# Patient Record
Sex: FEMALE | Race: WHITE | NOT HISPANIC OR LATINO | URBAN - METROPOLITAN AREA
[De-identification: names, ages, dates, MRNs, and addresses within clinical notes are randomized per-mention and may not be internally consistent; named-entity substitution may affect disease eponyms.]

---

## 2019-02-07 ENCOUNTER — EMERGENCY (EMERGENCY)
Facility: HOSPITAL | Age: 49
LOS: 1 days | End: 2019-02-07
Admitting: EMERGENCY MEDICINE
Payer: MEDICARE

## 2019-02-07 PROCEDURE — 69200 CLEAR OUTER EAR CANAL: CPT

## 2019-02-07 PROCEDURE — 99283 EMERGENCY DEPT VISIT LOW MDM: CPT | Mod: 25

## 2019-05-29 ENCOUNTER — OUTPATIENT (OUTPATIENT)
Dept: OUTPATIENT SERVICES | Facility: HOSPITAL | Age: 49
LOS: 1 days | End: 2019-05-29

## 2019-10-15 ENCOUNTER — OUTPATIENT (OUTPATIENT)
Dept: OUTPATIENT SERVICES | Facility: HOSPITAL | Age: 49
LOS: 1 days | Discharge: HOME | End: 2019-10-15
Payer: MEDICARE

## 2019-10-21 PROCEDURE — 90837 PSYTX W PT 60 MINUTES: CPT

## 2019-11-04 PROCEDURE — 99214 OFFICE O/P EST MOD 30 MIN: CPT

## 2019-11-14 DIAGNOSIS — F42.2 MIXED OBSESSIONAL THOUGHTS AND ACTS: ICD-10-CM

## 2019-11-25 PROCEDURE — 99214 OFFICE O/P EST MOD 30 MIN: CPT

## 2019-11-26 ENCOUNTER — OUTPATIENT (OUTPATIENT)
Dept: OUTPATIENT SERVICES | Facility: HOSPITAL | Age: 49
LOS: 1 days | End: 2019-11-26

## 2019-11-27 ENCOUNTER — EMERGENCY (EMERGENCY)
Facility: HOSPITAL | Age: 49
LOS: 1 days | End: 2019-11-27
Admitting: EMERGENCY MEDICINE
Payer: MEDICARE

## 2019-11-27 PROCEDURE — 99284 EMERGENCY DEPT VISIT MOD MDM: CPT

## 2019-11-27 PROCEDURE — 74176 CT ABD & PELVIS W/O CONTRAST: CPT | Mod: 26

## 2019-11-30 ENCOUNTER — EMERGENCY (EMERGENCY)
Facility: HOSPITAL | Age: 49
LOS: 1 days | End: 2019-11-30
Admitting: EMERGENCY MEDICINE
Payer: MEDICARE

## 2019-11-30 PROCEDURE — 99284 EMERGENCY DEPT VISIT MOD MDM: CPT

## 2019-12-10 ENCOUNTER — EMERGENCY (EMERGENCY)
Facility: HOSPITAL | Age: 49
LOS: 1 days | End: 2019-12-10
Admitting: EMERGENCY MEDICINE
Payer: MEDICARE

## 2019-12-10 PROCEDURE — 99284 EMERGENCY DEPT VISIT MOD MDM: CPT

## 2019-12-11 PROCEDURE — 74176 CT ABD & PELVIS W/O CONTRAST: CPT | Mod: 26

## 2019-12-23 PROCEDURE — 99214 OFFICE O/P EST MOD 30 MIN: CPT

## 2020-01-27 PROCEDURE — 99214 OFFICE O/P EST MOD 30 MIN: CPT

## 2020-02-11 ENCOUNTER — APPOINTMENT (OUTPATIENT)
Dept: MAMMOGRAPHY | Facility: CLINIC | Age: 50
End: 2020-02-11
Payer: MEDICARE

## 2020-02-11 PROCEDURE — 77063 BREAST TOMOSYNTHESIS BI: CPT

## 2020-02-11 PROCEDURE — 77067 SCR MAMMO BI INCL CAD: CPT

## 2020-02-13 ENCOUNTER — APPOINTMENT (OUTPATIENT)
Dept: CARDIOLOGY | Facility: CLINIC | Age: 50
End: 2020-02-13
Payer: MEDICARE

## 2020-02-13 VITALS
HEART RATE: 84 BPM | OXYGEN SATURATION: 98 % | SYSTOLIC BLOOD PRESSURE: 144 MMHG | HEIGHT: 64 IN | DIASTOLIC BLOOD PRESSURE: 86 MMHG | WEIGHT: 220 LBS | BODY MASS INDEX: 37.56 KG/M2

## 2020-02-13 DIAGNOSIS — Z82.49 FAMILY HISTORY OF ISCHEMIC HEART DISEASE AND OTHER DISEASES OF THE CIRCULATORY SYSTEM: ICD-10-CM

## 2020-02-13 DIAGNOSIS — F19.11 OTHER PSYCHOACTIVE SUBSTANCE ABUSE, IN REMISSION: ICD-10-CM

## 2020-02-13 DIAGNOSIS — F10.10 ALCOHOL ABUSE, UNCOMPLICATED: ICD-10-CM

## 2020-02-13 DIAGNOSIS — N92.6 IRREGULAR MENSTRUATION, UNSPECIFIED: ICD-10-CM

## 2020-02-13 DIAGNOSIS — Z78.9 OTHER SPECIFIED HEALTH STATUS: ICD-10-CM

## 2020-02-13 PROCEDURE — 99205 OFFICE O/P NEW HI 60 MIN: CPT

## 2020-02-13 RX ORDER — FLUVOXAMINE MALEATE 100 MG/1
100 TABLET, FILM COATED ORAL
Refills: 0 | Status: ACTIVE | COMMUNITY
Start: 2020-02-13

## 2020-02-13 RX ORDER — MULTIVITAMIN
TABLET ORAL DAILY
Refills: 0 | Status: ACTIVE | COMMUNITY
Start: 2020-02-13

## 2020-02-13 NOTE — REVIEW OF SYSTEMS
[Dyspnea on exertion] : dyspnea during exertion [see HPI] : see HPI [Heartburn] : heartburn [Negative] : Heme/Lymph

## 2020-02-13 NOTE — DISCUSSION/SUMMARY
[FreeTextEntry1] : Hypertension: It is not ideally controlled today.  Nonpharmacologic ways of reducing blood pressure were discussed.  In her case, reduction of weight and cutting back on salt would be quite impactful. current medications were continued.  Target blood pressure was discussed\par \par Hyperlipidemia: Patient LDL is well controlled.  It is unclear if she is on statins?  Given her diabetes and other risk factors, the patient should be on statin\par \par Start low-dose aspirin\par \par Evaluate epigastric chest pain and shortness of breath on exertion with echocardiogram and stress testing.  A nuclear stress test would be valuable to rule out CAD with high level of sensitivity.  Risk and benefits as well as limitations were discussed.  Issue of radiation exposure with technetium 99 was discussed.\par \par Intermittent claudications?  Check JANIS.  She does have several risk factors including diabetes.  She does not smoke.\par \par The long-term, dietary modification to reduce weight would help her blood pressure as well as diabetes.  Exercise program would be very helpful and this was also discussed.\par \par Thank you for this referral and allowing me to participate in the care of this patient.  If can be of any further help or  if you have any questions, please do not hesitate to contact me\par \par \par Sincerely,\par \par Gregorio Stephenson MD, FACC, VENICE

## 2020-02-13 NOTE — REASON FOR VISIT
[FreeTextEntry1] : Julianna is a pleasant 50-year-old female with history of hypertension, obesity, diabetes, GERD, OCD.\par \par She has epigastric discomfort at nighttime when she lies down.  Perhaps this is secondary to GERD.  She does not have exertional chest pain.  She does have mild shortness of breath on exertion but no PND or orthopnea.  No pedal edema.\par \par She has sensation of heavy discomfort in the calves on walking.  The walking distance is variable and sometimes she is able to walk without discomfort.\par \par No past history of MI, CHF, TIA, CVA, syncope.\par \par She is compliant with medications, not with lifestyle.  She eats a lot of processed food and has gained weight over the past year.

## 2020-02-13 NOTE — ASSESSMENT
[FreeTextEntry1] : Reviewed today:\par -EKG Feb 2019: Sinus rhythm, poor wave progression.  No significant ST abnormality.\par -Labs November 2019: Normal TSH, normal LFT, normal creatinine, , A1c 6.6, LDL 54.
regular diet with ensure enlive can 3x/day.

## 2020-02-13 NOTE — PHYSICAL EXAM
[General Appearance - Well Developed] : well developed [Normal Appearance] : normal appearance [Well Groomed] : well groomed [No Deformities] : no deformities [General Appearance - Well Nourished] : well nourished [Normal Conjunctiva] : the conjunctiva exhibited no abnormalities [General Appearance - In No Acute Distress] : no acute distress [Eyelids - No Xanthelasma] : the eyelids demonstrated no xanthelasmas [Normal Oral Mucosa] : normal oral mucosa [No Oral Pallor] : no oral pallor [No Oral Cyanosis] : no oral cyanosis [Heart Rate And Rhythm] : heart rate and rhythm were normal [Heart Sounds] : normal S1 and S2 [Murmurs] : no murmurs present [Edema] : no peripheral edema present [Respiration, Rhythm And Depth] : normal respiratory rhythm and effort [Exaggerated Use Of Accessory Muscles For Inspiration] : no accessory muscle use [Auscultation Breath Sounds / Voice Sounds] : lungs were clear to auscultation bilaterally [Abdomen Soft] : soft [FreeTextEntry1] : Obese [Abdomen Tenderness] : non-tender [Cyanosis, Localized] : no localized cyanosis [Nail Clubbing] : no clubbing of the fingernails [Skin Color & Pigmentation] : normal skin color and pigmentation [Skin Turgor] : normal skin turgor [] : no rash [Oriented To Time, Place, And Person] : oriented to person, place, and time [Affect] : the affect was normal [Mood] : the mood was normal [No Anxiety] : not feeling anxious [Memory Recent] : recent memory was not impaired

## 2020-02-24 PROCEDURE — 99214 OFFICE O/P EST MOD 30 MIN: CPT | Mod: 25

## 2020-02-24 PROCEDURE — 90853 GROUP PSYCHOTHERAPY: CPT

## 2020-03-02 PROCEDURE — 90853 GROUP PSYCHOTHERAPY: CPT

## 2020-03-03 ENCOUNTER — APPOINTMENT (OUTPATIENT)
Dept: OBGYN | Facility: CLINIC | Age: 50
End: 2020-03-03
Payer: MEDICARE

## 2020-03-03 VITALS
BODY MASS INDEX: 37.56 KG/M2 | HEIGHT: 64 IN | WEIGHT: 220 LBS | SYSTOLIC BLOOD PRESSURE: 140 MMHG | DIASTOLIC BLOOD PRESSURE: 80 MMHG

## 2020-03-03 DIAGNOSIS — Z86.59 PERSONAL HISTORY OF OTHER MENTAL AND BEHAVIORAL DISORDERS: ICD-10-CM

## 2020-03-03 DIAGNOSIS — N20.0 CALCULUS OF KIDNEY: ICD-10-CM

## 2020-03-03 DIAGNOSIS — Z86.39 PERSONAL HISTORY OF OTHER ENDOCRINE, NUTRITIONAL AND METABOLIC DISEASE: ICD-10-CM

## 2020-03-03 DIAGNOSIS — Z80.0 FAMILY HISTORY OF MALIGNANT NEOPLASM OF DIGESTIVE ORGANS: ICD-10-CM

## 2020-03-03 DIAGNOSIS — Z86.79 PERSONAL HISTORY OF OTHER DISEASES OF THE CIRCULATORY SYSTEM: ICD-10-CM

## 2020-03-03 DIAGNOSIS — Z83.49 FAMILY HISTORY OF OTHER ENDOCRINE, NUTRITIONAL AND METABOLIC DISEASES: ICD-10-CM

## 2020-03-03 DIAGNOSIS — Z01.419 ENCOUNTER FOR GYNECOLOGICAL EXAMINATION (GENERAL) (ROUTINE) W/OUT ABNORMAL FINDINGS: ICD-10-CM

## 2020-03-03 PROCEDURE — G0101: CPT

## 2020-03-05 LAB
APPEARANCE: CLEAR
BILIRUBIN URINE: NEGATIVE
BLOOD URINE: NEGATIVE
COLOR: YELLOW
DATE COLLECTED: NORMAL
GLUCOSE QUALITATIVE U: NEGATIVE
HEMOCCULT SP1 STL QL: NEGATIVE
HPV HIGH+LOW RISK DNA PNL CVX: NOT DETECTED
KETONES URINE: NEGATIVE
LEUKOCYTE ESTERASE URINE: NEGATIVE
NITRITE URINE: NEGATIVE
PH URINE: 6
PROTEIN URINE: NORMAL
QUALITY CONTROL: YES
SPECIFIC GRAVITY URINE: 1.03
UROBILINOGEN URINE: NORMAL

## 2020-03-11 ENCOUNTER — APPOINTMENT (OUTPATIENT)
Dept: CARDIOLOGY | Facility: CLINIC | Age: 50
End: 2020-03-11
Payer: MEDICARE

## 2020-03-11 LAB
CYTOLOGY CVX/VAG DOC THIN PREP: ABNORMAL
URINE CYTOLOGY: NORMAL

## 2020-03-11 PROCEDURE — 93923 UPR/LXTR ART STDY 3+ LVLS: CPT

## 2020-03-19 ENCOUNTER — APPOINTMENT (OUTPATIENT)
Dept: CARDIOLOGY | Facility: CLINIC | Age: 50
End: 2020-03-19
Payer: MEDICARE

## 2020-03-19 VITALS
RESPIRATION RATE: 16 BRPM | HEART RATE: 73 BPM | SYSTOLIC BLOOD PRESSURE: 112 MMHG | OXYGEN SATURATION: 98 % | DIASTOLIC BLOOD PRESSURE: 82 MMHG

## 2020-03-19 PROCEDURE — 93306 TTE W/DOPPLER COMPLETE: CPT

## 2020-03-19 PROCEDURE — 78452 HT MUSCLE IMAGE SPECT MULT: CPT

## 2020-03-19 PROCEDURE — 99214 OFFICE O/P EST MOD 30 MIN: CPT | Mod: 25

## 2020-03-19 PROCEDURE — A9502: CPT

## 2020-03-19 PROCEDURE — 93015 CV STRESS TEST SUPVJ I&R: CPT

## 2020-03-19 NOTE — HISTORY OF PRESENT ILLNESS
[FreeTextEntry1] : This is a 50 year female with the below PMH hypertension, obesity, diabetes, GERD, OCD. who presents to office for followup and review of most recent cardiovascular testing.\par There has been no recent illness or hospitalization.\par \par She denies chest pain, pressure, palpitations, unusual shortness of breath, orthopnea, LE edema, lightheadedness, dizziness, near syncope or syncope. \par \par Patient did have episode of 1/10 chest discomfort and shortness of breath prior to starting nuclear stress testing. EKG at that time was reviewed by myself with no evidence of ischemia, Informed patient of normal vital signs and no EKG changes; and patient became chest pain free.\par \par Nuclear stress test\par March 19, 2020: Dejuan protocol, 9 minutes, patient asymptomatic for chest pain or leg claudication, EKG suggestive for ischemia with 2 mm horizontal downsloping ST segment depressions inferolaterally, with frequent PVCs during stress, and moderate shortness of breath at peak that quickly resolved in recovery. SPECT mild defects in the anterior and apical wall that are slightly reversible, suggestion of infarction with minimal cristobal-infarct. \par \par Echo:\par March 19, 2020;EF 50-55%, no aortic dilatation, mild MR, mild systolic dysfunction basal and mid lateral wall and mid inferolateral hypokinesis, false tendon noted in left ventricle normal diastolic function\par \par JANIS:\par March 11, 2020,  suggest non-compressible study to left calf with 1.30 reading (reference 1.29 normal), right leg within normal limits. (Patient without symptoms of leg pain or claudication during exercise nuclear stress test on 3/19/2020)\par \par Historical medical history:\par She has epigastric discomfort at nighttime when she lies down.  Perhaps this is secondary to GERD.  She does not have exertional chest pain.  She does have mild shortness of breath on exertion but no PND or orthopnea.  No pedal edema.\par She has sensation of heavy discomfort in the calves on walking.  The walking distance is variable and sometimes she is able to walk without discomfort.\par \par No past history of MI, CHF, TIA, CVA, syncope.\par \par She is compliant with medications, not with lifestyle.  She eats a lot of processed food and has gained weight over the past year.

## 2020-03-19 NOTE — REVIEW OF SYSTEMS
[Dyspnea on exertion] : dyspnea during exertion [see HPI] : see HPI [Negative] : Heme/Lymph [Heartburn] : no heartburn

## 2020-03-19 NOTE — PHYSICAL EXAM
[General Appearance - Well Developed] : well developed [Normal Appearance] : normal appearance [Well Groomed] : well groomed [General Appearance - Well Nourished] : well nourished [No Deformities] : no deformities [General Appearance - In No Acute Distress] : no acute distress [Normal Conjunctiva] : the conjunctiva exhibited no abnormalities [Eyelids - No Xanthelasma] : the eyelids demonstrated no xanthelasmas [Normal Oral Mucosa] : normal oral mucosa [No Oral Pallor] : no oral pallor [No Oral Cyanosis] : no oral cyanosis [Respiration, Rhythm And Depth] : normal respiratory rhythm and effort [Exaggerated Use Of Accessory Muscles For Inspiration] : no accessory muscle use [Heart Rate And Rhythm] : heart rate and rhythm were normal [Edema] : no peripheral edema present [Nail Clubbing] : no clubbing of the fingernails [Cyanosis, Localized] : no localized cyanosis [Skin Color & Pigmentation] : normal skin color and pigmentation [Skin Turgor] : normal skin turgor [] : no rash [Oriented To Time, Place, And Person] : oriented to person, place, and time [Gait - Sufficient For Exercise Testing] : the gait was sufficient for exercise testing [FreeTextEntry1] : Obese

## 2020-03-19 NOTE — DISCUSSION/SUMMARY
[FreeTextEntry1] : This is a 50 year F with the above PMH and below problems were addressed during today's cardiovascular care visit. Patient verbalizes they understand the plan and any questions and concerns were addressed.\par \par Abnormal stress testing: Recommended Coronary CTA,  Start EC aspirin 81 mg daily, Crestor 20 mg daily, Toprol ER 25 mg Daily.\par \par Atypical chest pain/ intermittent BARTON: Accompanied by abnormal ischemia evaluation, will review coronary CTA once resulted. EF 50-55% milld VHD, pt with H/o GERD, she stated will also consult with gastroenterologist.\par \par Hypertension: Well controlled, continue with Losartan 100 mg and HCTZ 25 mg daily\par \par Intermittent claudications: Reviewed  JANIS, borderline L calf PAD,  She does have several risk factors including diabetes. No leg / calf pain during exercise stress testing. continue with surveillance monitoring\par \par Patient educated and advised to follow active lifestyle with regular cardiovascular exercise, as well as diet modification with low sodium, low fat, and avoidance of excessive alcohol.\par \par Follow up after testing, unless otherwise indicated,\par Discussed red flag symptoms, which would warrant sooner or emergent medical evaluation.\par \par Sincerely,\par \par ANA Torres \par Patients history, testing, and plan reviewed with supervising MD: Dr. Gregorio Stephenson\par \par \par \par

## 2020-03-20 LAB
BACTERIA UR CULT: NORMAL
BACTERIA UR CULT: NORMAL
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
## 2020-03-23 PROCEDURE — 99214 OFFICE O/P EST MOD 30 MIN: CPT

## 2020-03-26 ENCOUNTER — APPOINTMENT (OUTPATIENT)
Dept: CARDIOLOGY | Facility: CLINIC | Age: 50
End: 2020-03-26

## 2020-04-09 RX ORDER — METOPROLOL SUCCINATE 25 MG/1
25 TABLET, EXTENDED RELEASE ORAL DAILY
Qty: 90 | Refills: 3 | Status: DISCONTINUED | COMMUNITY
Start: 2020-03-19 | End: 2020-04-09

## 2020-04-09 RX ORDER — ROSUVASTATIN CALCIUM 20 MG/1
20 TABLET, FILM COATED ORAL
Qty: 90 | Refills: 3 | Status: DISCONTINUED | COMMUNITY
Start: 2020-03-19 | End: 2020-04-09

## 2020-04-13 ENCOUNTER — APPOINTMENT (OUTPATIENT)
Dept: UROGYNECOLOGY | Facility: CLINIC | Age: 50
End: 2020-04-13

## 2020-04-13 PROCEDURE — 90853 GROUP PSYCHOTHERAPY: CPT

## 2020-04-14 ENCOUNTER — APPOINTMENT (OUTPATIENT)
Dept: UROGYNECOLOGY | Facility: CLINIC | Age: 50
End: 2020-04-14

## 2020-04-14 NOTE — DISCUSSION/SUMMARY
Pt in room alert with confusion. No s/s of acute distress. Able to follow simple commands. 
Pt needs constant reminders and reorientation. No new orders at this time. 3 side rails 
raised and bed locked. Denies any pain or discomfort at his time. Continue to monitor. [FreeTextEntry1] : \par  We reviewed management options for stress urinary incontinence including: observation, pelvic floor exercises, continence devices, periurethral bulking agents, imipramine, and surgical management. Written information on stress urinary incontinence including management options from [facility] was emailed to her for review. I recommend further evaluation with urodynamic testing. She will RTO for URD and follow up with me to review results and discuss management options further.

## 2020-04-14 NOTE — HISTORY OF PRESENT ILLNESS
[Pain During Urination (Dysuria)] : none [de-identified] : uses pads as needed [FreeTextEntry1] : \par PMH: HTN, DM2, OCD, depression, asthma, HLD\par PSH: cholecystectomy\par Social History: nonsmoker, employed?\par Allergies: PCN

## 2020-04-27 PROCEDURE — 90853 GROUP PSYCHOTHERAPY: CPT

## 2020-05-04 ENCOUNTER — APPOINTMENT (OUTPATIENT)
Dept: PULMONOLOGY | Facility: CLINIC | Age: 50
End: 2020-05-04

## 2020-05-04 PROCEDURE — 90853 GROUP PSYCHOTHERAPY: CPT

## 2020-05-06 ENCOUNTER — APPOINTMENT (OUTPATIENT)
Dept: PULMONOLOGY | Facility: CLINIC | Age: 50
End: 2020-05-06
Payer: MEDICARE

## 2020-05-06 ENCOUNTER — RX CHANGE (OUTPATIENT)
Age: 50
End: 2020-05-06

## 2020-05-06 VITALS
BODY MASS INDEX: 39.87 KG/M2 | SYSTOLIC BLOOD PRESSURE: 138 MMHG | OXYGEN SATURATION: 98 % | WEIGHT: 225 LBS | HEART RATE: 89 BPM | HEIGHT: 63 IN | DIASTOLIC BLOOD PRESSURE: 86 MMHG | TEMPERATURE: 97.9 F

## 2020-05-06 DIAGNOSIS — Z82.61 FAMILY HISTORY OF ARTHRITIS: ICD-10-CM

## 2020-05-06 DIAGNOSIS — Z82.5 FAMILY HISTORY OF ASTHMA AND OTHER CHRONIC LOWER RESPIRATORY DISEASES: ICD-10-CM

## 2020-05-06 DIAGNOSIS — Z87.01 PERSONAL HISTORY OF PNEUMONIA (RECURRENT): ICD-10-CM

## 2020-05-06 LAB
BASOPHILS # BLD AUTO: 0.08 K/UL
BASOPHILS NFR BLD AUTO: 1.2 %
EOSINOPHIL # BLD AUTO: 0.17 K/UL
EOSINOPHIL NFR BLD AUTO: 2.6 %
HCT VFR BLD CALC: 39.9 %
HGB BLD-MCNC: 13.1 G/DL
IMM GRANULOCYTES NFR BLD AUTO: 0.3 %
LYMPHOCYTES # BLD AUTO: 2.23 K/UL
LYMPHOCYTES NFR BLD AUTO: 34 %
MAN DIFF?: NORMAL
MCHC RBC-ENTMCNC: 29.3 PG
MCHC RBC-ENTMCNC: 32.8 GM/DL
MCV RBC AUTO: 89.3 FL
MONOCYTES # BLD AUTO: 0.47 K/UL
MONOCYTES NFR BLD AUTO: 7.2 %
NEUTROPHILS # BLD AUTO: 3.58 K/UL
NEUTROPHILS NFR BLD AUTO: 54.7 %
PLATELET # BLD AUTO: 212 K/UL
RBC # BLD: 4.47 M/UL
RBC # FLD: 13.1 %
WBC # FLD AUTO: 6.55 K/UL

## 2020-05-06 PROCEDURE — 99204 OFFICE O/P NEW MOD 45 MIN: CPT

## 2020-05-06 RX ORDER — FLUTICASONE PROPIONATE AND SALMETEROL 232; 14 UG/1; UG/1
232-14 POWDER, METERED RESPIRATORY (INHALATION) TWICE DAILY
Qty: 1 | Refills: 5 | Status: DISCONTINUED | COMMUNITY
Start: 2020-05-06 | End: 2020-05-06

## 2020-05-06 RX ORDER — PANTOPRAZOLE SODIUM 20 MG/1
TABLET, DELAYED RELEASE ORAL
Refills: 0 | Status: DISCONTINUED | COMMUNITY
End: 2020-05-06

## 2020-05-06 NOTE — HISTORY OF PRESENT ILLNESS
[Obstructive Sleep Apnea] : obstructive sleep apnea [Awakes Unrefreshed] : awakes unrefreshed [Awakes with Dry Mouth] : awakes with dry mouth [Awakes with Headache] : awakes with headache [Daytime Somnolence] : daytime somnolence [Snoring] : snoring [TextBox_4] : 50-year-old female with no significant pulmonary problems diagnosed with a community acquired pneumonia. 2 years ago, seen today for complaints of cough. Patient states that the cough has occurred over the course of the last several months usually greatest in the morning, but severe enough to precipitate dry heaves. She has noted mild wheezing at times with shortness of breath. She notes a decreased ability to climb stairs and perform heavy lifting. She denies any sinus headaches, heartburn, palpitations, lightheadedness, dizziness. She has gained approximately 10 pounds during the course of social distance sitting and isolation from Covid 19. Has no history of any fevers, chills, chest pains [TextBox_77] : 12am [TextBox_79] : 8am [TextBox_81] : 30 [TextBox_89] : 2 [TextBox_3] : 10

## 2020-05-06 NOTE — DISCUSSION/SUMMARY
[FreeTextEntry1] : 50-year-old female, seen today for the above. Her complaints of dyspnea on strong suggestive of reactive airways disease. Unfortunately, due to the Covid 19 cautions spirometry is unable to be performed. I am therefore instituting a long-acting beta agonist/inhaled corticosteroid as a therapeutic trial.\par \par Patient's history is also strongly suggestive of obstructive sleep apnea. A home sleep study will be performed and the patient will be followed up for both issues on her next visit.

## 2020-05-06 NOTE — ASSESSMENT
[FreeTextEntry1] : 50-year-old female, seen today for the above. Her complaints of dyspnea on strong suggestive of reactive airways disease. Unfortunately, due to the Covid 19 cautions spirometry is unable to be performed. I am therefore instituting a long-acting beta agonist/inhaled corticosteroid as a therapeutic trial.  Patient's history is also strongly suggestive of obstructive sleep apnea. A home sleep study will be performed and the patient will be followed up for both issues on her next visit.

## 2020-05-08 LAB
ALBUMIN SERPL ELPH-MCNC: 4.4 G/DL
ALP BLD-CCNC: 62 U/L
ALT SERPL-CCNC: 42 U/L
ANION GAP SERPL CALC-SCNC: 16 MMOL/L
AST SERPL-CCNC: 30 U/L
BILIRUB SERPL-MCNC: 0.2 MG/DL
BUN SERPL-MCNC: 15 MG/DL
CALCIUM SERPL-MCNC: 9.8 MG/DL
CHLORIDE SERPL-SCNC: 101 MMOL/L
CO2 SERPL-SCNC: 27 MMOL/L
CREAT SERPL-MCNC: 1.02 MG/DL
GLUCOSE SERPL-MCNC: 129 MG/DL
POTASSIUM SERPL-SCNC: 4.5 MMOL/L
PROT SERPL-MCNC: 7 G/DL
SODIUM SERPL-SCNC: 144 MMOL/L

## 2020-05-11 PROCEDURE — 90853 GROUP PSYCHOTHERAPY: CPT

## 2020-05-18 PROCEDURE — 90853 GROUP PSYCHOTHERAPY: CPT

## 2020-05-22 ENCOUNTER — OUTPATIENT (OUTPATIENT)
Dept: OUTPATIENT SERVICES | Facility: HOSPITAL | Age: 50
LOS: 1 days | End: 2020-05-22
Payer: MEDICARE

## 2020-05-22 DIAGNOSIS — G47.33 OBSTRUCTIVE SLEEP APNEA (ADULT) (PEDIATRIC): ICD-10-CM

## 2020-05-22 PROCEDURE — 95806 SLEEP STUDY UNATT&RESP EFFT: CPT

## 2020-05-22 PROCEDURE — 95806 SLEEP STUDY UNATT&RESP EFFT: CPT | Mod: 26

## 2020-05-22 PROCEDURE — G0399: CPT

## 2020-05-27 ENCOUNTER — OUTPATIENT (OUTPATIENT)
Dept: OUTPATIENT SERVICES | Facility: HOSPITAL | Age: 50
LOS: 1 days | End: 2020-05-27

## 2020-06-08 ENCOUNTER — APPOINTMENT (OUTPATIENT)
Dept: PULMONOLOGY | Facility: CLINIC | Age: 50
End: 2020-06-08
Payer: MEDICARE

## 2020-06-08 VITALS
WEIGHT: 223 LBS | DIASTOLIC BLOOD PRESSURE: 86 MMHG | HEART RATE: 86 BPM | OXYGEN SATURATION: 96 % | SYSTOLIC BLOOD PRESSURE: 130 MMHG | BODY MASS INDEX: 39.5 KG/M2

## 2020-06-08 PROCEDURE — 99215 OFFICE O/P EST HI 40 MIN: CPT

## 2020-06-08 NOTE — REASON FOR VISIT
[Follow-Up] : a follow-up visit [Sleep Apnea] : sleep apnea [Shortness of Breath] : shortness of breath

## 2020-06-08 NOTE — PHYSICAL EXAM
[No Acute Distress] : no acute distress [IV] : Mallampati Class: IV [Normal Oropharynx] : normal oropharynx [Normal Appearance] : normal appearance [Neck Circumference: ___] : neck circumference: [unfilled] [No Neck Mass] : no neck mass [Normal Rate/Rhythm] : normal rate/rhythm [Normal S1, S2] : normal s1, s2 [No Murmurs] : no murmurs [Clear to Auscultation Bilaterally] : clear to auscultation bilaterally [No Resp Distress] : no resp distress [No Abnormalities] : no abnormalities [Benign] : benign [Normal Gait] : normal gait [No Clubbing] : no clubbing [No Cyanosis] : no cyanosis [No Edema] : no edema [FROM] : FROM [Normal Color/ Pigmentation] : normal color/ pigmentation [No Focal Deficits] : no focal deficits [Oriented x3] : oriented x3 [Normal Affect] : normal affect

## 2020-06-08 NOTE — DISCUSSION/SUMMARY
[FreeTextEntry1] : 50-year-old female, seen today for the above. Patient's complaints of dyspnea are still somewhat out of proportion to her physical findings. The likelihood that this is bronchospastic disease is less, but she is no history to suggest heart failure, interstitial lung disease, et cetera. Most likely deconditioning.\par \par Home sleep study, consistent with severe obstructive sleep apnea.\par CPAP initiated with Resmed Airsense 10 autoset (for her) in a range 4-16 with F30 mask\par The pathophysiology of sleep was explained to the patient in detail. Inclusive of this was the reasoning behind and the expected response to positive airway pressure therapy. Compliance was outlined including further followup\par \par

## 2020-06-08 NOTE — HISTORY OF PRESENT ILLNESS
[TextBox_4] : 50-year-old female with no significant pulmonary issues. Status post community acquired pneumonia 2 years ago, seen for followup of complaints of chronic cough. Patient was Covid 19 negative. When last seen she was felt to have some degree of brought reactivity and placed on Breo, but feels that there has been no significant improvement. She continues to note mild shortness of breath without paroxysmal nocturnal dyspnea, orthopnea, or leg edema. [Obstructive Sleep Apnea] : obstructive sleep apnea [Awakes with Dry Mouth] : awakes with dry mouth [Awakes Unrefreshed] : awakes unrefreshed [Awakes with Headache] : awakes with headache [Daytime Somnolence] : daytime somnolence [Snoring] : snoring [Home] : home [TextBox_77] : 12am [TextBox_79] : 8am [TextBox_100] : 5/22/20 [TextBox_89] : 2 [TextBox_81] : 30 [TextBox_3] : 10 [TextBox_108] : 54.8

## 2020-06-11 ENCOUNTER — APPOINTMENT (OUTPATIENT)
Dept: CARDIOLOGY | Facility: CLINIC | Age: 50
End: 2020-06-11

## 2020-06-11 PROCEDURE — 99442: CPT | Mod: 95

## 2020-06-17 ENCOUNTER — APPOINTMENT (OUTPATIENT)
Dept: CARDIOLOGY | Facility: CLINIC | Age: 50
End: 2020-06-17
Payer: MEDICARE

## 2020-06-17 VITALS
BODY MASS INDEX: 40.75 KG/M2 | TEMPERATURE: 98.7 F | WEIGHT: 230 LBS | SYSTOLIC BLOOD PRESSURE: 136 MMHG | DIASTOLIC BLOOD PRESSURE: 80 MMHG | HEART RATE: 78 BPM | OXYGEN SATURATION: 98 % | HEIGHT: 63 IN

## 2020-06-17 DIAGNOSIS — Z78.9 OTHER SPECIFIED HEALTH STATUS: ICD-10-CM

## 2020-06-17 DIAGNOSIS — Z87.898 PERSONAL HISTORY OF OTHER SPECIFIED CONDITIONS: ICD-10-CM

## 2020-06-17 PROCEDURE — 99214 OFFICE O/P EST MOD 30 MIN: CPT

## 2020-06-17 RX ORDER — ARIPIPRAZOLE 10 MG/1
10 TABLET ORAL DAILY
Refills: 0 | Status: DISCONTINUED | COMMUNITY
Start: 2020-02-13 | End: 2020-06-17

## 2020-06-17 NOTE — HISTORY OF PRESENT ILLNESS
[FreeTextEntry1] : This is a 50 year female with the below PMH hypertension, obesity, diabetes, GERD, OCD. \par There has been no recent illness or hospitalization.\par \par She denies chest pain, pressure, palpitations, unusual shortness of breath, orthopnea, LE edema, lightheadedness, dizziness, near syncope or syncope. \par \par Patient did have episode of 1/10 chest discomfort and shortness of breath prior to starting nuclear stress testing.  She does not have exertional angina\par \par Nuclear stress test\par March 19, 2020: Dejuan protocol, 9 minutes, patient asymptomatic for chest pain or leg claudication, EKG suggestive for ischemia with 2 mm horizontal downsloping ST segment depressions inferolaterally, with frequent PVCs during stress, and moderate shortness of breath at peak.  Abnormal SPECT.\par \par Subsequent  CT coronary calcium score was 0 in April 2020\par \par Echo:\par March 19, 2020;EF 50-55%, no aortic dilatation, mild MR, mild systolic dysfunction basal and mid lateral wall and mid inferolateral hypokinesis, false tendon noted in left ventricle normal diastolic function\par \par JANIS:\par March 11, 2020,  suggest non-compressible study to left calf with 1.30 reading (reference 1.29 normal), right leg within normal limits. (Patient without symptoms of leg pain or claudication during exercise nuclear stress test on 3/19/2020)\par \par Historical medical history:\par She has epigastric discomfort at nighttime when she lies down.  Perhaps this is secondary to GERD.  She does have mild shortness of breath on exertion but no PND or orthopnea.  No pedal edema.\par \par \par No past history of MI, CHF, TIA, CVA, syncope.\par \par She is compliant with medications, not with lifestyle.  She eats a lot of processed food and has gained weight over the past year.

## 2020-06-17 NOTE — REVIEW OF SYSTEMS
[Dyspnea on exertion] : dyspnea during exertion [see HPI] : see HPI [Negative] : Psychiatric [Heartburn] : no heartburn

## 2020-06-17 NOTE — PHYSICAL EXAM
[General Appearance - Well Developed] : well developed [Well Groomed] : well groomed [Normal Appearance] : normal appearance [General Appearance - Well Nourished] : well nourished [No Deformities] : no deformities [General Appearance - In No Acute Distress] : no acute distress [Normal Conjunctiva] : the conjunctiva exhibited no abnormalities [Eyelids - No Xanthelasma] : the eyelids demonstrated no xanthelasmas [Normal Oral Mucosa] : normal oral mucosa [No Oral Pallor] : no oral pallor [No Oral Cyanosis] : no oral cyanosis [Respiration, Rhythm And Depth] : normal respiratory rhythm and effort [Exaggerated Use Of Accessory Muscles For Inspiration] : no accessory muscle use [Nail Clubbing] : no clubbing of the fingernails [Gait - Sufficient For Exercise Testing] : the gait was sufficient for exercise testing [Cyanosis, Localized] : no localized cyanosis [Skin Color & Pigmentation] : normal skin color and pigmentation [Skin Turgor] : normal skin turgor [] : no rash [Oriented To Time, Place, And Person] : oriented to person, place, and time [FreeTextEntry1] : no JVD

## 2020-06-17 NOTE — DISCUSSION/SUMMARY
[FreeTextEntry1] : This is a 50 year F with the above PMH and below problems \par \par No further chest pains or angina.  Mild dyspnea on exertion is unchanged and most likely from physical conditioning and overweight.  She also has LORA\par \par Normal LV function. JANIS was unremarkable\par \par History  of GERD.\par \par Hypertension: Well controlled, continue with Losartan 100 mg and HCTZ 25 mg daily\par \par Patient educated and advised to follow active lifestyle with regular cardiovascular exercise, as well as diet modification\par \par Follow-up in 6 months\par \par Thank you for this referral and allowing me to participate in the care of this patient.  If can be of any further help or  if you have any questions, please do not hesitate to contact me\par \par \par Sincerely,\par \par Gregorio Stephenson MD, FACC, VENICE

## 2020-06-22 PROCEDURE — 99442: CPT | Mod: 95

## 2020-06-22 PROCEDURE — 90853 GROUP PSYCHOTHERAPY: CPT | Mod: 95

## 2020-07-13 PROCEDURE — 90853 GROUP PSYCHOTHERAPY: CPT

## 2020-07-16 PROCEDURE — 99442: CPT | Mod: 95

## 2020-07-18 ENCOUNTER — APPOINTMENT (OUTPATIENT)
Dept: DISASTER EMERGENCY | Facility: CLINIC | Age: 50
End: 2020-07-18

## 2020-07-19 LAB — SARS-COV-2 N GENE NPH QL NAA+PROBE: NOT DETECTED

## 2020-07-21 ENCOUNTER — APPOINTMENT (OUTPATIENT)
Dept: PULMONOLOGY | Facility: CLINIC | Age: 50
End: 2020-07-21
Payer: MEDICARE

## 2020-07-21 VITALS
BODY MASS INDEX: 40.93 KG/M2 | SYSTOLIC BLOOD PRESSURE: 128 MMHG | HEIGHT: 63 IN | WEIGHT: 231 LBS | HEART RATE: 88 BPM | DIASTOLIC BLOOD PRESSURE: 84 MMHG | OXYGEN SATURATION: 97 %

## 2020-07-21 PROCEDURE — 85018 HEMOGLOBIN: CPT | Mod: QW

## 2020-07-21 PROCEDURE — 94729 DIFFUSING CAPACITY: CPT

## 2020-07-21 PROCEDURE — 99214 OFFICE O/P EST MOD 30 MIN: CPT | Mod: 25

## 2020-07-21 PROCEDURE — 94010 BREATHING CAPACITY TEST: CPT

## 2020-07-21 PROCEDURE — 94727 GAS DIL/WSHOT DETER LNG VOL: CPT

## 2020-07-21 RX ORDER — FLUTICASONE FUROATE AND VILANTEROL TRIFENATATE 200; 25 UG/1; UG/1
200-25 POWDER RESPIRATORY (INHALATION) DAILY
Qty: 1 | Refills: 5 | Status: DISCONTINUED | COMMUNITY
Start: 2020-05-06 | End: 2020-07-21

## 2020-07-21 NOTE — HISTORY OF PRESENT ILLNESS
[Obstructive Sleep Apnea] : obstructive sleep apnea [Awakes with Headache] : awakes with headache [Snoring] : snoring [Home] : home [APAP:] : APAP [Full Face mask] : full face mask [TextBox_4] : Patient continues to note shortness of breath with activity. No history of cough, wheeze, or sputum [TextBox_77] : 12am [TextBox_81] : 30 [TextBox_79] : 8am [TextBox_89] : 2 [TextBox_100] : 5/22/20 [TextBox_127] : 6/15/20 [TextBox_125] : 4-16 [TextBox_108] : 54.8 [TextBox_147] : 2.0 [TextBox_129] : 7/14/20 [TextBox_137] : 10 [TextBox_133] : 17 [TextBox_158] : Yemi [TextBox_160] : F3 [TextBox_162] : 6/8/20 [TextBox_164] : 6/8/25

## 2020-07-21 NOTE — PHYSICAL EXAM
[No Acute Distress] : no acute distress [Normal Oropharynx] : normal oropharynx [IV] : Mallampati Class: IV [Normal Appearance] : normal appearance [Neck Circumference: ___] : neck circumference: [unfilled] [No Neck Mass] : no neck mass [Normal Rate/Rhythm] : normal rate/rhythm [Normal S1, S2] : normal s1, s2 [No Murmurs] : no murmurs [No Resp Distress] : no resp distress [Clear to Auscultation Bilaterally] : clear to auscultation bilaterally [No Abnormalities] : no abnormalities [No Clubbing] : no clubbing [Normal Gait] : normal gait [Benign] : benign [No Cyanosis] : no cyanosis [No Edema] : no edema [Normal Color/ Pigmentation] : normal color/ pigmentation [FROM] : FROM [No Focal Deficits] : no focal deficits [Oriented x3] : oriented x3 [Normal Affect] : normal affect

## 2020-07-21 NOTE — DISCUSSION/SUMMARY
[Obstructive Sleep Apnea] : obstructive sleep apnea [Responding to Treatment] : responding to treatment [Alcohol Avoidance] : alcohol avoidance [Severe] : severe [Weight Loss Program] : weight loss program [Sedative Avoidance] : sedative avoidance [de-identified] : Patient advised to improve compliance [de-identified] : Advised to improve compliance [FreeTextEntry1] : Complaints of dyspnea, most likely on the basis of deconditioning. No pulmonary etiology noted

## 2020-08-04 ENCOUNTER — TRANSCRIPTION ENCOUNTER (OUTPATIENT)
Age: 50
End: 2020-08-04

## 2020-08-10 PROCEDURE — 90853 GROUP PSYCHOTHERAPY: CPT

## 2020-08-17 PROCEDURE — 90853 GROUP PSYCHOTHERAPY: CPT

## 2020-09-14 PROCEDURE — 90853 GROUP PSYCHOTHERAPY: CPT

## 2020-09-17 PROCEDURE — 99442: CPT | Mod: 95

## 2020-09-22 ENCOUNTER — EMERGENCY (EMERGENCY)
Facility: HOSPITAL | Age: 50
LOS: 1 days | End: 2020-09-22
Admitting: EMERGENCY MEDICINE
Payer: MEDICARE

## 2020-09-22 PROCEDURE — 99285 EMERGENCY DEPT VISIT HI MDM: CPT

## 2020-09-22 PROCEDURE — 74176 CT ABD & PELVIS W/O CONTRAST: CPT | Mod: 26

## 2020-09-24 ENCOUNTER — APPOINTMENT (OUTPATIENT)
Dept: GASTROENTEROLOGY | Facility: CLINIC | Age: 50
End: 2020-09-24
Payer: MEDICARE

## 2020-09-24 VITALS
WEIGHT: 232 LBS | SYSTOLIC BLOOD PRESSURE: 148 MMHG | BODY MASS INDEX: 41.11 KG/M2 | HEIGHT: 63 IN | HEART RATE: 84 BPM | DIASTOLIC BLOOD PRESSURE: 99 MMHG | TEMPERATURE: 98.6 F

## 2020-09-24 DIAGNOSIS — R68.81 EARLY SATIETY: ICD-10-CM

## 2020-09-24 DIAGNOSIS — Z80.6 FAMILY HISTORY OF LEUKEMIA: ICD-10-CM

## 2020-09-24 DIAGNOSIS — Z78.9 OTHER SPECIFIED HEALTH STATUS: ICD-10-CM

## 2020-09-24 DIAGNOSIS — Z80.7 FAMILY HISTORY OF OTHER MALIGNANT NEOPLASMS OF LYMPHOID, HEMATOPOIETIC AND RELATED TISSUES: ICD-10-CM

## 2020-09-24 PROCEDURE — 99204 OFFICE O/P NEW MOD 45 MIN: CPT

## 2020-09-24 NOTE — ADDENDUM
[FreeTextEntry1] : I, Danyelle Casillas NP, acted as scribe for Dr. Chauncey Mcnulty for this patient encounter

## 2020-09-24 NOTE — REASON FOR VISIT
[Initial Evaluation] : an initial evaluation [Friend] : friend [FreeTextEntry1] : nausea, vomiting, early satiety, GERD, diarrhea

## 2020-09-24 NOTE — PHYSICAL EXAM
[General Appearance - Alert] : alert [General Appearance - In No Acute Distress] : in no acute distress [Sclera] : the sclera and conjunctiva were normal [PERRL With Normal Accommodation] : pupils were equal in size, round, and reactive to light [Extraocular Movements] : extraocular movements were intact [Outer Ear] : the ears and nose were normal in appearance [Oropharynx] : the oropharynx was normal [Neck Appearance] : the appearance of the neck was normal [Neck Cervical Mass (___cm)] : no neck mass was observed [Jugular Venous Distention Increased] : there was no jugular-venous distention [Thyroid Diffuse Enlargement] : the thyroid was not enlarged [Thyroid Nodule] : there were no palpable thyroid nodules [Auscultation Breath Sounds / Voice Sounds] : lungs were clear to auscultation bilaterally [Heart Rate And Rhythm] : heart rate was normal and rhythm regular [Heart Sounds] : normal S1 and S2 [Heart Sounds Gallop] : no gallops [Murmurs] : no murmurs [Heart Sounds Pericardial Friction Rub] : no pericardial rub [Bowel Sounds] : normal bowel sounds [Abdomen Soft] : soft [Abdomen Tenderness] : non-tender [Abdomen Mass (___ Cm)] : no abdominal mass palpated [Abnormal Walk] : normal gait [Nail Clubbing] : no clubbing  or cyanosis of the fingernails [Musculoskeletal - Swelling] : no joint swelling seen [Motor Tone] : muscle strength and tone were normal [Skin Color & Pigmentation] : normal skin color and pigmentation [Skin Turgor] : normal skin turgor [] : no rash [Deep Tendon Reflexes (DTR)] : deep tendon reflexes were 2+ and symmetric [Sensation] : the sensory exam was normal to light touch and pinprick [No Focal Deficits] : no focal deficits [Oriented To Time, Place, And Person] : oriented to person, place, and time [Impaired Insight] : insight and judgment were intact [Affect] : the affect was normal [FreeTextEntry1] : obese

## 2020-09-24 NOTE — END OF VISIT
[FreeTextEntry3] : I was present for the evaluation discuss the findings of the history and physical with Danyelle Casillas NPthe patient understands the evaluation including workup for gastric varices and further evaluation pending the review of her prior endoscopic procedures.

## 2020-09-24 NOTE — HISTORY OF PRESENT ILLNESS
[Heartburn] : heartburn worsened [Nausea] : nausea worsened [Vomiting] : vomiting worsened [Diarrhea] : stable diarrhea [Constipation] : denies constipation [Yellow Skin Or Eyes (Jaundice)] : denies jaundice [Abdominal Pain] : denies abdominal pain [Abdominal Swelling] : denies abdominal swelling [Rectal Pain] : denies rectal pain [Cholecystectomy] : cholecystectomy [de-identified] : NATIVIDAD MORA is a 50 year old female presenting today with complaints of nausea, vomiting, early satiety, GERD, and diarrhea. She reports that she has been having daily reflux, nausea, and vomiting since November of 2019. The acid reflux occurs daily and is worst at night and the early morning. She also reports daily nausea and vomiting, mostly in the mornings although it can occur at any time of day. She states that the emesis can be either bile or undigested food. She was seen by her former gastroenterologist, Dr. Keita, and had an EGD done in April which showed erosive gastritis, however the full report is not currently available for review. She was given a 3 month supply of Pantoprazole 40 mg which she states did reduce her symptoms, however she ran out of the medication and was not happy with Dr. Keita and decided to seek care elsewhere. She reports that she has also been experiencing early satiety since these symptoms began. She denies any unintentional weight loss. \par She reports at least 3 episodes of loose stools daily. denies any fecal urgency or incontinence. There is no abdominal pain, black or bloody stools. She had a colonoscopy in 2018 which she alleges was normal but the records are not available for review. Her mom had colon polyps but there is no family history of colon cancer. She doesn't chew sugar free gum. She is a type 2 diabetic currently on Metformin which may be the cause of the loose stools and possible gastroparesis. Her BMI is 41.1.

## 2020-09-24 NOTE — ASSESSMENT
[FreeTextEntry1] : An EGD in April showed erosive gastritis and she was prescribed Pantoprazole 40 mg which she is no longer taking because her prescription ran out. She reports worsening GERD symptoms along with nausea and vomiting. A renewal of Pantoprazole 40 mg will be sent to her pharmacy. She has been experiencing early satiety which may be a sign of gastroparesis and could be contributing to her upper GI symptoms. She will undergo a gastric emptying study for further evaluation. \par \par Her daily loose bowel movements are likely due to the fact that she is a diabetic and taking Metformin, as both are known to cause loose stools. She will speak with her PCP or endocrinologist about possibly switching the Metformin to another medication that is less likely to cause diarrhea. \par \par We will obtain her previous colonoscopy, endoscopy, and any pathology from Dr. Keita's office for review. It is unlikely that she will need a repeat of either procedure at this time but that will be decided upon once the records are obtained.

## 2020-09-28 PROCEDURE — 90853 GROUP PSYCHOTHERAPY: CPT

## 2020-10-12 PROCEDURE — 90853 GROUP PSYCHOTHERAPY: CPT

## 2020-10-22 ENCOUNTER — APPOINTMENT (OUTPATIENT)
Dept: PULMONOLOGY | Facility: CLINIC | Age: 50
End: 2020-10-22
Payer: MEDICARE

## 2020-10-22 ENCOUNTER — APPOINTMENT (OUTPATIENT)
Dept: OBGYN | Facility: CLINIC | Age: 50
End: 2020-10-22
Payer: MEDICARE

## 2020-10-22 VITALS
WEIGHT: 230 LBS | BODY MASS INDEX: 40.75 KG/M2 | DIASTOLIC BLOOD PRESSURE: 84 MMHG | HEIGHT: 63 IN | TEMPERATURE: 97.7 F | SYSTOLIC BLOOD PRESSURE: 144 MMHG

## 2020-10-22 VITALS
SYSTOLIC BLOOD PRESSURE: 138 MMHG | DIASTOLIC BLOOD PRESSURE: 80 MMHG | OXYGEN SATURATION: 97 % | HEART RATE: 94 BPM | BODY MASS INDEX: 40.75 KG/M2 | HEIGHT: 63 IN | WEIGHT: 230 LBS | RESPIRATION RATE: 16 BRPM

## 2020-10-22 DIAGNOSIS — N90.89 OTHER SPECIFIED NONINFLAMMATORY DISORDERS OF VULVA AND PERINEUM: ICD-10-CM

## 2020-10-22 PROCEDURE — 99214 OFFICE O/P EST MOD 30 MIN: CPT

## 2020-10-22 PROCEDURE — 99213 OFFICE O/P EST LOW 20 MIN: CPT

## 2020-10-22 NOTE — DISCUSSION/SUMMARY
[Obstructive Sleep Apnea] : obstructive sleep apnea [Severe] : severe [Responding to Treatment] : responding to treatment [Alcohol Avoidance] : alcohol avoidance [Sedative Avoidance] : sedative avoidance [Weight Loss Program] : weight loss program [de-identified] : Patient compliant with CPAP/BiPAP and benefiting from therapy [de-identified] : Sample Dreamware Gel pillow mask given to improve comfort

## 2020-10-22 NOTE — HISTORY OF PRESENT ILLNESS
[Obstructive Sleep Apnea] : obstructive sleep apnea [Awakes with Headache] : awakes with headache [Snoring] : snoring [Home] : home [APAP:] : APAP [Full Face mask] : full face mask [TextBox_77] : 12am [TextBox_79] : 8am [TextBox_81] : 30 [TextBox_89] : 2 [TextBox_100] : 5/22/20 [TextBox_108] : 54.8 [TextBox_125] : 4-16 [TextBox_127] : 9/21/20 [TextBox_129] : 10/20/20 [TextBox_133] : 90 [TextBox_137] : 90 [TextBox_147] : 1.5 [TextBox_158] : Yemi [TextBox_160] : F35 [TextBox_162] : 6/8/20 [TextBox_164] : 6/8/25 [TextBox_165] : Still tired and having difficulty with mask fit

## 2020-10-22 NOTE — CONSULT LETTER
[Dear  ___] : Dear  [unfilled], [Consult Letter:] : I had the pleasure of evaluating your patient, [unfilled]. [Please see my note below.] : Please see my note below. [Consult Closing:] : Thank you very much for allowing me to participate in the care of this patient.  If you have any questions, please do not hesitate to contact me. [Sincerely,] : Sincerely, [FreeTextEntry3] : Bladimir Mckinnon MD FCCP\par Pulmonary/Critical Care/Sleep Medicine\par Department of Internal Medicine\par \par [facility]\par

## 2020-10-22 NOTE — PHYSICAL EXAM
[Normal] : uterus [No Bleeding] : there was no active vaginal bleeding [Uterine Adnexae] : were not tender and not enlarged [de-identified] : Clitoris is tender to touch with q tip. Swab taken from area. Clitioris is a little blue in hue but no site of bleeding noted.

## 2020-10-23 PROCEDURE — 99442: CPT | Mod: 95

## 2020-10-26 PROCEDURE — 90853 GROUP PSYCHOTHERAPY: CPT

## 2020-10-27 LAB — BACTERIA GENITAL AEROBE CULT: NORMAL

## 2020-11-02 PROCEDURE — 90853 GROUP PSYCHOTHERAPY: CPT

## 2020-11-09 ENCOUNTER — EMERGENCY (EMERGENCY)
Facility: HOSPITAL | Age: 50
LOS: 1 days | End: 2020-11-09
Admitting: EMERGENCY MEDICINE
Payer: MEDICARE

## 2020-11-09 PROCEDURE — 99284 EMERGENCY DEPT VISIT MOD MDM: CPT | Mod: CS

## 2020-11-09 PROCEDURE — 74176 CT ABD & PELVIS W/O CONTRAST: CPT | Mod: 26

## 2020-11-16 PROCEDURE — 90853 GROUP PSYCHOTHERAPY: CPT

## 2020-11-16 PROCEDURE — 99442: CPT | Mod: 95

## 2020-11-23 PROCEDURE — 90853 GROUP PSYCHOTHERAPY: CPT

## 2020-11-30 ENCOUNTER — APPOINTMENT (OUTPATIENT)
Dept: OBGYN | Facility: CLINIC | Age: 50
End: 2020-11-30

## 2020-12-09 PROCEDURE — 99442: CPT | Mod: 95

## 2020-12-22 ENCOUNTER — EMERGENCY (EMERGENCY)
Facility: HOSPITAL | Age: 50
LOS: 1 days | End: 2020-12-22
Admitting: EMERGENCY MEDICINE
Payer: MEDICARE

## 2020-12-22 PROCEDURE — 99285 EMERGENCY DEPT VISIT HI MDM: CPT

## 2020-12-22 PROCEDURE — 71045 X-RAY EXAM CHEST 1 VIEW: CPT | Mod: 26

## 2020-12-22 PROCEDURE — 93010 ELECTROCARDIOGRAM REPORT: CPT

## 2021-01-04 ENCOUNTER — APPOINTMENT (OUTPATIENT)
Dept: CARDIOLOGY | Facility: CLINIC | Age: 51
End: 2021-01-04
Payer: MEDICARE

## 2021-01-04 ENCOUNTER — NON-APPOINTMENT (OUTPATIENT)
Age: 51
End: 2021-01-04

## 2021-01-04 VITALS
HEIGHT: 63 IN | WEIGHT: 220 LBS | SYSTOLIC BLOOD PRESSURE: 140 MMHG | DIASTOLIC BLOOD PRESSURE: 92 MMHG | OXYGEN SATURATION: 98 % | TEMPERATURE: 97.1 F | BODY MASS INDEX: 38.98 KG/M2 | HEART RATE: 89 BPM

## 2021-01-04 PROCEDURE — 90853 GROUP PSYCHOTHERAPY: CPT

## 2021-01-04 PROCEDURE — 93000 ELECTROCARDIOGRAM COMPLETE: CPT

## 2021-01-04 PROCEDURE — 99214 OFFICE O/P EST MOD 30 MIN: CPT

## 2021-01-04 RX ORDER — PANTOPRAZOLE SODIUM 40 MG/1
40 GRANULE, DELAYED RELEASE ORAL
Refills: 0 | Status: DISCONTINUED | COMMUNITY
End: 2021-01-04

## 2021-01-04 RX ORDER — ARIPIPRAZOLE 2 MG/1
2 TABLET ORAL DAILY
Refills: 0 | Status: ACTIVE | COMMUNITY

## 2021-01-04 NOTE — DISCUSSION/SUMMARY
[FreeTextEntry1] : This is a 50 year F with the above PMH and below problems \par \par No further chest pains or angina.  Mild dyspnea on exertion is unchanged and most likely from physical conditioning and overweight and recovering from Covid 2 months ago.  She also has LORA\par \par Normal LV function. JANIS was unremarkable.  Normal course by CTA.  EKG in the office today is unremarkable\par \par History  of GERD.\par \par Hypertension: Not well controlled, continue with Losartan 100 mg and HCTZ 25 mg daily.  With diabetes, target blood pressure less than 130/80.  Also she has slight high resting heart rate.  Add low-dose Toprol.\par \par Patient educated and advised to follow active lifestyle with regular cardiovascular exercise, as well as diet modification\par \par Follow-up in 6 months; follow-up on echocardiogram (previously borderline EF)\par \par Thank you for this referral and allowing me to participate in the care of this patient.  If can be of any further help or  if you have any questions, please do not hesitate to contact me\par \par \par Sincerely,\par \par Gregorio Stephenson MD, FACC, VENICE

## 2021-01-04 NOTE — PHYSICAL EXAM
[General Appearance - Well Developed] : well developed [Normal Appearance] : normal appearance [Well Groomed] : well groomed [General Appearance - Well Nourished] : well nourished [No Deformities] : no deformities [General Appearance - In No Acute Distress] : no acute distress [Normal Conjunctiva] : the conjunctiva exhibited no abnormalities [Eyelids - No Xanthelasma] : the eyelids demonstrated no xanthelasmas [Normal Oral Mucosa] : normal oral mucosa [No Oral Pallor] : no oral pallor [No Oral Cyanosis] : no oral cyanosis [Respiration, Rhythm And Depth] : normal respiratory rhythm and effort [Exaggerated Use Of Accessory Muscles For Inspiration] : no accessory muscle use [Gait - Sufficient For Exercise Testing] : the gait was sufficient for exercise testing [Nail Clubbing] : no clubbing of the fingernails [Cyanosis, Localized] : no localized cyanosis [Skin Color & Pigmentation] : normal skin color and pigmentation [Skin Turgor] : normal skin turgor [] : no rash [Oriented To Time, Place, And Person] : oriented to person, place, and time [FreeTextEntry1] : Obese

## 2021-01-04 NOTE — HISTORY OF PRESENT ILLNESS
[FreeTextEntry1] : This is a 50 year female with the below PMH hypertension, obesity, diabetes, GERD, OCD. \par \par Was evaluated in the ER [facility] on 12/22/2020 for cough, diarrhea and dizziness.  She was Covid positive in November 2020.\par \par She denies chest pain, pressure, palpitations, unusual shortness of breath, orthopnea, LE edema, lightheadedness, near syncope or syncope.  She has vague dizziness and mild cough since Covid and this is improving\par \par She does not have exertional angina\par \par Nuclear stress test\par March 19, 2020: Dejuan protocol, 9 minutes, patient asymptomatic for chest pain or leg claudication, EKG suggestive for ischemia with 2 mm horizontal downsloping ST segment depressions inferolaterally, with frequent PVCs during stress, and moderate shortness of breath at peak.  Abnormal SPECT.\par \par Subsequent  CT coronary calcium score was 0 in April 2020 with normal CTA of the coronaries\par \par Echo:\par March 19, 2020;EF 50-55%, no aortic dilatation, mild MR, mild systolic dysfunction basal and mid lateral wall and mid inferolateral hypokinesis, false tendon noted in left ventricle normal diastolic function\par \par JANIS:\par She denies any claudication.  JANIS suggested non-compressible study to left calf with 1.30 reading (reference 1.29 normal) March 11, 2020, right leg within normal limits. (Patient without symptoms of leg pain or claudication during exercise nuclear stress test on 3/19/2020)\par \par No past history of MI, CHF, TIA, CVA, syncope.\par \par She is compliant with medications, not with lifestyle.  She eats a lot of processed food

## 2021-01-11 PROCEDURE — 99442: CPT | Mod: 95

## 2021-01-25 ENCOUNTER — APPOINTMENT (OUTPATIENT)
Dept: PULMONOLOGY | Facility: CLINIC | Age: 51
End: 2021-01-25

## 2021-02-08 ENCOUNTER — APPOINTMENT (OUTPATIENT)
Dept: PULMONOLOGY | Facility: CLINIC | Age: 51
End: 2021-02-08
Payer: MEDICARE

## 2021-02-08 VITALS
HEART RATE: 77 BPM | OXYGEN SATURATION: 96 % | DIASTOLIC BLOOD PRESSURE: 82 MMHG | WEIGHT: 223 LBS | BODY MASS INDEX: 39.5 KG/M2 | SYSTOLIC BLOOD PRESSURE: 120 MMHG

## 2021-02-08 DIAGNOSIS — U07.1 COVID-19: ICD-10-CM

## 2021-02-08 DIAGNOSIS — J98.8 COVID-19: ICD-10-CM

## 2021-02-08 PROCEDURE — 99215 OFFICE O/P EST HI 40 MIN: CPT | Mod: CS

## 2021-02-08 PROCEDURE — 90853 GROUP PSYCHOTHERAPY: CPT | Mod: 95

## 2021-02-08 RX ORDER — AZITHROMYCIN 250 MG/1
250 TABLET, FILM COATED ORAL
Qty: 6 | Refills: 0 | Status: DISCONTINUED | COMMUNITY
Start: 2020-12-16 | End: 2021-02-08

## 2021-02-08 RX ORDER — VALACYCLOVIR 1 G/1
1 TABLET, FILM COATED ORAL
Qty: 21 | Refills: 0 | Status: DISCONTINUED | COMMUNITY
Start: 2020-12-09 | End: 2021-02-08

## 2021-02-08 RX ORDER — TAMSULOSIN HYDROCHLORIDE 0.4 MG/1
0.4 CAPSULE ORAL
Qty: 7 | Refills: 0 | Status: DISCONTINUED | COMMUNITY
Start: 2020-11-09 | End: 2021-02-08

## 2021-02-08 RX ORDER — NITROFURANTOIN (MONOHYDRATE/MACROCRYSTALS) 25; 75 MG/1; MG/1
100 CAPSULE ORAL
Qty: 14 | Refills: 0 | Status: DISCONTINUED | COMMUNITY
Start: 2020-10-28 | End: 2021-02-08

## 2021-02-08 RX ORDER — METFORMIN HYDROCHLORIDE 500 MG/1
500 TABLET, COATED ORAL TWICE DAILY
Refills: 0 | Status: ACTIVE | COMMUNITY
Start: 2021-01-10

## 2021-02-08 RX ORDER — CEPHALEXIN 500 MG/1
500 CAPSULE ORAL
Qty: 14 | Refills: 0 | Status: DISCONTINUED | COMMUNITY
Start: 2020-11-01 | End: 2021-02-08

## 2021-02-08 RX ORDER — DOXYCYCLINE 100 MG/1
100 CAPSULE ORAL
Qty: 14 | Refills: 0 | Status: DISCONTINUED | COMMUNITY
Start: 2020-09-23 | End: 2021-02-08

## 2021-02-08 RX ORDER — ARIPIPRAZOLE 5 MG/1
5 TABLET ORAL
Qty: 30 | Refills: 0 | Status: DISCONTINUED | COMMUNITY
Start: 2020-07-16 | End: 2021-02-08

## 2021-02-08 RX ORDER — METFORMIN HYDROCHLORIDE 1000 MG/1
1000 TABLET, COATED ORAL
Refills: 0 | Status: DISCONTINUED | COMMUNITY
Start: 2020-02-13 | End: 2021-02-08

## 2021-02-08 RX ORDER — DOXYCYCLINE HYCLATE 100 MG/1
100 TABLET ORAL
Qty: 14 | Refills: 0 | Status: DISCONTINUED | COMMUNITY
Start: 2020-11-09 | End: 2021-02-08

## 2021-02-08 NOTE — CONSULT LETTER
[Dear  ___] : Dear  [unfilled], [Consult Letter:] : I had the pleasure of evaluating your patient, [unfilled]. [Please see my note below.] : Please see my note below. [Consult Closing:] : Thank you very much for allowing me to participate in the care of this patient.  If you have any questions, please do not hesitate to contact me. [Sincerely,] : Sincerely, [FreeTextEntry3] : Bladimir Mckinnon MD FCCP\par Pulmonary/Critical Care/Sleep Medicine\par Department of Internal Medicine\par \par [facility]\par

## 2021-02-08 NOTE — DISCUSSION/SUMMARY
[Obstructive Sleep Apnea] : obstructive sleep apnea [Severe] : severe [Responding to Treatment] : responding to treatment [Alcohol Avoidance] : alcohol avoidance [Sedative Avoidance] : sedative avoidance [Weight Loss Program] : weight loss program [CPAP] : CPAP [FreeTextEntry1] : Patient's complaints of dyspnea, most likely due to prolonged symptoms of Covid 19 pneumonitis. A CAT scan of the chest be performed without contrast, as well as full pulmonary function tests. [de-identified] : Patient compliant with CPAP/BiPAP and benefiting from therapy

## 2021-02-08 NOTE — PHYSICAL EXAM
[No Acute Distress] : no acute distress [Normal Oropharynx] : normal oropharynx [IV] : Mallampati Class: IV [Normal Appearance] : normal appearance [Neck Circumference: ___] : neck circumference: [unfilled] [No Neck Mass] : no neck mass [Normal Rate/Rhythm] : normal rate/rhythm [Normal S1, S2] : normal s1, s2 [No Murmurs] : no murmurs [No Resp Distress] : no resp distress [Clear to Auscultation Bilaterally] : clear to auscultation bilaterally [No Abnormalities] : no abnormalities [Benign] : benign [Normal Gait] : normal gait [No Clubbing] : no clubbing [No Cyanosis] : no cyanosis [No Edema] : no edema [FROM] : FROM [Normal Color/ Pigmentation] : normal color/ pigmentation [Oriented x3] : oriented x3 [No Focal Deficits] : no focal deficits [Normal Affect] : normal affect

## 2021-02-08 NOTE — HISTORY OF PRESENT ILLNESS
[Obstructive Sleep Apnea] : obstructive sleep apnea [Home] : home [APAP:] : APAP [Full Face mask] : full face mask [TextBox_4] : Patient suffered an episode of Covid 19 in late November. At that time. She was complaining of abdominal pain. An abdominal CAT scan demonstrated the presence of mild basilar infiltrates. Her symptoms included fevers, myalgias, arthralgias, diarrhea. She is now hospitalized and did not receive monoclonal antibody. The symptoms resolved by early December, but she has noted persistent mild shortness of breath. Has no complaints of cough, wheeze, sputum. She notices predominately with exercise and has had no nocturnal awakenings [TextBox_77] : 12am [TextBox_79] : 8am [TextBox_81] : 30 [TextBox_89] : 2 [TextBox_100] : 5/22/20 [TextBox_108] : 54.8 [TextBox_125] : 4-16 [TextBox_127] : 1/9/21 [TextBox_129] : 2/7/21 [TextBox_133] : 93 [TextBox_137] : 87 [TextBox_147] : 2.8 [TextBox_158] : Yemi [TextBox_160] : F33 [TextBox_162] : 6/8/20 [TextBox_164] : 6/8/25

## 2021-02-15 ENCOUNTER — APPOINTMENT (OUTPATIENT)
Dept: OBGYN | Facility: CLINIC | Age: 51
End: 2021-02-15
Payer: MEDICARE

## 2021-02-15 VITALS
HEIGHT: 63 IN | BODY MASS INDEX: 40.22 KG/M2 | DIASTOLIC BLOOD PRESSURE: 80 MMHG | WEIGHT: 227 LBS | SYSTOLIC BLOOD PRESSURE: 132 MMHG

## 2021-02-15 PROCEDURE — 99212 OFFICE O/P EST SF 10 MIN: CPT

## 2021-02-22 PROCEDURE — 90853 GROUP PSYCHOTHERAPY: CPT | Mod: 95

## 2021-02-23 ENCOUNTER — OUTPATIENT (OUTPATIENT)
Dept: OUTPATIENT SERVICES | Facility: HOSPITAL | Age: 51
LOS: 1 days | End: 2021-02-23
Payer: MEDICARE

## 2021-02-23 ENCOUNTER — RESULT REVIEW (OUTPATIENT)
Age: 51
End: 2021-02-23

## 2021-02-23 DIAGNOSIS — R11.2 NAUSEA WITH VOMITING, UNSPECIFIED: ICD-10-CM

## 2021-02-23 LAB — GLUCOSE BLDC GLUCOMTR-MCNC: 166 MG/DL — HIGH (ref 70–99)

## 2021-02-23 PROCEDURE — 78264 GASTRIC EMPTYING IMG STUDY: CPT

## 2021-02-23 PROCEDURE — A9541: CPT

## 2021-02-23 PROCEDURE — 82962 GLUCOSE BLOOD TEST: CPT

## 2021-02-23 PROCEDURE — 78264 GASTRIC EMPTYING IMG STUDY: CPT | Mod: 26,GC

## 2021-02-24 ENCOUNTER — OUTPATIENT (OUTPATIENT)
Dept: OUTPATIENT SERVICES | Facility: HOSPITAL | Age: 51
LOS: 1 days | End: 2021-02-24

## 2021-02-26 ENCOUNTER — APPOINTMENT (OUTPATIENT)
Dept: MRI IMAGING | Facility: CLINIC | Age: 51
End: 2021-02-26
Payer: MEDICARE

## 2021-02-26 PROCEDURE — 70551 MRI BRAIN STEM W/O DYE: CPT | Mod: QQ

## 2021-03-01 PROCEDURE — 90853 GROUP PSYCHOTHERAPY: CPT | Mod: 95

## 2021-03-08 ENCOUNTER — RESULT REVIEW (OUTPATIENT)
Age: 51
End: 2021-03-08

## 2021-03-08 ENCOUNTER — APPOINTMENT (OUTPATIENT)
Dept: CT IMAGING | Facility: CLINIC | Age: 51
End: 2021-03-08
Payer: MEDICARE

## 2021-03-08 PROCEDURE — G1004: CPT

## 2021-03-08 PROCEDURE — 71250 CT THORAX DX C-: CPT | Mod: MG

## 2021-03-08 PROCEDURE — 90853 GROUP PSYCHOTHERAPY: CPT | Mod: 95

## 2021-03-09 ENCOUNTER — NON-APPOINTMENT (OUTPATIENT)
Age: 51
End: 2021-03-09

## 2021-03-15 PROCEDURE — 90853 GROUP PSYCHOTHERAPY: CPT | Mod: 95

## 2021-03-16 ENCOUNTER — APPOINTMENT (OUTPATIENT)
Dept: GASTROENTEROLOGY | Facility: CLINIC | Age: 51
End: 2021-03-16
Payer: MEDICARE

## 2021-03-16 VITALS
SYSTOLIC BLOOD PRESSURE: 133 MMHG | DIASTOLIC BLOOD PRESSURE: 91 MMHG | BODY MASS INDEX: 40.04 KG/M2 | TEMPERATURE: 94.7 F | RESPIRATION RATE: 14 BRPM | WEIGHT: 226 LBS | HEART RATE: 71 BPM | HEIGHT: 63 IN

## 2021-03-16 PROCEDURE — 99214 OFFICE O/P EST MOD 30 MIN: CPT

## 2021-03-16 NOTE — HISTORY OF PRESENT ILLNESS
[de-identified] : Patient seen here 6 months ago with multiple complaints including dry heaves early satiety diarrhea reflux disease.  She had an endoscopy done by another gastroenterologist in April which supposedly showed erosive gastritis.  She also had a colonoscopy done.  I asked for those records to be sent but I have not received them.  Patient says the diarrhea is little better.  I recommended possibly switching her off Metformin and she is going to see her doctor about doing that.  In addition the patient had Covid in November and now she says that she still has a cough and in fact the dry heaves are worse when she coughs.  She has no dysphagia or odynophagia.  As you know she takes pantoprazole she has no heartburn but still has the other symptoms.  She had a nuclear medicine gastric emptying test which was normal and showed no evidence of gastroparesis

## 2021-03-16 NOTE — PHYSICAL EXAM
[General Appearance - Alert] : alert [General Appearance - In No Acute Distress] : in no acute distress [Sclera] : the sclera and conjunctiva were normal [PERRL With Normal Accommodation] : pupils were equal in size, round, and reactive to light [Extraocular Movements] : extraocular movements were intact [] : no respiratory distress [Auscultation Breath Sounds / Voice Sounds] : lungs were clear to auscultation bilaterally [Heart Rate And Rhythm] : heart rate was normal and rhythm regular [Heart Sounds] : normal S1 and S2 [Heart Sounds Gallop] : no gallops [Murmurs] : no murmurs [Heart Sounds Pericardial Friction Rub] : no pericardial rub [Oriented To Time, Place, And Person] : oriented to person, place, and time [Impaired Insight] : insight and judgment were intact [Affect] : the affect was normal

## 2021-03-16 NOTE — ASSESSMENT
[FreeTextEntry1] : I reviewed the results of the patient's test with her.  There was no evidence of gastroparesis.  I thought some of her symptoms could be related to her gastritis and advised her to increase the pantoprazole to twice a day.  I also asked her to talk to her psychiatrist about adjusting her medication as perhaps the dry heaves are related to one of her antidepressants.  In addition a lot of her symptoms are related to cough and she is seeing a pulmonologist in a week or 2 and perhaps with improving her post Covid symptoms the symptoms will improve as well.  She will follow up in 6 weeks.  I also asked her to get the results of her prior test and we will determine if she needs another endoscopy or any other testing as well.  If she still has significant symptoms we will consider other medications and possibly treat her symptomatically with Zofran.

## 2021-03-20 DIAGNOSIS — Z01.818 ENCOUNTER FOR OTHER PREPROCEDURAL EXAMINATION: ICD-10-CM

## 2021-03-21 ENCOUNTER — APPOINTMENT (OUTPATIENT)
Dept: DISASTER EMERGENCY | Facility: CLINIC | Age: 51
End: 2021-03-21

## 2021-03-22 LAB — SARS-COV-2 N GENE NPH QL NAA+PROBE: NOT DETECTED

## 2021-03-25 ENCOUNTER — APPOINTMENT (OUTPATIENT)
Dept: PULMONOLOGY | Facility: CLINIC | Age: 51
End: 2021-03-25
Payer: MEDICARE

## 2021-03-25 VITALS
DIASTOLIC BLOOD PRESSURE: 80 MMHG | OXYGEN SATURATION: 97 % | SYSTOLIC BLOOD PRESSURE: 118 MMHG | HEART RATE: 67 BPM | RESPIRATION RATE: 15 BRPM

## 2021-03-25 VITALS — TEMPERATURE: 97.9 F | HEIGHT: 63 IN | WEIGHT: 226 LBS | BODY MASS INDEX: 40.04 KG/M2

## 2021-03-25 PROCEDURE — 85018 HEMOGLOBIN: CPT | Mod: QW

## 2021-03-25 PROCEDURE — 94010 BREATHING CAPACITY TEST: CPT

## 2021-03-25 PROCEDURE — 94727 GAS DIL/WSHOT DETER LNG VOL: CPT

## 2021-03-25 PROCEDURE — 94729 DIFFUSING CAPACITY: CPT

## 2021-03-25 PROCEDURE — 99215 OFFICE O/P EST HI 40 MIN: CPT | Mod: 25

## 2021-03-25 RX ORDER — PANTOPRAZOLE 40 MG/1
40 TABLET, DELAYED RELEASE ORAL
Qty: 90 | Refills: 2 | Status: DISCONTINUED | COMMUNITY
Start: 2020-09-24 | End: 2021-03-25

## 2021-03-25 NOTE — DISCUSSION/SUMMARY
[Obstructive Sleep Apnea] : obstructive sleep apnea [Severe] : severe [Responding to Treatment] : responding to treatment [Alcohol Avoidance] : alcohol avoidance [Sedative Avoidance] : sedative avoidance [Weight Loss Program] : weight loss program [CPAP] : CPAP [FreeTextEntry1] : Status post Covid 19. Residual minimal infiltrates seen on CAT scan. Lung function, normal. Patient advised to increase her activity and to use a cough suppressant expectorant p.r.n. [de-identified] : Patient compliant with CPAP/BiPAP and benefiting from therapy

## 2021-03-25 NOTE — HISTORY OF PRESENT ILLNESS
[Obstructive Sleep Apnea] : obstructive sleep apnea [Home] : home [APAP:] : APAP [Full Face mask] : full face mask [Never] : never [TextBox_4] : Patient suffered an episode of Covid 19 in late November. At that time. She was complaining of abdominal pain. An abdominal CAT scan demonstrated the presence of mild basilar infiltrates. Her symptoms included fevers, myalgias, arthralgias, diarrhea. She is now hospitalized and did not receive monoclonal antibody. The symptoms resolved by early December, but she has noted persistent mild shortness of breath. Has no complaints of cough, wheeze, sputum. She notices predominately with exercise and has had no nocturnal awakenings\par \par Patient continues to complain of mild shortness of breath with minimal cough [TextBox_77] : 12am [TextBox_79] : 8am [TextBox_81] : 30 [TextBox_89] : 2 [TextBox_100] : 5/22/20 [TextBox_108] : 54.8 [TextBox_125] : 4-16 [TextBox_127] : 2/22/21 [TextBox_129] : 3/23/21 [TextBox_133] : 90 [TextBox_137] : 83 [TextBox_141] : 4 [TextBox_143] : 50 [TextBox_147] : 1.7 [TextBox_158] : Yemi [TextBox_160] : F34 [TextBox_162] : 6/8/20 [TextBox_164] : 6/8/25

## 2021-03-25 NOTE — CONSULT LETTER
[Dear  ___] : Dear  [unfilled], [Consult Letter:] : I had the pleasure of evaluating your patient, [unfilled]. [Please see my note below.] : Please see my note below. [Consult Closing:] : Thank you very much for allowing me to participate in the care of this patient.  If you have any questions, please do not hesitate to contact me. [Sincerely,] : Sincerely, [FreeTextEntry3] : Bladimir Mckinnon MD FCCP\par Pulmonary/Critical Care/Sleep Medicine\par Department of Internal Medicine\par \par [facility]\par

## 2021-03-29 ENCOUNTER — APPOINTMENT (OUTPATIENT)
Dept: OBGYN | Facility: CLINIC | Age: 51
End: 2021-03-29
Payer: MEDICARE

## 2021-03-29 ENCOUNTER — LABORATORY RESULT (OUTPATIENT)
Age: 51
End: 2021-03-29

## 2021-03-29 VITALS
SYSTOLIC BLOOD PRESSURE: 120 MMHG | DIASTOLIC BLOOD PRESSURE: 76 MMHG | WEIGHT: 225 LBS | HEIGHT: 63 IN | BODY MASS INDEX: 39.87 KG/M2

## 2021-03-29 DIAGNOSIS — Z00.00 ENCOUNTER FOR GENERAL ADULT MEDICAL EXAMINATION W/OUT ABNORMAL FINDINGS: ICD-10-CM

## 2021-03-29 PROCEDURE — 99213 OFFICE O/P EST LOW 20 MIN: CPT

## 2021-03-29 PROCEDURE — 90853 GROUP PSYCHOTHERAPY: CPT | Mod: 95

## 2021-03-29 RX ORDER — GABAPENTIN 300 MG/1
300 CAPSULE ORAL
Qty: 14 | Refills: 0 | Status: DISCONTINUED | COMMUNITY
Start: 2020-12-16 | End: 2021-03-29

## 2021-03-29 NOTE — HISTORY OF PRESENT ILLNESS
[FreeTextEntry1] : 50yo G0 PMF here with c/o pain and bleeding. Pt has manual clitoral stimulation 2 days ago and had pain and bleeding. Also c/o burning with urination.

## 2021-03-29 NOTE — PHYSICAL EXAM
[Labia Majora] : normal [Labia Minora] : normal [Normal] : normal [Uterine Adnexae] : normal [FreeTextEntry1] : Pt has excitoriations in upper region ogf her vulvar near the clitoris exactly where she c/o pain. No pain at clitoris with exam with a q-tip.  [FreeTextEntry2] : bloody abrasions on the upper, inner poles of labia majora b/l. Sensitive to touch with a q-tip. No pain elicited when clitoris was touched with q-tip

## 2021-03-29 NOTE — DISCUSSION/SUMMARY
[FreeTextEntry1] : 50yo G0 PMF with abrasions on labia majora from manual stimulation \par \par - Pt to abstain from intercourse to allow healing of abrasions and excoriations \par - Pt to have partner cut nails prior to stimulation \par - RTO for annual \par - All questions solicited and answered \par \par Felicia Chavez MD \par Obstetrician/Gynecologist\par

## 2021-03-29 NOTE — DISCUSSION/SUMMARY
[FreeTextEntry1] : 50yo G0 PMF here for annual.\par \par RHM: \par - DVS neg x 3\par - Dentist, PCP, Derm as discussed \par - Rx given for DEXA, mammo/sono\par - Colonoscopy as discussed \par - Offered STI screen today. Pt declined\par - Encouraged healthy diet, exercise, weight loss\par \par Felicia Vo MD \par Obstetrician/Gynecologist\par \par

## 2021-03-29 NOTE — PHYSICAL EXAM
[Appropriately responsive] : appropriately responsive [Alert] : alert [No Acute Distress] : no acute distress [No Lymphadenopathy] : no lymphadenopathy [Regular Rate Rhythm] : regular rate rhythm [No Murmurs] : no murmurs [Clear to Auscultation B/L] : clear to auscultation bilaterally [Soft] : soft [Non-tender] : non-tender [Non-distended] : non-distended [No HSM] : No HSM [No Lesions] : no lesions [No Mass] : no mass [Oriented x3] : oriented x3 [FreeTextEntry7] : obese  [Examination Of The Breasts] : a normal appearance [No Masses] : no breast masses were palpable [Labia Majora] : normal [Labia Minora] : normal [Normal] : normal [Uterine Adnexae] : normal [FreeTextEntry1] : Excoriations from previous exam are no longer present

## 2021-03-30 ENCOUNTER — NON-APPOINTMENT (OUTPATIENT)
Age: 51
End: 2021-03-30

## 2021-03-31 ENCOUNTER — NON-APPOINTMENT (OUTPATIENT)
Age: 51
End: 2021-03-31

## 2021-04-05 LAB
CYTOLOGY CVX/VAG DOC THIN PREP: NORMAL
DATE COLLECTED: NORMAL
HEMOCCULT SP1 STL QL: NEGATIVE
HPV HIGH+LOW RISK DNA PNL CVX: NORMAL
QUALITY CONTROL: YES

## 2021-04-05 PROCEDURE — 99214 OFFICE O/P EST MOD 30 MIN: CPT | Mod: 95

## 2021-04-05 PROCEDURE — 90853 GROUP PSYCHOTHERAPY: CPT | Mod: 95

## 2021-04-12 PROCEDURE — 90853 GROUP PSYCHOTHERAPY: CPT | Mod: 95

## 2021-04-19 PROCEDURE — 90853 GROUP PSYCHOTHERAPY: CPT | Mod: 95

## 2021-04-21 ENCOUNTER — APPOINTMENT (OUTPATIENT)
Dept: CT IMAGING | Facility: CLINIC | Age: 51
End: 2021-04-21
Payer: MEDICARE

## 2021-04-21 PROCEDURE — 74176 CT ABD & PELVIS W/O CONTRAST: CPT | Mod: MH

## 2021-04-26 PROCEDURE — 90853 GROUP PSYCHOTHERAPY: CPT | Mod: 95

## 2021-04-28 ENCOUNTER — APPOINTMENT (OUTPATIENT)
Dept: MAMMOGRAPHY | Facility: CLINIC | Age: 51
End: 2021-04-28
Payer: MEDICARE

## 2021-04-28 ENCOUNTER — APPOINTMENT (OUTPATIENT)
Dept: ULTRASOUND IMAGING | Facility: CLINIC | Age: 51
End: 2021-04-28
Payer: MEDICARE

## 2021-04-28 ENCOUNTER — APPOINTMENT (OUTPATIENT)
Dept: RADIOLOGY | Facility: CLINIC | Age: 51
End: 2021-04-28
Payer: MEDICARE

## 2021-04-28 ENCOUNTER — RESULT REVIEW (OUTPATIENT)
Age: 51
End: 2021-04-28

## 2021-04-28 PROCEDURE — 76641 ULTRASOUND BREAST COMPLETE: CPT | Mod: 50

## 2021-04-28 PROCEDURE — 77067 SCR MAMMO BI INCL CAD: CPT

## 2021-04-28 PROCEDURE — 77080 DXA BONE DENSITY AXIAL: CPT

## 2021-04-28 PROCEDURE — 77063 BREAST TOMOSYNTHESIS BI: CPT

## 2021-05-03 PROCEDURE — 90853 GROUP PSYCHOTHERAPY: CPT | Mod: 95

## 2021-05-04 ENCOUNTER — APPOINTMENT (OUTPATIENT)
Dept: GASTROENTEROLOGY | Facility: CLINIC | Age: 51
End: 2021-05-04

## 2021-05-10 PROCEDURE — 90853 GROUP PSYCHOTHERAPY: CPT | Mod: 95

## 2021-05-17 PROCEDURE — 90853 GROUP PSYCHOTHERAPY: CPT | Mod: 95

## 2021-05-20 PROCEDURE — 98968 PH1 ASSMT&MGMT NQHP 21-30: CPT | Mod: CR

## 2021-05-24 PROCEDURE — 90853 GROUP PSYCHOTHERAPY: CPT | Mod: 95

## 2021-06-19 ENCOUNTER — TRANSCRIPTION ENCOUNTER (OUTPATIENT)
Age: 51
End: 2021-06-19

## 2021-06-28 ENCOUNTER — APPOINTMENT (OUTPATIENT)
Dept: CARDIOLOGY | Facility: CLINIC | Age: 51
End: 2021-06-28
Payer: MEDICARE

## 2021-06-28 VITALS
BODY MASS INDEX: 37.56 KG/M2 | DIASTOLIC BLOOD PRESSURE: 72 MMHG | HEART RATE: 66 BPM | SYSTOLIC BLOOD PRESSURE: 126 MMHG | OXYGEN SATURATION: 99 % | HEIGHT: 63 IN | TEMPERATURE: 97.7 F | WEIGHT: 212 LBS

## 2021-06-28 PROCEDURE — 93306 TTE W/DOPPLER COMPLETE: CPT

## 2021-06-28 PROCEDURE — 99214 OFFICE O/P EST MOD 30 MIN: CPT

## 2021-06-28 RX ORDER — BLOOD SUGAR DIAGNOSTIC
STRIP MISCELLANEOUS
Qty: 100 | Refills: 0 | Status: ACTIVE | COMMUNITY
Start: 2021-02-15

## 2021-06-28 RX ORDER — LANCETS 28 GAUGE
EACH MISCELLANEOUS
Qty: 100 | Refills: 0 | Status: ACTIVE | COMMUNITY
Start: 2021-02-15

## 2021-06-28 RX ORDER — BLOOD-GLUCOSE METER
KIT MISCELLANEOUS
Qty: 1 | Refills: 0 | Status: ACTIVE | COMMUNITY
Start: 2021-02-15

## 2021-06-28 NOTE — DISCUSSION/SUMMARY
[FreeTextEntry1] : This is a 50 year F;\par \par No chest pains or angina.  Mild dyspnea on exertion is unchanged and most likely from physical conditioning and overweight and recovering from Covid 2 months ago.  She also has LORA\par \par Normal LV function. JANIS was unremarkable.  Normal course by CTA.  EKG in the office today is unremarkable\par \par History  of GERD.\par \par Hypertension:  well controlled, continue with Losartan 100 mg and HCTZ 25 mg daily.  With diabetes, target blood pressure less than 130/80.  \par \par Patient educated and advised to follow active lifestyle with regular cardiovascular exercise, as well as diet modification\par \par Follow-up in 12 months; \par \par Thank you for this referral and allowing me to participate in the care of this patient.  If can be of any further help or  if you have any questions, please do not hesitate to contact me\par \par \par Sincerely,\par \par Gregorio Stephenson MD, FACC, VENICE

## 2021-06-28 NOTE — PHYSICAL EXAM
[General Appearance - Well Developed] : well developed [Normal Appearance] : normal appearance [Well Groomed] : well groomed [General Appearance - Well Nourished] : well nourished [No Deformities] : no deformities [General Appearance - In No Acute Distress] : no acute distress [Normal Conjunctiva] : the conjunctiva exhibited no abnormalities [Eyelids - No Xanthelasma] : the eyelids demonstrated no xanthelasmas [Normal Oral Mucosa] : normal oral mucosa [No Oral Pallor] : no oral pallor [No Oral Cyanosis] : no oral cyanosis [Exaggerated Use Of Accessory Muscles For Inspiration] : no accessory muscle use [Respiration, Rhythm And Depth] : normal respiratory rhythm and effort [FreeTextEntry1] : Obese [Gait - Sufficient For Exercise Testing] : the gait was sufficient for exercise testing [Nail Clubbing] : no clubbing of the fingernails [Cyanosis, Localized] : no localized cyanosis [Skin Color & Pigmentation] : normal skin color and pigmentation [Skin Turgor] : normal skin turgor [] : no rash [Oriented To Time, Place, And Person] : oriented to person, place, and time

## 2021-06-28 NOTE — HISTORY OF PRESENT ILLNESS
[FreeTextEntry1] : This is a 51 year female with the below PMH hypertension, obesity, diabetes, GERD, OCD. \par \par She was Covid positive in November 2020.\par \par She denies chest pain, pressure, palpitations, unusual shortness of breath, orthopnea, LE edema, lightheadedness, near syncope or syncope.  She has vague dizziness and mild cough since Covid and this is improving\par \par She does not have exertional angina\par \par Nuclear stress test\par March 19, 2020: Dejuan protocol, 9 minutes, patient asymptomatic for chest pain or leg claudication, EKG suggestive for ischemia with 2 mm horizontal downsloping ST segment depressions inferolaterally, with frequent PVCs during stress, and moderate shortness of breath at peak.  Abnormal SPECT.\par \par Subsequent  CT coronary calcium score was 0 in April 2020 with normal CTA of the coronaries\par \par Echo:\par March 19, 2020;EF 50-55%, no aortic dilatation, mild MR, mild systolic dysfunction basal and mid lateral wall and mid inferolateral hypokinesis, false tendon noted in left ventricle normal diastolic function.  Repeat echocardiogram today in June 2021 does not have significant changes\par \par JANIS:\par She denies any claudication.  JANIS suggested non-compressible study to left calf with 1.30 reading (reference 1.29 normal) March 11, 2020, right leg within normal limits. (Patient without symptoms of leg pain or claudication during exercise nuclear stress test on 3/19/2020)\par \par No past history of MI, CHF, TIA, CVA, syncope.\par \par She is compliant with medications, not with lifestyle.  She eats a lot of processed food

## 2021-07-12 PROCEDURE — 99214 OFFICE O/P EST MOD 30 MIN: CPT | Mod: 95

## 2021-09-02 ENCOUNTER — APPOINTMENT (OUTPATIENT)
Dept: GASTROENTEROLOGY | Facility: CLINIC | Age: 51
End: 2021-09-02
Payer: MEDICARE

## 2021-09-02 VITALS
HEIGHT: 63 IN | HEART RATE: 82 BPM | DIASTOLIC BLOOD PRESSURE: 72 MMHG | TEMPERATURE: 97.2 F | SYSTOLIC BLOOD PRESSURE: 122 MMHG | WEIGHT: 215 LBS | RESPIRATION RATE: 15 BRPM | OXYGEN SATURATION: 98 % | BODY MASS INDEX: 38.09 KG/M2

## 2021-09-02 PROCEDURE — 99214 OFFICE O/P EST MOD 30 MIN: CPT

## 2021-09-02 NOTE — HISTORY OF PRESENT ILLNESS
[de-identified] : Patient presents with same complaints.  Intermittent nausea and vomiting.  It sounds like she did respond to ondansetron but she is not taking it anymore.  She had a negative gastric emptying test.  Her GI work-up has been completely negative.  Patient is on 4 different antidepressants including Abilify Klonopin Luvox and Wellbutrin.

## 2021-09-02 NOTE — ASSESSMENT
[FreeTextEntry1] : I discussed with the patient that it would be reasonable to try treating her on a regular basis with ondansetron 4 mg 3-4 times a day.  She should give this a couple weeks and see how she does.  In addition she is on 4 different medications for depression and certainly possible that some of these are giving her untoward effect such as the nausea.  I asked her to discuss this with her therapist.  It would be reasonable to try to get off 1 or 2 of these medications.

## 2021-09-07 ENCOUNTER — RX RENEWAL (OUTPATIENT)
Age: 51
End: 2021-09-07

## 2021-09-08 ENCOUNTER — RX RENEWAL (OUTPATIENT)
Age: 51
End: 2021-09-08

## 2021-09-15 ENCOUNTER — OUTPATIENT (OUTPATIENT)
Dept: OUTPATIENT SERVICES | Facility: HOSPITAL | Age: 51
LOS: 1 days | End: 2021-09-15

## 2021-09-20 ENCOUNTER — APPOINTMENT (OUTPATIENT)
Dept: PULMONOLOGY | Facility: CLINIC | Age: 51
End: 2021-09-20
Payer: MEDICARE

## 2021-09-20 VITALS
DIASTOLIC BLOOD PRESSURE: 70 MMHG | SYSTOLIC BLOOD PRESSURE: 140 MMHG | WEIGHT: 216 LBS | BODY MASS INDEX: 38.26 KG/M2 | OXYGEN SATURATION: 97 % | HEART RATE: 78 BPM

## 2021-09-20 PROCEDURE — 99214 OFFICE O/P EST MOD 30 MIN: CPT

## 2021-09-20 NOTE — HISTORY OF PRESENT ILLNESS
[Never] : never [Obstructive Sleep Apnea] : obstructive sleep apnea [Home] : home [APAP:] : APAP [Full Face mask] : full face mask [TextBox_4] : COvid 19 Nov 2020\par Not hospitialized \par No residual SOB\par Mild intermittent cough localized to base of nigh >supine and AM [TextBox_77] : 12am [TextBox_81] : 30 [TextBox_79] : 8am [TextBox_89] : 2 [TextBox_100] : 5/22/20 [TextBox_108] : 54.8 [TextBox_125] : 4-16 [TextBox_127] : 8/17/21 [TextBox_129] : 9/15/21 [TextBox_133] : 80 [TextBox_137] : 80 [TextBox_141] : 4 [TextBox_143] : 45 [TextBox_147] : 1 [TextBox_158] : Yemi [TextBox_160] : F38 [TextBox_162] : 6/8/20 [TextBox_164] : 6/8/25 [ESS] : 3

## 2021-09-20 NOTE — CONSULT LETTER
[Dear  ___] : Dear  [unfilled], [Consult Letter:] : I had the pleasure of evaluating your patient, [unfilled]. [Please see my note below.] : Please see my note below. [Consult Closing:] : Thank you very much for allowing me to participate in the care of this patient.  If you have any questions, please do not hesitate to contact me. [Sincerely,] : Sincerely, [FreeTextEntry3] : Bladimir Mckinnon MD FCCP\par Pulmonary/Critical Care/Sleep Medicine\par Department of Internal Medicine\par \par [facility] [employer]\par

## 2021-09-20 NOTE — PHYSICAL EXAM
[No Acute Distress] : no acute distress [Normal Oropharynx] : normal oropharynx [IV] : Mallampati Class: IV [Neck Circumference: ___] : neck circumference: [unfilled] [Normal Appearance] : normal appearance [No Neck Mass] : no neck mass [Normal Rate/Rhythm] : normal rate/rhythm [Normal S1, S2] : normal s1, s2 [No Murmurs] : no murmurs [No Resp Distress] : no resp distress [Clear to Auscultation Bilaterally] : clear to auscultation bilaterally [Benign] : benign [No Abnormalities] : no abnormalities [Normal Gait] : normal gait [No Clubbing] : no clubbing [No Cyanosis] : no cyanosis [No Edema] : no edema [Normal Color/ Pigmentation] : normal color/ pigmentation [FROM] : FROM [No Focal Deficits] : no focal deficits [Oriented x3] : oriented x3 [Normal Affect] : normal affect

## 2021-09-20 NOTE — DISCUSSION/SUMMARY
[Obstructive Sleep Apnea] : obstructive sleep apnea [Severe] : severe [Responding to Treatment] : responding to treatment [Alcohol Avoidance] : alcohol avoidance [Sedative Avoidance] : sedative avoidance [CPAP] : CPAP [Weight Loss Program] : weight loss program [FreeTextEntry1] : Cough secondary to mild reflux\par Avoid caffeinated foods and beverages such as coffee, tea, chocolates. Raise the head of her bed by about 3 inches or use a foam wedge. Avoid alcohol and alcohol-based products. Reduce the size of meals with the last meal being approximately 2-3 hours before bedtime. Lastly, continue the use of your proton pump inhibitor or H2 blocker.\par  [de-identified] : Patient compliant with CPAP/BiPAP and benefiting from therapy

## 2021-10-04 PROCEDURE — 99214 OFFICE O/P EST MOD 30 MIN: CPT | Mod: 95

## 2021-10-08 ENCOUNTER — EMERGENCY (EMERGENCY)
Facility: HOSPITAL | Age: 51
LOS: 1 days | End: 2021-10-08
Admitting: EMERGENCY MEDICINE
Payer: MEDICARE

## 2021-10-08 PROCEDURE — 99284 EMERGENCY DEPT VISIT MOD MDM: CPT

## 2021-10-08 PROCEDURE — 71260 CT THORAX DX C+: CPT | Mod: 26

## 2021-10-08 PROCEDURE — 74177 CT ABD & PELVIS W/CONTRAST: CPT | Mod: 26

## 2021-10-08 PROCEDURE — 70450 CT HEAD/BRAIN W/O DYE: CPT | Mod: 26

## 2021-10-08 PROCEDURE — 72125 CT NECK SPINE W/O DYE: CPT | Mod: 26

## 2021-10-08 PROCEDURE — 73030 X-RAY EXAM OF SHOULDER: CPT | Mod: 26,LT

## 2021-10-18 ENCOUNTER — APPOINTMENT (OUTPATIENT)
Age: 51
End: 2021-10-18
Payer: MEDICARE

## 2021-10-18 DIAGNOSIS — M54.50 LOW BACK PAIN, UNSPECIFIED: ICD-10-CM

## 2021-10-18 PROCEDURE — 99203 OFFICE O/P NEW LOW 30 MIN: CPT

## 2021-10-18 NOTE — RESULTS/DATA
[FreeTextEntry1] : Review of her CT scan of the lumbar spine shows no gross abnormalities or obvious fracture seen

## 2021-10-18 NOTE — REASON FOR VISIT
[New Patient Visit] : a new patient visit [Referred By: _________] : Patient was referred by SAMANTHA [FreeTextEntry1] : Lower back pain- [facility] ER follow up

## 2021-10-18 NOTE — PLAN
[FreeTextEntry1] : This is a 51-year-old female that complains of low back pain after fall 2 weeks ago.  At this time I would like to recommend conservative management which includes physical therapy as well as anti-inflammatories and muscle relaxers as prescribed.  Patient can follow-up in the future if her symptoms do not resolve.  I have answered all the patient's questions.  Her mother was present for the visit

## 2021-10-18 NOTE — HISTORY OF PRESENT ILLNESS
[FreeTextEntry1] : Low back pain [de-identified] : 50-year-old female status post fall 2 weeks ago in which she landed on her back since then she been complaining of bilateral low back pain without radiation to legs.  Denies any numbness tingling weakness or bladder bowel dysfunction.  She is taking ibuprofen without relief in symptoms.  She has not had any physical therapy or pain management.  The pain is unimproved since the fall.  She went to [facility] and received CAT scans of her spine.  She was told to follow-up follow-up with neurosurgery

## 2021-11-11 ENCOUNTER — APPOINTMENT (OUTPATIENT)
Dept: GASTROENTEROLOGY | Facility: CLINIC | Age: 51
End: 2021-11-11
Payer: MEDICARE

## 2021-11-11 VITALS
HEART RATE: 74 BPM | DIASTOLIC BLOOD PRESSURE: 82 MMHG | BODY MASS INDEX: 39.16 KG/M2 | RESPIRATION RATE: 14 BRPM | HEIGHT: 63 IN | WEIGHT: 221 LBS | TEMPERATURE: 97.5 F | SYSTOLIC BLOOD PRESSURE: 126 MMHG | OXYGEN SATURATION: 97 %

## 2021-11-11 DIAGNOSIS — F32.9 MAJOR DEPRESSIVE DISORDER, SINGLE EPISODE, UNSPECIFIED: ICD-10-CM

## 2021-11-11 PROCEDURE — 99215 OFFICE O/P EST HI 40 MIN: CPT

## 2021-11-11 RX ORDER — MECLIZINE HYDROCHLORIDE 25 MG/1
TABLET ORAL
Refills: 0 | Status: DISCONTINUED | COMMUNITY
End: 2021-11-11

## 2021-11-11 RX ORDER — CEFDINIR 300 MG/1
300 CAPSULE ORAL
Qty: 20 | Refills: 0 | Status: DISCONTINUED | COMMUNITY
Start: 2021-05-31 | End: 2021-11-11

## 2021-11-11 NOTE — HISTORY OF PRESENT ILLNESS
[de-identified] : Patient returns for follow-up.  Patient states he still has dry heaves.  The nausea only happens occasionally and she takes ondansetron as needed which seems to help.  She also has a chronic cough.  She has LORA.  She saw an ENT physician who saw erythema on laryngoscopy and told her this was due to acid reflux but she is taking pantoprazole twice a day for quite some time with no change in her symptoms.  In addition her pulmonologist also assume that her cough is related to reflux.  She has no dysphagia odynophagia or abdominal pain.\par \par In addition the patient was complaining of some diarrhea and start herself on probiotics and her Metformin was cut in half.  Diarrhea is now gone but now she is complained of constipation.  She also has excessive flatulence and sometimes when she passes gas she sees a little bit of staining on her underwear.

## 2021-11-11 NOTE — ASSESSMENT
[FreeTextEntry1] : I discussed with the patient that her symptoms could be related to reflux and LPR considering the cough and the findings by the ENT physician, however the fact that she has been on twice daily PPIs for a number of months with no change in her symptoms which suggest that it may not be that although people can have refractory symptoms despite taking twice daily PPIs she did have an endoscopy a year and a half ago which was unremarkable but I felt that should be repeated again given the persistence of symptoms despite appropriate treatment.  If that test is negative she may need further testing such as esophageal pH testing with [facility].\par \par I also discussed that I felt her symptoms vis-à-vis her bowel issues were related to IBS.  Her diarrhea did get better with cutting back on Metformin but now she is constipated and complain of excessive flatulence and leakage.  I recommend that she take Citrucel 1 tablespoon in 8 ounce of cold water daily empirically to see if this helps ameliorate her symptoms.  In addition I told her to stop probiotics as there was no indication for it.  The indication benefits risks alternatives to endoscopy were discussed with the patient and she is medically optimal for the planned procedure

## 2021-12-02 ENCOUNTER — EMERGENCY (EMERGENCY)
Facility: HOSPITAL | Age: 51
LOS: 1 days | End: 2021-12-02
Admitting: EMERGENCY MEDICINE
Payer: MEDICARE

## 2021-12-02 PROCEDURE — 99283 EMERGENCY DEPT VISIT LOW MDM: CPT | Mod: 25

## 2021-12-02 PROCEDURE — 69200 CLEAR OUTER EAR CANAL: CPT

## 2021-12-06 ENCOUNTER — APPOINTMENT (OUTPATIENT)
Dept: OPHTHALMOLOGY | Facility: CLINIC | Age: 51
End: 2021-12-06
Payer: MEDICARE

## 2021-12-06 ENCOUNTER — NON-APPOINTMENT (OUTPATIENT)
Age: 51
End: 2021-12-06

## 2021-12-06 PROCEDURE — 92014 COMPRE OPH EXAM EST PT 1/>: CPT

## 2022-01-01 ENCOUNTER — APPOINTMENT (OUTPATIENT)
Dept: DISASTER EMERGENCY | Facility: CLINIC | Age: 52
End: 2022-01-01

## 2022-01-03 ENCOUNTER — TRANSCRIPTION ENCOUNTER (OUTPATIENT)
Age: 52
End: 2022-01-03

## 2022-01-04 ENCOUNTER — RESULT REVIEW (OUTPATIENT)
Age: 52
End: 2022-01-04

## 2022-01-04 ENCOUNTER — APPOINTMENT (OUTPATIENT)
Dept: GASTROENTEROLOGY | Facility: GI CENTER | Age: 52
End: 2022-01-04
Payer: MEDICARE

## 2022-01-04 ENCOUNTER — OUTPATIENT (OUTPATIENT)
Dept: OUTPATIENT SERVICES | Facility: HOSPITAL | Age: 52
LOS: 1 days | End: 2022-01-04
Payer: MEDICARE

## 2022-01-04 DIAGNOSIS — K21.9 GASTRO-ESOPHAGEAL REFLUX DISEASE WITHOUT ESOPHAGITIS: ICD-10-CM

## 2022-01-04 LAB — GLUCOSE BLDC GLUCOMTR-MCNC: 185 MG/DL — HIGH (ref 70–99)

## 2022-01-04 PROCEDURE — 88342 IMHCHEM/IMCYTCHM 1ST ANTB: CPT | Mod: 26

## 2022-01-04 PROCEDURE — 88342 IMHCHEM/IMCYTCHM 1ST ANTB: CPT

## 2022-01-04 PROCEDURE — 88305 TISSUE EXAM BY PATHOLOGIST: CPT

## 2022-01-04 PROCEDURE — 82962 GLUCOSE BLOOD TEST: CPT

## 2022-01-04 PROCEDURE — 43239 EGD BIOPSY SINGLE/MULTIPLE: CPT

## 2022-01-04 PROCEDURE — 88305 TISSUE EXAM BY PATHOLOGIST: CPT | Mod: 26

## 2022-01-07 LAB — SURGICAL PATHOLOGY STUDY: SIGNIFICANT CHANGE UP

## 2022-01-19 ENCOUNTER — TRANSCRIPTION ENCOUNTER (OUTPATIENT)
Age: 52
End: 2022-01-19

## 2022-01-20 ENCOUNTER — NON-APPOINTMENT (OUTPATIENT)
Age: 52
End: 2022-01-20

## 2022-02-06 ENCOUNTER — EMERGENCY (EMERGENCY)
Facility: HOSPITAL | Age: 52
LOS: 1 days | Discharge: HOME | End: 2022-02-06
Admitting: EMERGENCY MEDICINE
Payer: MEDICARE

## 2022-02-06 PROCEDURE — 10120 INC&RMVL FB SUBQ TISS SMPL: CPT

## 2022-02-06 PROCEDURE — 71045 X-RAY EXAM CHEST 1 VIEW: CPT | Mod: 26

## 2022-02-06 PROCEDURE — 93010 ELECTROCARDIOGRAM REPORT: CPT

## 2022-02-06 PROCEDURE — 99285 EMERGENCY DEPT VISIT HI MDM: CPT | Mod: 25

## 2022-02-08 DIAGNOSIS — J18.9 PNEUMONIA, UNSPECIFIED ORGANISM: ICD-10-CM
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
## 2022-02-08 DIAGNOSIS — T16.1XXA FOREIGN BODY IN RIGHT EAR, INITIAL ENCOUNTER: ICD-10-CM
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
## 2022-02-08 DIAGNOSIS — R06.00 DYSPNEA, UNSPECIFIED: ICD-10-CM

## 2022-02-26 ENCOUNTER — NON-APPOINTMENT (OUTPATIENT)
Age: 52
End: 2022-02-26

## 2022-03-09 ENCOUNTER — OUTPATIENT (OUTPATIENT)
Dept: OUTPATIENT SERVICES | Facility: HOSPITAL | Age: 52
LOS: 1 days | End: 2022-03-09

## 2022-03-09 ENCOUNTER — OUTPATIENT (OUTPATIENT)
Dept: OUTPATIENT SERVICES | Facility: HOSPITAL | Age: 52
LOS: 1 days | Discharge: HOME | End: 2022-03-09

## 2022-03-09 ENCOUNTER — EMERGENCY (EMERGENCY)
Facility: HOSPITAL | Age: 52
LOS: 1 days | Discharge: HOME | End: 2022-03-09
Admitting: EMERGENCY MEDICINE
Payer: MEDICARE

## 2022-03-09 DIAGNOSIS — R07.89 OTHER CHEST PAIN: ICD-10-CM

## 2022-03-09 DIAGNOSIS — I10 ESSENTIAL (PRIMARY) HYPERTENSION: ICD-10-CM

## 2022-03-09 DIAGNOSIS — R10.33 PERIUMBILICAL PAIN: ICD-10-CM

## 2022-03-09 DIAGNOSIS — E03.9 HYPOTHYROIDISM, UNSPECIFIED: ICD-10-CM

## 2022-03-09 DIAGNOSIS — R51.9 HEADACHE, UNSPECIFIED: ICD-10-CM

## 2022-03-09 DIAGNOSIS — Z90.49 ACQUIRED ABSENCE OF OTHER SPECIFIED PARTS OF DIGESTIVE TRACT: ICD-10-CM

## 2022-03-09 DIAGNOSIS — R11.2 NAUSEA WITH VOMITING, UNSPECIFIED: ICD-10-CM

## 2022-03-09 DIAGNOSIS — E11.9 TYPE 2 DIABETES MELLITUS WITHOUT COMPLICATIONS: ICD-10-CM

## 2022-03-09 DIAGNOSIS — F42.2 MIXED OBSESSIONAL THOUGHTS AND ACTS: ICD-10-CM

## 2022-03-09 PROCEDURE — 74177 CT ABD & PELVIS W/CONTRAST: CPT | Mod: 26

## 2022-03-09 PROCEDURE — 71045 X-RAY EXAM CHEST 1 VIEW: CPT | Mod: 26

## 2022-03-09 PROCEDURE — 93010 ELECTROCARDIOGRAM REPORT: CPT

## 2022-03-09 PROCEDURE — 99284 EMERGENCY DEPT VISIT MOD MDM: CPT

## 2022-03-09 PROCEDURE — 70450 CT HEAD/BRAIN W/O DYE: CPT | Mod: 26

## 2022-03-19 PROCEDURE — 93010 ELECTROCARDIOGRAM REPORT: CPT

## 2022-03-19 PROCEDURE — 71045 X-RAY EXAM CHEST 1 VIEW: CPT | Mod: 26

## 2022-03-19 PROCEDURE — 99285 EMERGENCY DEPT VISIT HI MDM: CPT | Mod: FS,CS

## 2022-03-20 ENCOUNTER — OUTPATIENT (OUTPATIENT)
Dept: OUTPATIENT SERVICES | Facility: HOSPITAL | Age: 52
LOS: 1 days | End: 2022-03-20

## 2022-03-20 PROCEDURE — 99220: CPT

## 2022-03-21 ENCOUNTER — INPATIENT (INPATIENT)
Facility: HOSPITAL | Age: 52
LOS: 0 days | Discharge: HOME | End: 2022-03-22
Payer: MEDICARE

## 2022-03-21 ENCOUNTER — OUTPATIENT (OUTPATIENT)
Dept: OUTPATIENT SERVICES | Facility: HOSPITAL | Age: 52
LOS: 1 days | End: 2022-03-21

## 2022-03-21 PROCEDURE — 70551 MRI BRAIN STEM W/O DYE: CPT | Mod: 26

## 2022-03-21 PROCEDURE — 99233 SBSQ HOSP IP/OBS HIGH 50: CPT

## 2022-03-21 PROCEDURE — 70544 MR ANGIOGRAPHY HEAD W/O DYE: CPT | Mod: 26,59

## 2022-03-21 PROCEDURE — 93458 L HRT ARTERY/VENTRICLE ANGIO: CPT | Mod: 26

## 2022-03-21 PROCEDURE — 99222 1ST HOSP IP/OBS MODERATE 55: CPT | Mod: 25

## 2022-03-22 ENCOUNTER — OUTPATIENT (OUTPATIENT)
Dept: OUTPATIENT SERVICES | Facility: HOSPITAL | Age: 52
LOS: 1 days | End: 2022-03-22

## 2022-03-23 ENCOUNTER — APPOINTMENT (OUTPATIENT)
Dept: CARDIOLOGY | Facility: CLINIC | Age: 52
End: 2022-03-23
Payer: MEDICARE

## 2022-03-23 VITALS
SYSTOLIC BLOOD PRESSURE: 122 MMHG | OXYGEN SATURATION: 97 % | BODY MASS INDEX: 41.46 KG/M2 | TEMPERATURE: 97.3 F | DIASTOLIC BLOOD PRESSURE: 62 MMHG | HEIGHT: 63 IN | WEIGHT: 234 LBS | HEART RATE: 70 BPM

## 2022-03-23 PROCEDURE — 99213 OFFICE O/P EST LOW 20 MIN: CPT

## 2022-03-23 RX ORDER — CYCLOBENZAPRINE HYDROCHLORIDE 5 MG/1
5 TABLET, FILM COATED ORAL 3 TIMES DAILY
Qty: 60 | Refills: 1 | Status: DISCONTINUED | COMMUNITY
Start: 2021-10-18 | End: 2022-03-23

## 2022-03-23 RX ORDER — PANTOPRAZOLE SODIUM 20 MG/1
TABLET, DELAYED RELEASE ORAL
Refills: 0 | Status: DISCONTINUED | COMMUNITY
End: 2022-03-23

## 2022-03-23 RX ORDER — CLONAZEPAM 0.5 MG/1
0.5 TABLET ORAL
Refills: 0 | Status: DISCONTINUED | COMMUNITY
Start: 2020-02-13 | End: 2022-03-23

## 2022-03-23 RX ORDER — PREDNISONE 20 MG/1
20 TABLET ORAL
Qty: 3 | Refills: 0 | Status: DISCONTINUED | COMMUNITY
Start: 2021-06-19 | End: 2022-03-23

## 2022-03-23 RX ORDER — CELECOXIB 200 MG/1
200 CAPSULE ORAL
Qty: 30 | Refills: 2 | Status: DISCONTINUED | COMMUNITY
Start: 2021-10-19 | End: 2022-03-23

## 2022-03-23 RX ORDER — CELECOXIB 200 MG/1
200 CAPSULE ORAL
Qty: 30 | Refills: 2 | Status: DISCONTINUED | COMMUNITY
Start: 2021-10-18 | End: 2022-03-23

## 2022-03-23 NOTE — ASSESSMENT
[FreeTextEntry1] : Abnormal stress test: Invasive coronary angiography revealed normal coronary arteries.\par \par Hypertension: Well-controlled.\par \par Right radial site is healing normally.

## 2022-03-24 DIAGNOSIS — Z82.49 FAMILY HISTORY OF ISCHEMIC HEART DISEASE AND OTHER DISEASES OF THE CIRCULATORY SYSTEM: ICD-10-CM

## 2022-03-24 DIAGNOSIS — F41.9 ANXIETY DISORDER, UNSPECIFIED: ICD-10-CM

## 2022-03-24 DIAGNOSIS — E66.01 MORBID (SEVERE) OBESITY DUE TO EXCESS CALORIES: ICD-10-CM

## 2022-03-24 DIAGNOSIS — E11.42 TYPE 2 DIABETES MELLITUS WITH DIABETIC POLYNEUROPATHY: ICD-10-CM

## 2022-03-24 DIAGNOSIS — F32.A DEPRESSION, UNSPECIFIED: ICD-10-CM

## 2022-03-24 DIAGNOSIS — F42.9 OBSESSIVE-COMPULSIVE DISORDER, UNSPECIFIED: ICD-10-CM

## 2022-03-24 DIAGNOSIS — E03.9 HYPOTHYROIDISM, UNSPECIFIED: ICD-10-CM

## 2022-03-24 DIAGNOSIS — R07.9 CHEST PAIN, UNSPECIFIED: ICD-10-CM

## 2022-03-24 DIAGNOSIS — I10 ESSENTIAL (PRIMARY) HYPERTENSION: ICD-10-CM

## 2022-03-24 DIAGNOSIS — E87.6 HYPOKALEMIA: ICD-10-CM

## 2022-03-24 DIAGNOSIS — Z20.822 CONTACT WITH AND (SUSPECTED) EXPOSURE TO COVID-19: ICD-10-CM

## 2022-03-24 DIAGNOSIS — G47.33 OBSTRUCTIVE SLEEP APNEA (ADULT) (PEDIATRIC): ICD-10-CM

## 2022-03-24 DIAGNOSIS — Z79.84 LONG TERM (CURRENT) USE OF ORAL HYPOGLYCEMIC DRUGS: ICD-10-CM

## 2022-03-24 DIAGNOSIS — F14.11 COCAINE ABUSE, IN REMISSION: ICD-10-CM

## 2022-03-24 DIAGNOSIS — G43.909 MIGRAINE, UNSPECIFIED, NOT INTRACTABLE, WITHOUT STATUS MIGRAINOSUS: ICD-10-CM

## 2022-03-24 DIAGNOSIS — F10.11 ALCOHOL ABUSE, IN REMISSION: ICD-10-CM

## 2022-03-28 ENCOUNTER — OUTPATIENT (OUTPATIENT)
Dept: OUTPATIENT SERVICES | Facility: HOSPITAL | Age: 52
LOS: 1 days | End: 2022-03-28

## 2022-03-28 DIAGNOSIS — R53.83 OTHER FATIGUE: ICD-10-CM

## 2022-03-31 DIAGNOSIS — A08.4 VIRAL INTESTINAL INFECTION, UNSPECIFIED: ICD-10-CM

## 2022-03-31 DIAGNOSIS — I10 ESSENTIAL (PRIMARY) HYPERTENSION: ICD-10-CM

## 2022-03-31 DIAGNOSIS — R10.0 ACUTE ABDOMEN: ICD-10-CM

## 2022-03-31 DIAGNOSIS — R11.2 NAUSEA WITH VOMITING, UNSPECIFIED: ICD-10-CM

## 2022-03-31 DIAGNOSIS — E03.9 HYPOTHYROIDISM, UNSPECIFIED: ICD-10-CM

## 2022-03-31 DIAGNOSIS — E11.65 TYPE 2 DIABETES MELLITUS WITH HYPERGLYCEMIA: ICD-10-CM

## 2022-03-31 DIAGNOSIS — R07.89 OTHER CHEST PAIN: ICD-10-CM

## 2022-04-11 DIAGNOSIS — R07.9 CHEST PAIN, UNSPECIFIED: ICD-10-CM

## 2022-04-11 DIAGNOSIS — G43.919 MIGRAINE, UNSPECIFIED, INTRACTABLE, WITHOUT STATUS MIGRAINOSUS: ICD-10-CM

## 2022-04-12 DIAGNOSIS — G43.919 MIGRAINE, UNSPECIFIED, INTRACTABLE, WITHOUT STATUS MIGRAINOSUS: ICD-10-CM

## 2022-04-12 DIAGNOSIS — R07.9 CHEST PAIN, UNSPECIFIED: ICD-10-CM

## 2022-04-18 DIAGNOSIS — R07.9 CHEST PAIN, UNSPECIFIED: ICD-10-CM

## 2022-05-11 ENCOUNTER — EMERGENCY (EMERGENCY)
Facility: HOSPITAL | Age: 52
LOS: 1 days | Discharge: HOME | End: 2022-05-11
Admitting: EMERGENCY MEDICINE
Payer: MEDICARE

## 2022-05-11 PROCEDURE — 71045 X-RAY EXAM CHEST 1 VIEW: CPT | Mod: 26

## 2022-05-11 PROCEDURE — 99285 EMERGENCY DEPT VISIT HI MDM: CPT

## 2022-05-12 DIAGNOSIS — R11.2 NAUSEA WITH VOMITING, UNSPECIFIED: ICD-10-CM

## 2022-05-12 DIAGNOSIS — R10.9 UNSPECIFIED ABDOMINAL PAIN: ICD-10-CM

## 2022-05-12 DIAGNOSIS — Z87.442 PERSONAL HISTORY OF URINARY CALCULI: ICD-10-CM

## 2022-05-12 DIAGNOSIS — R10.13 EPIGASTRIC PAIN: ICD-10-CM

## 2022-05-12 DIAGNOSIS — Z90.49 ACQUIRED ABSENCE OF OTHER SPECIFIED PARTS OF DIGESTIVE TRACT: ICD-10-CM

## 2022-05-12 DIAGNOSIS — E11.9 TYPE 2 DIABETES MELLITUS WITHOUT COMPLICATIONS: ICD-10-CM

## 2022-05-12 DIAGNOSIS — M54.9 DORSALGIA, UNSPECIFIED: ICD-10-CM

## 2022-05-12 DIAGNOSIS — N39.0 URINARY TRACT INFECTION, SITE NOT SPECIFIED: ICD-10-CM

## 2022-05-12 DIAGNOSIS — I10 ESSENTIAL (PRIMARY) HYPERTENSION: ICD-10-CM

## 2022-05-12 PROCEDURE — 74177 CT ABD & PELVIS W/CONTRAST: CPT | Mod: 26,MH

## 2022-05-12 PROCEDURE — 93010 ELECTROCARDIOGRAM REPORT: CPT

## 2022-05-15 ENCOUNTER — NON-APPOINTMENT (OUTPATIENT)
Age: 52
End: 2022-05-15

## 2022-05-16 ENCOUNTER — OUTPATIENT (OUTPATIENT)
Dept: OUTPATIENT SERVICES | Facility: HOSPITAL | Age: 52
LOS: 1 days | End: 2022-05-16

## 2022-05-16 DIAGNOSIS — R10.10 UPPER ABDOMINAL PAIN, UNSPECIFIED: ICD-10-CM

## 2022-05-20 ENCOUNTER — OUTPATIENT (OUTPATIENT)
Dept: OUTPATIENT SERVICES | Facility: HOSPITAL | Age: 52
LOS: 1 days | End: 2022-05-20
Payer: MEDICARE

## 2022-05-20 DIAGNOSIS — R13.19 OTHER DYSPHAGIA: ICD-10-CM

## 2022-05-20 DIAGNOSIS — K21.9 GASTRO-ESOPHAGEAL REFLUX DISEASE WITHOUT ESOPHAGITIS: ICD-10-CM

## 2022-05-20 PROCEDURE — 91034 GASTROESOPHAGEAL REFLUX TEST: CPT

## 2022-05-27 ENCOUNTER — APPOINTMENT (OUTPATIENT)
Dept: UROGYNECOLOGY | Facility: CLINIC | Age: 52
End: 2022-05-27
Payer: MEDICARE

## 2022-05-27 VITALS
HEIGHT: 63 IN | BODY MASS INDEX: 39.69 KG/M2 | WEIGHT: 224 LBS | SYSTOLIC BLOOD PRESSURE: 145 MMHG | DIASTOLIC BLOOD PRESSURE: 85 MMHG

## 2022-05-27 DIAGNOSIS — N95.2 POSTMENOPAUSAL ATROPHIC VAGINITIS: ICD-10-CM

## 2022-05-27 PROCEDURE — 99214 OFFICE O/P EST MOD 30 MIN: CPT

## 2022-05-27 PROCEDURE — 99204 OFFICE O/P NEW MOD 45 MIN: CPT

## 2022-05-27 NOTE — DISCUSSION/SUMMARY
[FreeTextEntry1] : The patient was counseled regarding the pathophysiology of the above conditions. A total of approximately 60 minutes was spent on this visit, greater than 50%of which was spent on counseling. She was also counseled regarding the risks, benefits, indications, and alternatives of further evaluations studies, as well as various management options. She was given verbal and written information/education on pelvic floor muscle exercises, avoidance of dietary bladder irritants, and other strategies to improve bladder control. She was counseled regarding treatment options for stress urinary incontinence including pelvic floor PT, pessary placement and surgical management with midurethral sling. AUGS interactive tool was used to explain normal anatomy as well as alteration in urethral support associated with stress urinary incontinence. Midurethral sling placement as well as urethral bulking was discussed. Pharmacologic management of overactive bladder and urgency incontinence was discussed. After a detailed discussion, following management plan was outlined:\par 1. Patient is interested in surgical management of KANIKA. Urodynamic testing ordered. \par 2. behavioral modification (avoidance of bladder irritants). weight loss and  bladder retraining. she will start the home exercise program as instructed.\par 3. UA with micro and urine culture was ordered to exclude UTI. Will discuss cystoscopy, renal imaging and options for UTI prophylaxis if meets the criteria for frequent UTI.\par 4. Advised taking antihypertensive medication in the morning instead of evening.  Night time fluid restriction and other strategies to decrease nocturia were reviewed. Also discussed compliance with use of CPAP machine\par 5. Discussed importance of maintaining optimal glycemic control in for bladder health.\par 6. Defer trial of OAB medication till urodynamic testing.\par 7. Discussed sacral neuromodulation due to presence of dual (fecal and urine incontinence). She has no rectocele on exam. She has followup with Dr. Schulz\par 8. Discussed avoidance of caffeine and complex carbohydrates and advised use of fiber supplement. Also advised het to discuss alternative to Metformin with her PCP \par 9. Discussed management of atrophic vaginitis. She will start with nonhormonal options. \par 10. F/u after urodynamic testing

## 2022-05-27 NOTE — PHYSICAL EXAM
[Chaperone Present] : A chaperone was present in the examining room during all aspects of the physical examination [FreeTextEntry1] : General: Not in acute distress, alert and oriented x3.\par Neck: Supple. No lymphadenopathy. \par Abdomen: Soft, nontender, and nondistended. No obvious hepatosplenomegaly. No obvious hernias. \par Pelvic Exam: Normal external female genitalia. Saddle sensory exam S2 to S4 is intact. Perineal reflexes not visualized. Urethra with minimal hypermobility without prolapse, exudates, or lesions. Cough stress test is negative. Post void residual was checked with I/O cath and was 60 cc of clear urine. Pale and mildly atrophic-appearing vaginal epithelium. No vaginal blood or discharge. Cervix without abnormal lesions. Bimanual exam reveals a small uterus in normal positioning. No adnexal masses or tenderness. Levator ani contraction is 2/5. All vaginal compartments are well supported

## 2022-05-27 NOTE — OB HISTORY
[Total Preg ___] : : [unfilled] [Definite ___ (Date)] : the last menstrual period was [unfilled] [Last Pap Smear ___] : date of last pap smear was on [unfilled] [FreeTextEntry1] : wnl per patient

## 2022-05-27 NOTE — ASSESSMENT
[FreeTextEntry1] : Patient is a 52-year-old multipara with mixed urinary incontinence (stress greater than urge), overactive bladder,  nocturia, fecal incontinence and atrophic vaginitis. On examination, there is evidence of some loss of urethral support with a negative empty bladder supine cough stress test, normal postvoid residual, and fair levator ani awareness/contraction. She has  history of recurrent UTI. She admits to consumption of a few bladder irritants. Other potential contributing factors include class II obesity, intake of anti-hypertensive, sleep apnea and diabetes.\par \par . \par \par \par \par \par \par \par \par \par

## 2022-05-27 NOTE — HISTORY OF PRESENT ILLNESS
[FreeTextEntry1] : Patient is a 52 years old G0 with DM (HbA1C) , sleep apnea, class II obesity, who is referred by Dr. Rodas \reshma Schulz for evaluation and management of urinary incontinence\par Patient reports "peeing on herself" for past 2 year. She leaks with cough or sneeze. Sometimes she leaks if she doesn't get to the bathroom in time. She had an accident in the store once. Most of the time she can make it the bathroom.She wears a poise pad for past 1 year, goes through 2 pads per day (wet). Wears to bed, usually doesn't leak during sleep. \par Daytime frequency: q 30  min\par Nocturia: 3 times per night, has sleep apnea, uses CPAP machine consistently\par Reports frequent bladder infection: 2-3 per year. Reports recent UTI 3 weeks go\par Reports intermittent sensation of incomplete bladder emptying\par No prior treatments for incontinence\par Daily fluid intake: water, soda: 2 cans per day, 1 cup of coffee, hot tea once a week\par Fecal incontinence started ~ 6 months ago. She noted fecal matter on the pad. In the beginning, she has liquid stool, now the stool is well formed, happens every few days but getting worse. Reports insensible stool loss but sometime have an urge to defecate.  She started seeing Dr. Schulz few months ago, had rectal manometry, has a follow up with her later today. \par \par GYN Hx: LMP: 9 years ago , no hx of postmenopausal bleeding, sexually active with female partner. Denies hx of abnormal pap smear\par \par PMHx: Migraine, DM, HTN\par \par PSHx: Lap denzel

## 2022-05-28 ENCOUNTER — NON-APPOINTMENT (OUTPATIENT)
Age: 52
End: 2022-05-28

## 2022-05-28 LAB
APPEARANCE: ABNORMAL
BACTERIA: NEGATIVE
BILIRUBIN URINE: NEGATIVE
BLOOD URINE: NEGATIVE
CALCIUM OXALATE CRYSTALS: ABNORMAL
COLOR: NORMAL
GLUCOSE QUALITATIVE U: NEGATIVE
HYALINE CASTS: 0 /LPF
KETONES URINE: NEGATIVE
LEUKOCYTE ESTERASE URINE: NEGATIVE
MICROSCOPIC-UA: NORMAL
NITRITE URINE: NEGATIVE
PH URINE: 5.5
PROTEIN URINE: NEGATIVE
RED BLOOD CELLS URINE: 1 /HPF
SPECIFIC GRAVITY URINE: 1.02
SQUAMOUS EPITHELIAL CELLS: 1 /HPF
URIC ACID CRYSTALS: ABNORMAL
UROBILINOGEN URINE: NORMAL
WHITE BLOOD CELLS URINE: 1 /HPF

## 2022-05-30 LAB — BACTERIA UR CULT: NORMAL

## 2022-05-31 ENCOUNTER — RX RENEWAL (OUTPATIENT)
Age: 52
End: 2022-05-31

## 2022-06-07 ENCOUNTER — APPOINTMENT (OUTPATIENT)
Dept: CARDIOLOGY | Facility: CLINIC | Age: 52
End: 2022-06-07

## 2022-06-25 ENCOUNTER — EMERGENCY (EMERGENCY)
Facility: HOSPITAL | Age: 52
LOS: 1 days | Discharge: HOME | End: 2022-06-25
Admitting: EMERGENCY MEDICINE
Payer: MEDICARE

## 2022-06-25 DIAGNOSIS — E11.9 TYPE 2 DIABETES MELLITUS WITHOUT COMPLICATIONS: ICD-10-CM

## 2022-06-25 DIAGNOSIS — M25.551 PAIN IN RIGHT HIP: ICD-10-CM

## 2022-06-25 DIAGNOSIS — S70.01XA CONTUSION OF RIGHT HIP, INITIAL ENCOUNTER: ICD-10-CM

## 2022-06-25 DIAGNOSIS — F41.9 ANXIETY DISORDER, UNSPECIFIED: ICD-10-CM

## 2022-06-25 DIAGNOSIS — I10 ESSENTIAL (PRIMARY) HYPERTENSION: ICD-10-CM
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
## 2022-06-25 PROCEDURE — 99283 EMERGENCY DEPT VISIT LOW MDM: CPT

## 2022-06-25 PROCEDURE — 73502 X-RAY EXAM HIP UNI 2-3 VIEWS: CPT | Mod: 26,RT

## 2022-06-29 ENCOUNTER — APPOINTMENT (OUTPATIENT)
Dept: UROGYNECOLOGY | Facility: CLINIC | Age: 52
End: 2022-06-29

## 2022-06-29 PROCEDURE — 51797 INTRAABDOMINAL PRESSURE TEST: CPT | Mod: 26

## 2022-06-29 PROCEDURE — 51729 CYSTOMETROGRAM W/VP&UP: CPT | Mod: 26

## 2022-06-29 PROCEDURE — 51784 ANAL/URINARY MUSCLE STUDY: CPT | Mod: 26

## 2022-06-29 PROCEDURE — 51741 ELECTRO-UROFLOWMETRY FIRST: CPT | Mod: 26

## 2022-07-05 ENCOUNTER — EMERGENCY (EMERGENCY)
Facility: HOSPITAL | Age: 52
LOS: 1 days | Discharge: HOME | End: 2022-07-05
Admitting: EMERGENCY MEDICINE

## 2022-07-05 DIAGNOSIS — F41.9 ANXIETY DISORDER, UNSPECIFIED: ICD-10-CM

## 2022-07-05 DIAGNOSIS — I10 ESSENTIAL (PRIMARY) HYPERTENSION: ICD-10-CM

## 2022-07-05 DIAGNOSIS — E11.65 TYPE 2 DIABETES MELLITUS WITH HYPERGLYCEMIA: ICD-10-CM

## 2022-07-05 DIAGNOSIS — R11.0 NAUSEA: ICD-10-CM

## 2022-07-05 PROCEDURE — 99283 EMERGENCY DEPT VISIT LOW MDM: CPT

## 2022-07-07 ENCOUNTER — APPOINTMENT (OUTPATIENT)
Dept: UROGYNECOLOGY | Facility: CLINIC | Age: 52
End: 2022-07-07

## 2022-07-07 VITALS
HEIGHT: 63 IN | HEART RATE: 75 BPM | BODY MASS INDEX: 39.69 KG/M2 | SYSTOLIC BLOOD PRESSURE: 147 MMHG | DIASTOLIC BLOOD PRESSURE: 83 MMHG | WEIGHT: 224 LBS

## 2022-07-07 PROCEDURE — 99213 OFFICE O/P EST LOW 20 MIN: CPT

## 2022-07-07 NOTE — HISTORY OF PRESENT ILLNESS
[FreeTextEntry1] : Briefly, patient is a 52-year-old multipara with mixed urinary incontinence (stress greater than urge), overactive bladder, nocturia, fecal incontinence and atrophic vaginitis. On office examination, there was evidence of some loss of urethral support but she had a negative empty bladder supine cough stress test, normal postvoid residual, and fair levator ani awareness/contraction. She has history of recurrent UTI. She admits to consumption of a few bladder irritants. Other potential contributing factors include class II obesity, intake of anti-hypertensive, sleep apnea and diabetes. She is interested in surgical management of stress urinary incontinence and underwent urodynamic testing on 6/29/22. Her urodynamic test findings are consistent with normal uroflow (voided volume 100 cc, max flow 19, PVR 0), somewhat increased sensation, normal cystometric capacity of 430 cc, normal compliance, evidence of stress urinary incontinence starting at filled volumes of 100 cc, absence of cystometric detrusor overactivity and normal pressure voiding study ( voided volume 430 after being filled with 400 cc, max flow 37, pressure at peak flow 50 cm of water, intermittent flow pattern, voiding mechanism appear to be detrusor contraction). \par \par

## 2022-07-07 NOTE — DISCUSSION/SUMMARY
[Visit Time ___ Minutes] : [unfilled] minutes [Face to Face Time___ Minutes] : with [unfilled] minutes in face to face consultation. [FreeTextEntry1] : I discussed the urodynamic procedure finding in detail.  I again discussed the diagnosis of overactive bladder, urgency incontinence as well as stress incontinence and explained to her that she has a combination of these conditions and treatment for one may not address the other condition. She was again counseled regarding treatment options for stress urinary incontinence including pelvic floor PT, pessary placement and surgical management with midurethral sling. AUGS interactive tool was used to explain normal anatomy as well as alteration in urethral support associated with stress urinary incontinence. Patient is scheduled for SNM in end of July for OAB and fecal incontinence. I advised 4 week interval before she proceeds with sling procedure. She is comfortable with his plan

## 2022-07-14 ENCOUNTER — OUTPATIENT (OUTPATIENT)
Dept: OUTPATIENT SERVICES | Facility: HOSPITAL | Age: 52
LOS: 1 days | End: 2022-07-14

## 2022-07-19 DIAGNOSIS — I10 ESSENTIAL (PRIMARY) HYPERTENSION: ICD-10-CM

## 2022-07-19 DIAGNOSIS — Z01.812 ENCOUNTER FOR PREPROCEDURAL LABORATORY EXAMINATION: ICD-10-CM

## 2022-07-19 DIAGNOSIS — E11.9 TYPE 2 DIABETES MELLITUS WITHOUT COMPLICATIONS: ICD-10-CM

## 2022-07-19 DIAGNOSIS — R15.9 FULL INCONTINENCE OF FECES: ICD-10-CM

## 2022-07-28 ENCOUNTER — OUTPATIENT (OUTPATIENT)
Dept: OUTPATIENT SERVICES | Facility: HOSPITAL | Age: 52
LOS: 1 days | End: 2022-07-28

## 2022-08-02 DIAGNOSIS — R32 UNSPECIFIED URINARY INCONTINENCE: ICD-10-CM

## 2022-08-02 DIAGNOSIS — I10 ESSENTIAL (PRIMARY) HYPERTENSION: ICD-10-CM

## 2022-08-02 DIAGNOSIS — F42.9 OBSESSIVE-COMPULSIVE DISORDER, UNSPECIFIED: ICD-10-CM

## 2022-08-02 DIAGNOSIS — R15.9 FULL INCONTINENCE OF FECES: ICD-10-CM

## 2022-08-02 DIAGNOSIS — F41.9 ANXIETY DISORDER, UNSPECIFIED: ICD-10-CM

## 2022-08-02 DIAGNOSIS — E11.9 TYPE 2 DIABETES MELLITUS WITHOUT COMPLICATIONS: ICD-10-CM

## 2022-08-02 DIAGNOSIS — E66.9 OBESITY, UNSPECIFIED: ICD-10-CM

## 2022-08-02 DIAGNOSIS — E78.5 HYPERLIPIDEMIA, UNSPECIFIED: ICD-10-CM

## 2022-08-02 DIAGNOSIS — F32.9 MAJOR DEPRESSIVE DISORDER, SINGLE EPISODE, UNSPECIFIED: ICD-10-CM

## 2022-08-02 DIAGNOSIS — G47.30 SLEEP APNEA, UNSPECIFIED: ICD-10-CM

## 2022-08-02 DIAGNOSIS — Z79.84 LONG TERM (CURRENT) USE OF ORAL HYPOGLYCEMIC DRUGS: ICD-10-CM

## 2022-08-16 ENCOUNTER — EMERGENCY (EMERGENCY)
Facility: HOSPITAL | Age: 52
LOS: 1 days | Discharge: HOME | End: 2022-08-16
Admitting: EMERGENCY MEDICINE

## 2022-08-16 DIAGNOSIS — H66.92 OTITIS MEDIA, UNSPECIFIED, LEFT EAR: ICD-10-CM

## 2022-08-16 DIAGNOSIS — H92.02 OTALGIA, LEFT EAR: ICD-10-CM

## 2022-08-16 PROCEDURE — 99283 EMERGENCY DEPT VISIT LOW MDM: CPT

## 2022-09-06 ENCOUNTER — EMERGENCY (EMERGENCY)
Facility: HOSPITAL | Age: 52
LOS: 1 days | Discharge: HOME | End: 2022-09-06
Admitting: EMERGENCY MEDICINE

## 2022-09-06 DIAGNOSIS — R06.00 DYSPNEA, UNSPECIFIED: ICD-10-CM

## 2022-09-06 DIAGNOSIS — R05.9 COUGH, UNSPECIFIED: ICD-10-CM

## 2022-09-06 DIAGNOSIS — I10 ESSENTIAL (PRIMARY) HYPERTENSION: ICD-10-CM

## 2022-09-06 DIAGNOSIS — E11.9 TYPE 2 DIABETES MELLITUS WITHOUT COMPLICATIONS: ICD-10-CM

## 2022-09-06 PROCEDURE — 99284 EMERGENCY DEPT VISIT MOD MDM: CPT

## 2022-09-06 PROCEDURE — 93010 ELECTROCARDIOGRAM REPORT: CPT

## 2022-09-07 PROCEDURE — 71045 X-RAY EXAM CHEST 1 VIEW: CPT | Mod: 26

## 2022-09-20 ENCOUNTER — APPOINTMENT (OUTPATIENT)
Dept: CT IMAGING | Facility: CLINIC | Age: 52
End: 2022-09-20

## 2022-09-20 PROCEDURE — 71250 CT THORAX DX C-: CPT | Mod: MH

## 2022-10-17 ENCOUNTER — APPOINTMENT (OUTPATIENT)
Dept: PULMONOLOGY | Facility: CLINIC | Age: 52
End: 2022-10-17

## 2022-10-31 ENCOUNTER — APPOINTMENT (OUTPATIENT)
Dept: PULMONOLOGY | Facility: CLINIC | Age: 52
End: 2022-10-31

## 2022-10-31 VITALS
DIASTOLIC BLOOD PRESSURE: 84 MMHG | RESPIRATION RATE: 14 BRPM | HEART RATE: 71 BPM | BODY MASS INDEX: 39.86 KG/M2 | OXYGEN SATURATION: 98 % | WEIGHT: 225 LBS | SYSTOLIC BLOOD PRESSURE: 142 MMHG

## 2022-10-31 PROCEDURE — 99215 OFFICE O/P EST HI 40 MIN: CPT

## 2022-10-31 RX ORDER — PANTOPRAZOLE 40 MG/1
40 TABLET, DELAYED RELEASE ORAL TWICE DAILY
Qty: 60 | Refills: 1 | Status: DISCONTINUED | COMMUNITY
Start: 2021-09-08 | End: 2022-10-31

## 2022-10-31 RX ORDER — SODIUM SULFATE, POTASSIUM SULFATE, MAGNESIUM SULFATE 17.5; 3.13; 1.6 G/ML; G/ML; G/ML
17.5-3.13-1.6 SOLUTION, CONCENTRATE ORAL
Qty: 354 | Refills: 0 | Status: DISCONTINUED | COMMUNITY
Start: 2022-06-13

## 2022-10-31 RX ORDER — HYDROCHLOROTHIAZIDE 25 MG/1
25 TABLET ORAL DAILY
Qty: 30 | Refills: 0 | Status: DISCONTINUED | COMMUNITY
Start: 2020-02-13 | End: 2022-10-31

## 2022-10-31 RX ORDER — TOPIRAMATE 100 MG/1
100 TABLET, FILM COATED ORAL
Qty: 90 | Refills: 0 | Status: ACTIVE | COMMUNITY
Start: 2022-05-23

## 2022-10-31 RX ORDER — DIAZEPAM 5 MG/1
5 TABLET ORAL
Qty: 6 | Refills: 0 | Status: DISCONTINUED | COMMUNITY
Start: 2022-06-25

## 2022-10-31 RX ORDER — PREDNISONE 10 MG/1
10 TABLET ORAL
Qty: 30 | Refills: 0 | Status: DISCONTINUED | COMMUNITY
Start: 2022-09-17

## 2022-10-31 RX ORDER — LEVOFLOXACIN 250 MG/1
250 TABLET, FILM COATED ORAL
Qty: 7 | Refills: 0 | Status: DISCONTINUED | COMMUNITY
Start: 2022-08-26

## 2022-10-31 RX ORDER — RIZATRIPTAN BENZOATE 10 MG/1
10 TABLET ORAL
Qty: 12 | Refills: 0 | Status: ACTIVE | COMMUNITY
Start: 2022-03-10

## 2022-10-31 RX ORDER — CIPROFLOXACIN HYDROCHLORIDE 500 MG/1
500 TABLET, FILM COATED ORAL
Qty: 14 | Refills: 0 | Status: DISCONTINUED | COMMUNITY
Start: 2022-10-03

## 2022-10-31 RX ORDER — LOSARTAN POTASSIUM 100 MG/1
100 TABLET, FILM COATED ORAL DAILY
Qty: 60 | Refills: 1 | Status: DISCONTINUED | COMMUNITY
Start: 2020-02-13 | End: 2022-10-31

## 2022-10-31 RX ORDER — LEVOFLOXACIN 500 MG/1
500 TABLET, FILM COATED ORAL
Qty: 7 | Refills: 0 | Status: DISCONTINUED | COMMUNITY
Start: 2022-09-01

## 2022-10-31 NOTE — HISTORY OF PRESENT ILLNESS
[Never] : never [Obstructive Sleep Apnea] : obstructive sleep apnea [Home] : home [APAP:] : APAP [Full Face mask] : full face mask [TextBox_4] : New onset CKD\par Status post COVID 2 years ago [TextBox_77] : 12am [TextBox_79] : 8am [TextBox_81] : 30 [TextBox_89] : 2 [TextBox_100] : 5/22/20 [TextBox_108] : 54.8 [TextBox_125] : 4-16 [TextBox_127] : 9/14/2022 [TextBox_129] : 10/13/2022 [TextBox_133] : 80 [TextBox_137] : 80 [TextBox_141] : 4 [TextBox_143] : 45 [TextBox_147] : 1.7 [TextBox_158] : Yemi [TextBox_160] : F35 [TextBox_162] : 6/8/20 [TextBox_164] : 6/8/25 [ESS] : 12

## 2022-10-31 NOTE — END OF VISIT
[FreeTextEntry3] : PACS review [Time Spent: ___ minutes] : I have spent [unfilled] minutes of time on the encounter.

## 2022-10-31 NOTE — CONSULT LETTER
[Dear  ___] : Dear  [unfilled], [Consult Letter:] : I had the pleasure of evaluating your patient, [unfilled]. [Please see my note below.] : Please see my note below. [Consult Closing:] : Thank you very much for allowing me to participate in the care of this patient.  If you have any questions, please do not hesitate to contact me. [Sincerely,] : Sincerely, [FreeTextEntry3] : Bladimir Mckinnon MD FCCP\par Pulmonary/Critical Care/Sleep Medicine\par Department of Internal Medicine\par \par [facility] [employer]\par

## 2022-10-31 NOTE — DISCUSSION/SUMMARY
[Obstructive Sleep Apnea] : obstructive sleep apnea [Severe] : severe [Responding to Treatment] : responding to treatment [Alcohol Avoidance] : alcohol avoidance [Sedative Avoidance] : sedative avoidance [Weight Loss Program] : weight loss program [CPAP] : CPAP [FreeTextEntry1] : Findings on CAT scan consistent with COVID scarring.  No further work-up necessary. [de-identified] : Patient compliant with CPAP/BiPAP and benefiting from therapy

## 2022-11-15 ENCOUNTER — APPOINTMENT (OUTPATIENT)
Dept: NEPHROLOGY | Facility: CLINIC | Age: 52
End: 2022-11-15

## 2022-11-16 ENCOUNTER — APPOINTMENT (OUTPATIENT)
Dept: NEPHROLOGY | Facility: CLINIC | Age: 52
End: 2022-11-16

## 2022-11-16 ENCOUNTER — NON-APPOINTMENT (OUTPATIENT)
Age: 52
End: 2022-11-16

## 2022-11-16 VITALS
WEIGHT: 224 LBS | SYSTOLIC BLOOD PRESSURE: 140 MMHG | HEART RATE: 87 BPM | BODY MASS INDEX: 39.69 KG/M2 | RESPIRATION RATE: 16 BRPM | HEIGHT: 63 IN | DIASTOLIC BLOOD PRESSURE: 100 MMHG | OXYGEN SATURATION: 99 % | TEMPERATURE: 97.6 F

## 2022-11-16 PROCEDURE — 99496 TRANSJ CARE MGMT HIGH F2F 7D: CPT

## 2022-11-16 RX ORDER — METOPROLOL SUCCINATE 25 MG/1
25 TABLET, EXTENDED RELEASE ORAL DAILY
Qty: 90 | Refills: 1 | Status: DISCONTINUED | COMMUNITY
Start: 2021-01-04 | End: 2022-11-16

## 2022-11-16 RX ORDER — ALBUTEROL SULFATE 108 UG/1
108 (90 BASE) AEROSOL, METERED RESPIRATORY (INHALATION) EVERY 6 HOURS
Refills: 0 | Status: DISCONTINUED | COMMUNITY
Start: 2020-02-13 | End: 2022-11-16

## 2022-11-16 RX ORDER — GABAPENTIN 300 MG/1
300 CAPSULE ORAL DAILY
Refills: 0 | Status: DISCONTINUED | COMMUNITY
End: 2022-11-16

## 2022-11-16 RX ORDER — FLUTICASONE PROPIONATE 50 UG/1
50 SPRAY, METERED NASAL
Qty: 16 | Refills: 0 | Status: DISCONTINUED | COMMUNITY
Start: 2021-03-05 | End: 2022-11-16

## 2022-11-16 RX ORDER — OMEPRAZOLE 40 MG/1
40 CAPSULE, DELAYED RELEASE ORAL
Qty: 180 | Refills: 0 | Status: DISCONTINUED | COMMUNITY
Start: 2022-06-13 | End: 2022-11-16

## 2022-11-16 RX ORDER — GABAPENTIN 400 MG/1
400 CAPSULE ORAL
Qty: 60 | Refills: 0 | Status: DISCONTINUED | COMMUNITY
Start: 2022-05-02 | End: 2022-11-16

## 2022-11-16 RX ORDER — NYSTATIN 100000 [USP'U]/G
100000 CREAM TOPICAL
Qty: 15 | Refills: 0 | Status: DISCONTINUED | COMMUNITY
Start: 2022-06-27 | End: 2022-11-16

## 2022-11-17 LAB
ALBUMIN SERPL ELPH-MCNC: 4.6 G/DL
ANION GAP SERPL CALC-SCNC: 16 MMOL/L
APPEARANCE: CLEAR
BACTERIA: NEGATIVE
BASOPHILS # BLD AUTO: 0.08 K/UL
BASOPHILS NFR BLD AUTO: 1.1 %
BILIRUBIN URINE: NEGATIVE
BLOOD URINE: NEGATIVE
BUN SERPL-MCNC: 18 MG/DL
CALCIUM SERPL-MCNC: 9.9 MG/DL
CHLORIDE SERPL-SCNC: 108 MMOL/L
CO2 SERPL-SCNC: 20 MMOL/L
COLOR: YELLOW
CREAT SERPL-MCNC: 1.21 MG/DL
CREAT SPEC-SCNC: 152 MG/DL
CREAT/PROT UR: 0.1 RATIO
DEPRECATED KAPPA LC FREE/LAMBDA SER: 0.86 RATIO
EGFR: 53 ML/MIN/1.73M2
EOSINOPHIL # BLD AUTO: 0.29 K/UL
EOSINOPHIL NFR BLD AUTO: 4.1 %
ESTIMATED AVERAGE GLUCOSE: 146 MG/DL
GLUCOSE QUALITATIVE U: NEGATIVE
GLUCOSE SERPL-MCNC: 129 MG/DL
HBA1C MFR BLD HPLC: 6.7 %
HCT VFR BLD CALC: 37.9 %
HGB BLD-MCNC: 12.7 G/DL
HYALINE CASTS: 0 /LPF
IMM GRANULOCYTES NFR BLD AUTO: 0.3 %
KAPPA LC CSF-MCNC: 3.01 MG/DL
KAPPA LC SERPL-MCNC: 2.59 MG/DL
KETONES URINE: NEGATIVE
LEUKOCYTE ESTERASE URINE: NEGATIVE
LYMPHOCYTES # BLD AUTO: 1.95 K/UL
LYMPHOCYTES NFR BLD AUTO: 27.3 %
MAN DIFF?: NORMAL
MCHC RBC-ENTMCNC: 29.1 PG
MCHC RBC-ENTMCNC: 33.5 GM/DL
MCV RBC AUTO: 86.7 FL
MICROSCOPIC-UA: NORMAL
MONOCYTES # BLD AUTO: 0.58 K/UL
MONOCYTES NFR BLD AUTO: 8.1 %
NEUTROPHILS # BLD AUTO: 4.23 K/UL
NEUTROPHILS NFR BLD AUTO: 59.1 %
NITRITE URINE: NEGATIVE
PH URINE: 5.5
PHOSPHATE SERPL-MCNC: 4.1 MG/DL
PLATELET # BLD AUTO: 223 K/UL
POTASSIUM SERPL-SCNC: 3.8 MMOL/L
PROT UR-MCNC: 16 MG/DL
PROTEIN URINE: NORMAL
RBC # BLD: 4.37 M/UL
RBC # FLD: 13.7 %
RED BLOOD CELLS URINE: 1 /HPF
SODIUM SERPL-SCNC: 143 MMOL/L
SPECIFIC GRAVITY URINE: 1.02
SQUAMOUS EPITHELIAL CELLS: 2 /HPF
UROBILINOGEN URINE: NORMAL
WBC # FLD AUTO: 7.15 K/UL
WHITE BLOOD CELLS URINE: 2 /HPF

## 2022-12-22 LAB — M PROTEIN SPEC IFE-MCNC: NORMAL

## 2023-01-12 ENCOUNTER — APPOINTMENT (OUTPATIENT)
Dept: NEPHROLOGY | Facility: CLINIC | Age: 53
End: 2023-01-12
Payer: MEDICARE

## 2023-01-12 VITALS
WEIGHT: 216 LBS | HEIGHT: 63 IN | BODY MASS INDEX: 38.27 KG/M2 | OXYGEN SATURATION: 98 % | DIASTOLIC BLOOD PRESSURE: 86 MMHG | TEMPERATURE: 98.2 F | RESPIRATION RATE: 16 BRPM | SYSTOLIC BLOOD PRESSURE: 118 MMHG | HEART RATE: 76 BPM

## 2023-01-12 PROCEDURE — 99215 OFFICE O/P EST HI 40 MIN: CPT

## 2023-01-12 RX ORDER — GUAIFENESIN AND CODEINE PHOSPHATE 10; 100 MG/5ML; MG/5ML
100-10 SOLUTION ORAL
Qty: 200 | Refills: 0 | Status: DISCONTINUED | COMMUNITY
Start: 2022-09-17 | End: 2023-01-12

## 2023-01-12 RX ORDER — LEVOTHYROXINE SODIUM 0.14 MG/1
137 TABLET ORAL
Refills: 0 | Status: DISCONTINUED | COMMUNITY
Start: 2020-02-13 | End: 2023-01-12

## 2023-01-12 RX ORDER — ONDANSETRON 4 MG/1
4 TABLET, ORALLY DISINTEGRATING ORAL
Qty: 30 | Refills: 0 | Status: DISCONTINUED | COMMUNITY
Start: 2022-05-16 | End: 2023-01-12

## 2023-01-12 RX ORDER — IBUPROFEN 600 MG/1
600 TABLET, FILM COATED ORAL
Qty: 12 | Refills: 0 | Status: DISCONTINUED | COMMUNITY
Start: 2022-06-25 | End: 2023-01-12

## 2023-01-12 RX ORDER — DICYCLOMINE HYDROCHLORIDE 20 MG/1
20 TABLET ORAL
Qty: 120 | Refills: 0 | Status: DISCONTINUED | COMMUNITY
Start: 2022-10-04 | End: 2023-01-12

## 2023-01-12 RX ORDER — OMEGA-3/DHA/EPA/FISH OIL 300-1000MG
CAPSULE ORAL
Refills: 0 | Status: DISCONTINUED | COMMUNITY
End: 2023-01-12

## 2023-01-12 RX ORDER — CLOTRIMAZOLE 10 MG/G
1 CREAM TOPICAL
Qty: 60 | Refills: 0 | Status: DISCONTINUED | COMMUNITY
Start: 2022-05-09 | End: 2023-01-12

## 2023-01-12 NOTE — HISTORY OF PRESENT ILLNESS
[FreeTextEntry1] : Patient is a 52 year old female with history of HTN, DM for 20+ years, obesity, microalbuminuria; here for transitional visit within 7 days of discharge; called by me within 24 hours; was at [facility] 11/11/22-11/12/22; found to have elevated SCr of 2.0 downtrended to 1.4 after hydration due to N/V/D; likely DMN. No complaints at current.

## 2023-01-12 NOTE — PHYSICAL EXAM
[General Appearance - Alert] : alert [General Appearance - In No Acute Distress] : in no acute distress [Sclera] : the sclera and conjunctiva were normal [PERRL With Normal Accommodation] : pupils were equal in size, round, and reactive to light [Extraocular Movements] : extraocular movements were intact [Outer Ear] : the ears and nose were normal in appearance [Oropharynx] : the oropharynx was normal [Neck Appearance] : the appearance of the neck was normal [Neck Cervical Mass (___cm)] : no neck mass was observed [Jugular Venous Distention Increased] : there was no jugular-venous distention [Thyroid Diffuse Enlargement] : the thyroid was not enlarged [Thyroid Nodule] : there were no palpable thyroid nodules [Auscultation Breath Sounds / Voice Sounds] : lungs were clear to auscultation bilaterally [Heart Rate And Rhythm] : heart rate was normal and rhythm regular [Heart Sounds] : normal S1 and S2 [Heart Sounds Gallop] : no gallops [Murmurs] : no murmurs [Heart Sounds Pericardial Friction Rub] : no pericardial rub [Cervical Lymph Nodes Enlarged Posterior Bilaterally] : posterior cervical [Cervical Lymph Nodes Enlarged Anterior Bilaterally] : anterior cervical [Supraclavicular Lymph Nodes Enlarged Bilaterally] : supraclavicular [Axillary Lymph Nodes Enlarged Bilaterally] : axillary [Femoral Lymph Nodes Enlarged Bilaterally] : femoral [Inguinal Lymph Nodes Enlarged Bilaterally] : inguinal [No CVA Tenderness] : no ~M costovertebral angle tenderness [No Spinal Tenderness] : no spinal tenderness [Abnormal Walk] : normal gait [Nail Clubbing] : no clubbing  or cyanosis of the fingernails [Musculoskeletal - Swelling] : no joint swelling seen [Motor Tone] : muscle strength and tone were normal [Skin Color & Pigmentation] : normal skin color and pigmentation [Skin Turgor] : normal skin turgor [] : no rash [Deep Tendon Reflexes (DTR)] : deep tendon reflexes were 2+ and symmetric [Sensation] : the sensory exam was normal to light touch and pinprick [No Focal Deficits] : no focal deficits [Oriented To Time, Place, And Person] : oriented to person, place, and time [Impaired Insight] : insight and judgment were intact [Affect] : the affect was normal

## 2023-01-12 NOTE — ASSESSMENT
[FreeTextEntry1] : 1) CKD III\par 2) HTN\par 3) DM\par 4) Microalbuminuria\par \par Stop all NSAIDs\par Labs reviewed; Cr down to 1.2\par Proteinuria minimal\par Will hold off on ACEI or ARB or SGLT2 inhibitor\par Have discussed weight loss and exercise regimen with patient in detail\par Weighs 216 at home; goal for 206 in 4 months\par \par RTC 4 months

## 2023-02-06 ENCOUNTER — NON-APPOINTMENT (OUTPATIENT)
Age: 53
End: 2023-02-06

## 2023-02-06 ENCOUNTER — APPOINTMENT (OUTPATIENT)
Dept: OPHTHALMOLOGY | Facility: CLINIC | Age: 53
End: 2023-02-06
Payer: MEDICARE

## 2023-02-06 PROCEDURE — 92014 COMPRE OPH EXAM EST PT 1/>: CPT

## 2023-03-06 ENCOUNTER — EMERGENCY (EMERGENCY)
Facility: HOSPITAL | Age: 53
LOS: 1 days | Discharge: HOME | End: 2023-03-06
Attending: STUDENT IN AN ORGANIZED HEALTH CARE EDUCATION/TRAINING PROGRAM
Payer: MEDICARE

## 2023-03-06 VITALS
HEIGHT: 64 IN | TEMPERATURE: 97 F | WEIGHT: 240.08 LBS | RESPIRATION RATE: 18 BRPM | OXYGEN SATURATION: 98 % | DIASTOLIC BLOOD PRESSURE: 80 MMHG | HEART RATE: 84 BPM | SYSTOLIC BLOOD PRESSURE: 154 MMHG

## 2023-03-06 PROCEDURE — 74019 RADEX ABDOMEN 2 VIEWS: CPT | Mod: 26

## 2023-03-06 PROCEDURE — 99283 EMERGENCY DEPT VISIT LOW MDM: CPT

## 2023-03-06 PROCEDURE — 99284 EMERGENCY DEPT VISIT MOD MDM: CPT | Mod: FS

## 2023-03-06 PROCEDURE — 74019 RADEX ABDOMEN 2 VIEWS: CPT

## 2023-03-06 RX ORDER — POLYETHYLENE GLYCOL 3350 17 G/17G
17 POWDER, FOR SOLUTION ORAL
Qty: 119 | Refills: 0
Start: 2023-03-06 | End: 2023-03-12

## 2023-03-06 RX ORDER — ACETAMINOPHEN 500 MG
650 TABLET ORAL ONCE
Refills: 0 | Status: COMPLETED | OUTPATIENT
Start: 2023-03-06 | End: 2023-03-06

## 2023-03-06 RX ADMIN — Medication 650 MILLIGRAM(S): at 05:06

## 2023-03-06 RX ADMIN — Medication 30 MILLILITER(S): at 05:06

## 2023-03-06 NOTE — ED PROVIDER NOTE - CLINICAL SUMMARY MEDICAL DECISION MAKING FREE TEXT BOX
53F presenting with lower abdominal pain, same as last week when she was diagnosed with constipation on CT AP.   On exam, abdomen soft, no ttp. Appears comfortable.   Meds and XR abdomen.   Rx miralax and to FU with GI. 53F presenting with lower abdominal pain, same as last week when she was diagnosed with constipation on CT AP.   On exam, abdomen soft, no ttp. Appears comfortable. Pt ran out of miralax and pain returned.   Meds and XR abdomen.   Rx miralax and to FU with GI.

## 2023-03-06 NOTE — ED ADULT TRIAGE NOTE - CHIEF COMPLAINT QUOTE
c/o of lower abd pain since 8 pm last night. Reports feels bloated. Pt went to [facility] last week and had a CT done and showed stool in the bowel. Pt was given laxatives and had some BM since then. c/o of nausea.

## 2023-03-06 NOTE — ED PROVIDER NOTE - NSFOLLOWUPINSTRUCTIONS_ED_ALL_ED_FT
Continue daily miralax.   Follow up with GI doctor within 1 week.   Return for any worsening symptoms.     Constipation    Constipation is when a person has fewer than three bowel movements a week, has difficulty having a bowel movement, or has stools that are dry, hard, or larger than normal. Other symptoms can include abdominal pain or bloating. As people grow older, constipation is more common. A low-fiber diet, not taking in enough fluids, and taking certain medicines, including opioid painkillers, may make constipation worse. Treatment varies but may include dietary modifications (more fiber-rich foods), lifestyle modifications, and possible medications.     SEEK IMMEDIATE MEDICAL CARE IF YOU HAVE ANY OF THE FOLLOWING SYMPTOMS: bright red blood in your stool, constipation for longer than 4 days, abdominal or rectal pain, unexplained weight loss, or inability to pass gas.

## 2023-03-06 NOTE — ED ADULT NURSE NOTE - CHIEF COMPLAINT QUOTE
c/o of lower abd pain since 8 pm last night. Reports feels bloated. Pt went to [facility] last week and had a CT done and showed stool in the bowel. Pt was given laxatives and had some BM since then. c/o of nausea.

## 2023-03-06 NOTE — ED PROVIDER NOTE - PATIENT PORTAL LINK FT
You can access the FollowMyHealth Patient Portal offered by [facility] by registering at the following website: [email or web address]. By joining [employer]’s FollowMyHealth portal, you will also be able to view your health information using other applications (apps) compatible with our system.

## 2023-03-06 NOTE — ED PROVIDER NOTE - ATTENDING APP SHARED VISIT CONTRIBUTION OF CARE
53y F w/ hx DM, HTN, hypothyroid, cholecystectomy, presents for constipation. Pt reports persistent abdominal discomfort over the past week. [place] [facility] [place] and had CT done that showed constipation. Was treated with Miralax with some improvement. Has since been taking Dulcolax at home but says the symptoms have persisted. No nausea, vomiting, fever. Says she is passing flatus. On exam, pt well appearing and in no acute distress. Abdomen soft and nontender throughout. AXR showing nonobstructive bowel gas pattern. Treated with Tylenol and Maalox. Pt advised to continue taking Miralax at home. Stable for discharge with GI f/u and return precautions.

## 2023-03-06 NOTE — ED PROVIDER NOTE - CARE PROVIDER_API CALL
Rosetta Meier (DO)  Gastroenterology  [address], [address]  [place] [address]  Phone: [phone]  Fax: [phone]  Follow Up Time:

## 2023-03-06 NOTE — ED PROVIDER NOTE - OBJECTIVE STATEMENT
53F with pmh DM, hypothyroid, HTN, anxiety presenting with lower abdominal pain x 1 week that was worsening tonight. Pt was seen at [facility] ED 1 week ago and had labs and CT showing constipation. She was given miralax with some improvement but pt ran out of Miralax and same pain returned.  Denies cp, sob, n/v/diarrhea.

## 2023-03-06 NOTE — ED PROVIDER NOTE - NS ED ATTENDING STATEMENT MOD
This was a shared visit with the RAAD. I reviewed and verified the documentation and independently performed the documented:

## 2023-03-06 NOTE — ED ADULT NURSE NOTE - OBJECTIVE STATEMENT
PT is A&Ox4, PT reports feeling abdominal pain for the past week worsening last night at 2000, PT rates pain 7 (1-10) centralized in the lower abdomen with out radiation described as sharp/dull. PT +Nausea with out vomiting or dizziness. PT reports no diarrhea or changes in bowel habits. PT denies any chest pain or shortness of breath, resting comfortably in ED exam chair.

## 2023-03-09 ENCOUNTER — APPOINTMENT (OUTPATIENT)
Dept: THORACIC SURGERY | Facility: CLINIC | Age: 53
End: 2023-03-09
Payer: MEDICARE

## 2023-03-09 VITALS
WEIGHT: 215 LBS | RESPIRATION RATE: 16 BRPM | OXYGEN SATURATION: 98 % | DIASTOLIC BLOOD PRESSURE: 87 MMHG | HEIGHT: 63 IN | SYSTOLIC BLOOD PRESSURE: 138 MMHG | HEART RATE: 85 BPM | BODY MASS INDEX: 38.09 KG/M2

## 2023-03-09 PROCEDURE — 99205 OFFICE O/P NEW HI 60 MIN: CPT

## 2023-03-09 NOTE — ASSESSMENT
[FreeTextEntry1] : At today's visit we discussed her symptoms as it relates to hiatal hernia, GERD, and Lawrence Esophagus as well as Esophageal Cancer. She has overwhelming symptoms of chronic GERD. Review of pH monitoring also verifies this. The anatomy and physiology of sliding hiatal hernia was also reviewed. The procedure that I am recommending involves a robotic assist repair of the hiatus, with a fundoplication to prevent reoccurrence. Prior to any intervention I would like for her to obtain motility testing with BRAVO study. She will obtain testing and return to care for review of testing and surgical plan of care. \par \par PLAN:\par - BRAVO manometry testing\par - REturn to care after imaging obtained\par \par \par \par \par \par \par I, Dr. Lr, personally performed the evaluation and management (E/M) services for this new patient.  That E/M includes conducting the initial examination, assessing all conditions, and establishing the plan of care.  Today, my ACP, Solitario Barker NP was here to observe my evaluation and management services for this patient to be followed going forward.\par \par \par

## 2023-03-09 NOTE — DATA REVIEWED
[FreeTextEntry1] : CT Scan of the chest from 09/20/22 [facility] \par - Stable mild GGO in the medial right lower lobe are unchanged since prior CT in October 2021

## 2023-03-09 NOTE — PHYSICAL EXAM
[General Appearance - Well Nourished] : well nourished [General Appearance - Well Developed] : well developed [Sclera] : the sclera and conjunctiva were normal [Outer Ear] : the ears and nose were normal in appearance [Neck Appearance] : the appearance of the neck was normal [Respiration, Rhythm And Depth] : normal respiratory rhythm and effort [Heart Rate And Rhythm] : heart rate was normal and rhythm regular [Examination Of The Chest] : the chest was normal in appearance [Abnormal Walk] : normal gait [Skin Color & Pigmentation] : normal skin color and pigmentation [Sensation] : the sensory exam was normal to light touch and pinprick

## 2023-03-09 NOTE — REVIEW OF SYSTEMS
[Feeling Poorly] : feeling poorly [Feeling Tired] : feeling tired [Chest Pain] : no chest pain [Palpitations] : no palpitations [Lower Ext Edema] : no lower extremity edema [Shortness Of Breath] : no shortness of breath [Cough] : cough [SOB on Exertion] : shortness of breath during exertion [Abdominal Pain] : abdominal pain [Vomiting] : vomiting [Constipation] : constipation [Diarrhea] : no diarrhea [Heartburn] : heartburn [Melena] : no melena [Negative] : Heme/Lymph

## 2023-03-09 NOTE — HISTORY OF PRESENT ILLNESS
[FreeTextEntry1] : Ms. MORA is a 53 year old female referred by Dr. Rose who presents for consultation. Her past medical history includes HTN, HLD, diabetes, depression, severe LORA (CPAP), and dyspnea. \par \par She has been on PPI therapy for many years and remains with reflux. She coughs and dry heevs daily for years and feels it is part of her reflux. She has attempted low acid diet and has had no positive relief. She notices that over the past year she has also had a change in her voice. Recent Endoscopy and pH monitoring was performed positive for GERD.\par \par \par

## 2023-03-24 ENCOUNTER — APPOINTMENT (OUTPATIENT)
Dept: OBGYN | Facility: CLINIC | Age: 53
End: 2023-03-24
Payer: MEDICARE

## 2023-03-24 VITALS
DIASTOLIC BLOOD PRESSURE: 98 MMHG | WEIGHT: 219 LBS | SYSTOLIC BLOOD PRESSURE: 148 MMHG | HEIGHT: 63 IN | BODY MASS INDEX: 38.8 KG/M2

## 2023-03-24 PROCEDURE — 99212 OFFICE O/P EST SF 10 MIN: CPT | Mod: 25

## 2023-03-24 PROCEDURE — G0101: CPT

## 2023-03-24 PROCEDURE — 99396 PREV VISIT EST AGE 40-64: CPT | Mod: GY

## 2023-03-24 RX ORDER — IRBESARTAN 300 MG/1
TABLET ORAL
Refills: 0 | Status: ACTIVE | COMMUNITY

## 2023-03-24 RX ORDER — BENZONATATE 100 MG/1
100 CAPSULE ORAL
Qty: 9 | Refills: 0 | Status: COMPLETED | COMMUNITY
Start: 2022-09-07 | End: 2023-03-24

## 2023-03-24 RX ORDER — TRAMADOL HYDROCHLORIDE 50 MG/1
50 TABLET, COATED ORAL
Qty: 6 | Refills: 0 | Status: COMPLETED | COMMUNITY
Start: 2022-07-28 | End: 2023-03-24

## 2023-03-24 NOTE — DISCUSSION/SUMMARY
[FreeTextEntry1] : 54yo G0 PMF with lower pelvic abdo pain. CT scan on 2/25 demonstrates "no masses" in pelvis. Only finding is moderate fecal load. \par \par Abdo pain: ? DM related. Per pt neg colonoscopy within last 6 months \par - TVUS and MD visit \par - Pt to keep pain diary and bring it with her to her next visit \par - follow up with GI MD as planned \par \par RHM: \par - DVS neg x 3\par - Dentist, PCP, Derm as discussed \par - Rx given for DEXA, mammo/sono\par - Colonoscopy as discussed -- due per GI \par - Offered STI screen today. Pt declined\par - Encouraged healthy diet, exercise, weight loss \par \par Felicia Chavez MD \par Obstetrician/Gynecologist\par \par \par Felicia Chavez MD \par Obstetrician/Gynecologist\par

## 2023-03-24 NOTE — COUNSELING
[Intimate Partner Violence] : intimate partner violence [STD (testing, results, tx)] : STD (testing, results, tx)

## 2023-03-24 NOTE — PHYSICAL EXAM
[Appropriately responsive] : appropriately responsive [Alert] : alert [No Acute Distress] : no acute distress [No Lymphadenopathy] : no lymphadenopathy [Regular Rate Rhythm] : regular rate rhythm [No Murmurs] : no murmurs [Clear to Auscultation B/L] : clear to auscultation bilaterally [Soft] : soft [Non-tender] : non-tender [Non-distended] : non-distended [No HSM] : No HSM [No Lesions] : no lesions [No Mass] : no mass [Oriented x3] : oriented x3 [FreeTextEntry7] : S/NT/ND. no R/G/R. Pt has pain is in lower abdo all across.  [Examination Of The Breasts] : a normal appearance [No Masses] : no breast masses were palpable [Labia Majora] : normal [Labia Minora] : normal [Normal] : normal [Uterine Adnexae] : normal

## 2023-03-24 NOTE — HISTORY OF PRESENT ILLNESS
[Patient reported mammogram was normal] : Patient reported mammogram was normal [Patient reported breast sonogram was normal] : Patient reported breast sonogram was normal [Patient reported PAP Smear was normal] : Patient reported PAP Smear was normal [Patient reported bone density results were normal] : Patient reported bone density results were normal [Patient reported colonoscopy was normal] : Patient reported colonoscopy was normal [FreeTextEntry1] : 54yo G0 PMF is here for annual and to discuss abdo pain. She reports having pain x 1 month. Soreness in lower abdomen like when she had her menses. Made worse with nervousness. Associated with nausea and 2 episodes of vomiting. Nothing makes it better. Went to ED and was told it was constipation and took MiraLAX which didn't really help. She saw her GI who ordered a abdominal Doppler which she will have next week. She has gastroparesis.  [Mammogramdate] : 4/2021 [BreastSonogramDate] : 4/2021 [PapSmeardate] : 3/2021  [BoneDensityDate] : 2021 [ColonoscopyDate] : within the last 6 months

## 2023-03-27 ENCOUNTER — APPOINTMENT (OUTPATIENT)
Dept: CARDIOLOGY | Facility: CLINIC | Age: 53
End: 2023-03-27
Payer: MEDICARE

## 2023-03-27 ENCOUNTER — NON-APPOINTMENT (OUTPATIENT)
Age: 53
End: 2023-03-27

## 2023-03-27 VITALS
BODY MASS INDEX: 39.87 KG/M2 | RESPIRATION RATE: 14 BRPM | SYSTOLIC BLOOD PRESSURE: 132 MMHG | HEIGHT: 63 IN | HEART RATE: 84 BPM | OXYGEN SATURATION: 100 % | TEMPERATURE: 97 F | WEIGHT: 225 LBS | DIASTOLIC BLOOD PRESSURE: 70 MMHG

## 2023-03-27 DIAGNOSIS — F42.9 OBSESSIVE-COMPULSIVE DISORDER, UNSPECIFIED: ICD-10-CM

## 2023-03-27 PROCEDURE — 93000 ELECTROCARDIOGRAM COMPLETE: CPT

## 2023-03-27 PROCEDURE — 99214 OFFICE O/P EST MOD 30 MIN: CPT

## 2023-03-27 NOTE — ASSESSMENT
[FreeTextEntry1] : EKG 3/27/2023: Sinus rhythm.  Poor R wave progression.  No significant ST or T abnormality.\par \par History of abnormal ST segment response to stress test in the past.  For evaluation prior to her hiatus hernia repair surgery and fundoplication under general anesthesia, a stress echocardiogram would be helpful.  As long as she can do 5-6 METS without cardiac symptoms or ischemia on stress echo, patient would be low risk for major cardiovascular events from the anticipated surgery.\par \par In the long-term:\par \par Weight reduction would be very helpful.  Dietary changes discussed\par Continue current medications\par Reduction of lipids.  Use statins.  The patient is not sure why it was stopped.  She feels it was an oversight.\par Exercise program.  Perhaps supervised.\par Check LDL and LFT 1 month after statin therapy.  Lab slip given.\par \par Thank you for this referral and allowing me to participate in the care of this patient.  If I can be of any further help or  if you have any questions, please do not hesitate to contact me\par \par \par Sincerely,\par \par Gregorio Stephenson MD, FACC, VENICE\par

## 2023-03-27 NOTE — PHYSICAL EXAM
[Normal] : clear lung fields, good air entry, no respiratory distress [Soft] : abdomen soft [de-identified] : Obesity

## 2023-03-27 NOTE — HISTORY OF PRESENT ILLNESS
[FreeTextEntry1] : Angelica is a 53-year-old female with hypertension, obesity, diabetes, GERD.  \par \par Abnormal stress test: Invasive coronary angiography revealed normal coronary arteries.\par \par Hypertension: Well-controlled.\par \par Obesity, sleep apnea, on CPAP mask.\par \par She is able to walk up 3 flight of steps without much difficulty.  Also walk briskly.  She feels her limitation is secondary to obesity.  No PND, orthopnea, pedal edema.  No history of cardiovascular events in the past year.\par \par Patient with history of GERD.  She is scheduled for hernia repair and fundoplication\par

## 2023-03-28 LAB
APPEARANCE: CLEAR
BACTERIA UR CULT: NORMAL
BILIRUBIN URINE: NEGATIVE
BLOOD URINE: NEGATIVE
COLOR: NORMAL
GLUCOSE QUALITATIVE U: NEGATIVE
HPV HIGH+LOW RISK DNA PNL CVX: NOT DETECTED
KETONES URINE: NEGATIVE
LEUKOCYTE ESTERASE URINE: NEGATIVE
NITRITE URINE: NEGATIVE
PH URINE: 6
PROTEIN URINE: NORMAL
SPECIFIC GRAVITY URINE: 1.02
UROBILINOGEN URINE: NORMAL

## 2023-03-29 ENCOUNTER — APPOINTMENT (OUTPATIENT)
Dept: ANTEPARTUM | Facility: CLINIC | Age: 53
End: 2023-03-29
Payer: MEDICARE

## 2023-03-29 ENCOUNTER — ASOB RESULT (OUTPATIENT)
Age: 53
End: 2023-03-29

## 2023-03-29 PROCEDURE — 76856 US EXAM PELVIC COMPLETE: CPT | Mod: 59

## 2023-03-29 PROCEDURE — 76830 TRANSVAGINAL US NON-OB: CPT

## 2023-03-30 ENCOUNTER — APPOINTMENT (OUTPATIENT)
Dept: CARDIOLOGY | Facility: CLINIC | Age: 53
End: 2023-03-30
Payer: MEDICARE

## 2023-03-30 LAB — CYTOLOGY CVX/VAG DOC THIN PREP: ABNORMAL

## 2023-03-30 PROCEDURE — 76376 3D RENDER W/INTRP POSTPROCES: CPT

## 2023-03-30 PROCEDURE — 93306 TTE W/DOPPLER COMPLETE: CPT

## 2023-04-03 ENCOUNTER — APPOINTMENT (OUTPATIENT)
Dept: OBGYN | Facility: CLINIC | Age: 53
End: 2023-04-03
Payer: MEDICARE

## 2023-04-03 ENCOUNTER — APPOINTMENT (OUTPATIENT)
Dept: CARDIOLOGY | Facility: CLINIC | Age: 53
End: 2023-04-03
Payer: MEDICARE

## 2023-04-03 VITALS
BODY MASS INDEX: 38.45 KG/M2 | WEIGHT: 217 LBS | HEIGHT: 63 IN | DIASTOLIC BLOOD PRESSURE: 85 MMHG | SYSTOLIC BLOOD PRESSURE: 128 MMHG

## 2023-04-03 LAB — URINE CYTOLOGY: NORMAL

## 2023-04-03 PROCEDURE — 99212 OFFICE O/P EST SF 10 MIN: CPT

## 2023-04-03 PROCEDURE — 93351 STRESS TTE COMPLETE: CPT

## 2023-04-03 PROCEDURE — 93320 DOPPLER ECHO COMPLETE: CPT

## 2023-04-03 NOTE — HISTORY OF PRESENT ILLNESS
[FreeTextEntry1] : 52yo G0 PMF is here for results review. Her had a neg pap and HPV. Her TVUS is wnl. 4mm EMS, neither ovary visualized 2/2 bowel activity. \par \par She reports having pain this weekend with nausea and vomiting. She has pain now but it is improved. She also had diarrhea however it was after she took a Miralax for constipation. SHe has an upcoming appt with GI.

## 2023-04-03 NOTE — DISCUSSION/SUMMARY
[FreeTextEntry1] : RTO in 3 months for follow up of GI results and to make sure nothing else needs to be done from GYN perspective \par \par Follow up cyto results.  Sees Dr Head for KANIKA sxs. Will refer back to her if testing persistently abnl. \par \par Felicia Chavez MD \par Obstetrician/Gynecologist\par

## 2023-04-04 ENCOUNTER — OUTPATIENT (OUTPATIENT)
Dept: OUTPATIENT SERVICES | Facility: HOSPITAL | Age: 53
LOS: 1 days | End: 2023-04-04
Payer: MEDICARE

## 2023-04-04 VITALS
RESPIRATION RATE: 16 BRPM | WEIGHT: 222.67 LBS | TEMPERATURE: 97 F | OXYGEN SATURATION: 96 % | HEIGHT: 64 IN | DIASTOLIC BLOOD PRESSURE: 80 MMHG | SYSTOLIC BLOOD PRESSURE: 138 MMHG | HEART RATE: 80 BPM

## 2023-04-04 DIAGNOSIS — G47.33 OBSTRUCTIVE SLEEP APNEA (ADULT) (PEDIATRIC): ICD-10-CM

## 2023-04-04 DIAGNOSIS — N18.1 CHRONIC KIDNEY DISEASE, STAGE 1: ICD-10-CM

## 2023-04-04 DIAGNOSIS — K21.9 GASTRO-ESOPHAGEAL REFLUX DISEASE WITHOUT ESOPHAGITIS: ICD-10-CM

## 2023-04-04 DIAGNOSIS — Z29.9 ENCOUNTER FOR PROPHYLACTIC MEASURES, UNSPECIFIED: ICD-10-CM

## 2023-04-04 DIAGNOSIS — I10 ESSENTIAL (PRIMARY) HYPERTENSION: ICD-10-CM

## 2023-04-04 DIAGNOSIS — Z01.818 ENCOUNTER FOR OTHER PREPROCEDURAL EXAMINATION: ICD-10-CM

## 2023-04-04 DIAGNOSIS — F32.9 MAJOR DEPRESSIVE DISORDER, SINGLE EPISODE, UNSPECIFIED: ICD-10-CM

## 2023-04-04 DIAGNOSIS — Z90.49 ACQUIRED ABSENCE OF OTHER SPECIFIED PARTS OF DIGESTIVE TRACT: Chronic | ICD-10-CM

## 2023-04-04 DIAGNOSIS — Z86.59 PERSONAL HISTORY OF OTHER MENTAL AND BEHAVIORAL DISORDERS: ICD-10-CM

## 2023-04-04 DIAGNOSIS — E78.00 PURE HYPERCHOLESTEROLEMIA, UNSPECIFIED: ICD-10-CM

## 2023-04-04 DIAGNOSIS — Z96.82 PRESENCE OF NEUROSTIMULATOR: Chronic | ICD-10-CM

## 2023-04-04 DIAGNOSIS — E11.9 TYPE 2 DIABETES MELLITUS WITHOUT COMPLICATIONS: ICD-10-CM

## 2023-04-04 LAB
A1C WITH ESTIMATED AVERAGE GLUCOSE RESULT: 6.2 % — HIGH (ref 4–5.6)
ANION GAP SERPL CALC-SCNC: 12 MMOL/L — SIGNIFICANT CHANGE UP (ref 5–17)
APTT BLD: 30.5 SEC — SIGNIFICANT CHANGE UP (ref 27.5–35.5)
BASOPHILS # BLD AUTO: 0.08 K/UL — SIGNIFICANT CHANGE UP (ref 0–0.2)
BASOPHILS NFR BLD AUTO: 1.1 % — SIGNIFICANT CHANGE UP (ref 0–2)
BLD GP AB SCN SERPL QL: SIGNIFICANT CHANGE UP
BUN SERPL-MCNC: 20.5 MG/DL — HIGH (ref 8–20)
CALCIUM SERPL-MCNC: 9.3 MG/DL — SIGNIFICANT CHANGE UP (ref 8.4–10.5)
CHLORIDE SERPL-SCNC: 106 MMOL/L — SIGNIFICANT CHANGE UP (ref 96–108)
CO2 SERPL-SCNC: 23 MMOL/L — SIGNIFICANT CHANGE UP (ref 22–29)
CREAT SERPL-MCNC: 1.16 MG/DL — SIGNIFICANT CHANGE UP (ref 0.5–1.3)
EGFR: 56 ML/MIN/1.73M2 — LOW
EOSINOPHIL # BLD AUTO: 0.2 K/UL — SIGNIFICANT CHANGE UP (ref 0–0.5)
EOSINOPHIL NFR BLD AUTO: 2.7 % — SIGNIFICANT CHANGE UP (ref 0–6)
ESTIMATED AVERAGE GLUCOSE: 131 MG/DL — HIGH (ref 68–114)
GLUCOSE SERPL-MCNC: 151 MG/DL — HIGH (ref 70–99)
HCT VFR BLD CALC: 37.5 % — SIGNIFICANT CHANGE UP (ref 34.5–45)
HGB BLD-MCNC: 12.3 G/DL — SIGNIFICANT CHANGE UP (ref 11.5–15.5)
IMM GRANULOCYTES NFR BLD AUTO: 0.3 % — SIGNIFICANT CHANGE UP (ref 0–0.9)
INR BLD: 1 RATIO — SIGNIFICANT CHANGE UP (ref 0.88–1.16)
LYMPHOCYTES # BLD AUTO: 1.73 K/UL — SIGNIFICANT CHANGE UP (ref 1–3.3)
LYMPHOCYTES # BLD AUTO: 23 % — SIGNIFICANT CHANGE UP (ref 13–44)
MCHC RBC-ENTMCNC: 29 PG — SIGNIFICANT CHANGE UP (ref 27–34)
MCHC RBC-ENTMCNC: 32.8 GM/DL — SIGNIFICANT CHANGE UP (ref 32–36)
MCV RBC AUTO: 88.4 FL — SIGNIFICANT CHANGE UP (ref 80–100)
MONOCYTES # BLD AUTO: 0.59 K/UL — SIGNIFICANT CHANGE UP (ref 0–0.9)
MONOCYTES NFR BLD AUTO: 7.8 % — SIGNIFICANT CHANGE UP (ref 2–14)
NEUTROPHILS # BLD AUTO: 4.9 K/UL — SIGNIFICANT CHANGE UP (ref 1.8–7.4)
NEUTROPHILS NFR BLD AUTO: 65.1 % — SIGNIFICANT CHANGE UP (ref 43–77)
PLATELET # BLD AUTO: 245 K/UL — SIGNIFICANT CHANGE UP (ref 150–400)
POTASSIUM SERPL-MCNC: 4.5 MMOL/L — SIGNIFICANT CHANGE UP (ref 3.5–5.3)
POTASSIUM SERPL-SCNC: 4.5 MMOL/L — SIGNIFICANT CHANGE UP (ref 3.5–5.3)
PROTHROM AB SERPL-ACNC: 11.6 SEC — SIGNIFICANT CHANGE UP (ref 10.5–13.4)
RBC # BLD: 4.24 M/UL — SIGNIFICANT CHANGE UP (ref 3.8–5.2)
RBC # FLD: 13 % — SIGNIFICANT CHANGE UP (ref 10.3–14.5)
SODIUM SERPL-SCNC: 141 MMOL/L — SIGNIFICANT CHANGE UP (ref 135–145)
WBC # BLD: 7.52 K/UL — SIGNIFICANT CHANGE UP (ref 3.8–10.5)
WBC # FLD AUTO: 7.52 K/UL — SIGNIFICANT CHANGE UP (ref 3.8–10.5)

## 2023-04-04 PROCEDURE — 93010 ELECTROCARDIOGRAM REPORT: CPT

## 2023-04-04 PROCEDURE — G0463: CPT

## 2023-04-04 PROCEDURE — 93005 ELECTROCARDIOGRAM TRACING: CPT

## 2023-04-04 NOTE — H&P PST ADULT - NSICDXFAMILYHX_GEN_ALL_CORE_FT
FAMILY HISTORY:  Father  Still living? No  FH: heart disease, Age at diagnosis: Age Unknown  FH: hypertension, Age at diagnosis: Age Unknown    Mother  Still living? Yes, Estimated age: Age Unknown  FH: hypertension, Age at diagnosis: Age Unknown

## 2023-04-04 NOTE — H&P PST ADULT - OTHER CARE PROVIDERS
will see Dr. Stephenson for cardiac clearance pending -allscript will see Dr. Stephenson for cardiac clearance pending - Allscript

## 2023-04-04 NOTE — H&P PST ADULT - NSICDXPASTMEDICALHX_GEN_ALL_CORE_FT
PAST MEDICAL HISTORY:  Diabetes mellitus     High cholesterol     History of OCD (obsessive compulsive disorder)     Hypertension     Major depression     Obstructive sleep apnea     Stage 1 chronic kidney disease

## 2023-04-04 NOTE — H&P PST ADULT - ASSESSMENT
53 year old female  with PMH of HTN, HLD, diabetes, depression, severe LORA (CPAP), OCD, here for PST reports severe GERD and Hiatal Hernia, She has been on PPI therapy for many years and remains with reflux. She coughs and dry heaves daily for years and feels it is part of her reflux. She has attempted low acid diet and has had no positive relief. She noticed hoarseness /  change in her voice over the years, she is now scheduled for a Upper Endoscopy, Robotic assisted Hiatal Hernia Repair with Fundoplication by Dr. Lr on 23. Covid vaccine series completed, card in chart, covid test is on 23. Medical and cardiac clearance pending.    medications reviewed, instructions given on what medications to take and what not to take. She can take her luvox, Wellbutrin Omeprazole, Gabapentin, Irbesartan, Levothyroxine, amlodipine, with one sip of water in the  Am of surgery. Asked the pt not to take DM meds on the DOP (Metformin, Glimepiride ). All other meds can be continued till the night before surgery. pt is not taking ASA/Plavix/Anticoagulation medication at this time. Asked the pt not to take any NSAID's 5-7 days before surgery and told the pt Tylenol is okay to take for pain, pt verbalized understanding.   CAPRINI VTE 2.0 SCORE [CLOT updated 2019]    AGE RELATED RISK FACTORS                                                       MOBILITY RELATED FACTORS  [x ] Age 41-60 years                                            (1 Point)                    [ ] Bed rest                                                        (1 Point)  [ ] Age: 61-74 years                                           (2 Points)                  [ ] Plaster cast                                                   (2 Points)  [ ] Age= 75 years                                              (3 Points)                    [ ] Bed bound for more than 72 hours                 (2 Points)    DISEASE RELATED RISK FACTORS                                               GENDER SPECIFIC FACTORS  [ ] Edema in the lower extremities                       (1 Point)              [ ] Pregnancy                                                     (1 Point)  [ ] Varicose veins                                               (1 Point)                     [ ] Post-partum < 6 weeks                                   (1 Point)             [ x] BMI > 25 Kg/m2                                            (1 Point)                     [ ] Hormonal therapy  or oral contraception          (1 Point)                 [ ] Sepsis (in the previous month)                        (1 Point)               [ ] History of pregnancy complications                 (1 point)  [ ] Pneumonia or serious lung disease                                               [ ] Unexplained or recurrent                     (1 Point)           (in the previous month)                               (1 Point)  [ ] Abnormal pulmonary function test                     (1 Point)                 SURGERY RELATED RISK FACTORS  [ ] Acute myocardial infarction                              (1 Point)               [ ]  Section                                             (1 Point)  [ ] Congestive heart failure (in the previous month)  (1 Point)      [ ] Minor surgery                                                  (1 Point)   [ ] Inflammatory bowel disease                             (1 Point)               [ ] Arthroscopic surgery                                        (2 Points)  [ ] Central venous access                                      (2 Points)                [x ] General surgery lasting more than 45 minutes (2 points)  [ ] Malignancy- Present or previous                   (2 Points)                [ ] Elective arthroplasty                                         (5 points)    [ ] Stroke (in the previous month)                          (5 Points)                                                                                                                                                           HEMATOLOGY RELATED FACTORS                                                 TRAUMA RELATED RISK FACTORS  [ ] Prior episodes of VTE                                     (3 Points)                [ ] Fracture of the hip, pelvis, or leg                       (5 Points)  [ ] Positive family history for VTE                         (3 Points)             [ ] Acute spinal cord injury (in the previous month)  (5 Points)  [ ] Prothrombin 03054 A                                     (3 Points)               [ ] Paralysis  (less than 1 month)                             (5 Points)  [ ] Factor V Leiden                                             (3 Points)                  [ ] Multiple Trauma within 1 month                        (5 Points)  [ ] Lupus anticoagulants                                     (3 Points)                                                           [ ] Anticardiolipin antibodies                               (3 Points)                                                       [ ] High homocysteine in the blood                      (3 Points)                                             [ ] Other congenital or acquired thrombophilia      (3 Points)                                                [ ] Heparin induced thrombocytopenia                  (3 Points)                                     Total Score [     4     ]  OPIOID RISK TOOL    ANALILIA EACH BOX THAT APPLIES AND ADD TOTALS AT THE END    FAMILY HISTORY OF SUBSTANCE ABUSE                 FEMALE         MALE                                                Alcohol                             [  ]1 pt          [  ]3pts                                               Illegal Drugs                     [  ]2 pts        [  ]3pts                                               Rx Drugs                           [  ]4 pts        [  ]4 pts    PERSONAL HISTORY OF SUBSTANCE ABUSE                                                                                          Alcohol                             [  ]3 pts       [  ]3 pts                                               Illegal Drugs                     [  ]4 pts        [  ]4 pts                                               Rx Drugs                           [  ]5 pts        [  ]5 pts    AGE BETWEEN 16-45 YEARS                                      [  ]1 pt         [  ]1 pt    HISTORY OF PREADOLESCENT   SEXUAL ABUSE                                                             [  ]3 pts        [  ]0pts    PSYCHOLOGICAL DISEASE                     ADD, OCD, Bipolar, Schizophrenia        [  ]2 pts         [  ]2 pts                      Depression                                               [  ]1 pt           [  ]1 pt           SCORING TOTAL   (add numbers and type here)              ( 0)                                     A score of 3 or lower indicated LOW risk for future opioid abuse  A score of 4 to 7 indicated moderate risk for future opioid abuse  A score of 8 or higher indicates a high risk for opioid abuse

## 2023-04-04 NOTE — H&P PST ADULT - PROBLEM SELECTOR PLAN 1
OFFICE VISIT    History    Rhonda Amin is a 23 year old female    who presents today for :    B/l ear pain w/ sore throat and some PND. No ear discharge. Sinus pressure but no pain. No fevers  No recent travel, no known covid 10 contact. No rash. No GI sx. No cough cp or sob. She is not using anything.     10 point review of systems reviewed and negative except as above.    There are no preventive care reminders to display for this patient.  Current Outpatient Medications   Medication Sig Dispense Refill   • Multiple Vitamins-Minerals (WOMENS MULTI VITAMIN & MINERAL PO) Take 1 tablet by mouth daily.     • fluconazole (DIFLUCAN) 150 MG tablet Take 1 tablet by mouth every 3 days for 3 doses. 3 tablet 0     No current facility-administered medications for this visit.      ALLERGIES:  No Known Allergies  Past Medical History:   Diagnosis Date   • Amenorrhea    • BV (bacterial vaginosis) 04/15/2016   • Candida vaginitis 04/15/2016   • Chlamydia 04/02/2016   • HPV in female    • STD (sexually transmitted disease)      Past Surgical History:   Procedure Laterality Date   • Colposcopy  11/20/2013     Family History   Problem Relation Age of Onset   • Diabetes Mother    • Thyroid Mother    • Hypertension Mother    • High cholesterol Father    • Cancer Maternal Uncle         pancreatic   • Diabetes Sister    • Neurological Disorder Brother    • Pneumonia Maternal Grandmother      Social History     Socioeconomic History   • Marital status: Single     Spouse name: Not on file   • Number of children: Not on file   • Years of education: Not on file   • Highest education level: Not on file   Occupational History   • Not on file   Social Needs   • Financial resource strain: Not on file   • Food insecurity:     Worry: Not on file     Inability: Not on file   • Transportation needs:     Medical: Not on file     Non-medical: Not on file   Tobacco Use   • Smoking status: Never Smoker   • Smokeless tobacco: Never Used    Substance and Sexual Activity   • Alcohol use: Yes     Comment: socially   • Drug use: No   • Sexual activity: Yes     Partners: Male     Birth control/protection: None, Condom     Comment: Single.  (vaginal) 3 year old daughter.    Lifestyle   • Physical activity:     Days per week: Not on file     Minutes per session: Not on file   • Stress: Not on file   Relationships   • Social connections:     Talks on phone: Not on file     Gets together: Not on file     Attends Rastafari service: Not on file     Active member of club or organization: Not on file     Attends meetings of clubs or organizations: Not on file     Relationship status: Not on file   • Intimate partner violence:     Fear of current or ex partner: Not on file     Emotionally abused: Not on file     Physically abused: Not on file     Forced sexual activity: Not on file   Other Topics Concern   • Not on file   Social History Narrative    In a relationship    1 child    Works as an RN          Physical Exam      Vital Signs:    Visit Vitals  /86   Pulse 80   Resp 18   Ht 5' 4\" (1.626 m)   Wt 104.3 kg   LMP 2019   BMI 39.48 kg/m²     Pulse Ox Interpretation:  Pulse ox not performed  General:  Alert, cooperative, conversive.  Skin:  Warm and dry without rash.  Papules on b/l elbows and R ankle, no erythema or edema  Head:  Normocephalic-atraumatic.   Neck:  Trachea is midline.     Eyes:  Normal conjunctivae and sclerae.   ENT:  Mucous membranes are moist. No pharyngeal erythema or exudate. B/l TMs normal  Cardiovascular:  Symmetrical pulses.  Regular rate and rhythm without murmur.  Respiratory:   Normal respiratory effort.  Clear to auscultation.  No wheezes, rales or rhonchi.  Gastrointestinal:  Soft and nontender.  Normal bowel sounds.  No hepatomegaly or splenomegaly.   Musculoskeletal:  No deformity or edema.   Back:  Normal alignment.  No costovertebral angle tenderness.  Neurologic:  Oriented times 4.  No focal  deficits.  Psychiatric:  Cooperative.  Appropriate mood and affect.    Assessment & Plan    No orders of the defined types were placed in this encounter.    Return in about 2 weeks (around 3/30/2020), or if symptoms worsen or fail to improve.    Viral URI  (primary encounter diagnosis)  Plan:   symptomatic relief:fluids,rest, honey, tylenol  q 4h or ibuprofen  q6h PRN fever/pain, dosing per weight guidelines  OTC medications d/w pt  follow up if no resolution or if symptoms worsening    Follow-up instructions provided.  Red flags discussed.    Instructed patient on correct medication dosing, usage and adverse effects (if applicable).  All questions and concerns discussed.   Patient agreeable to plan.      Jennifer Wooten MD  Family Medicine  [address] [address] [place] [address], [address] [place] [address]  Telephone: [phone]    Fax: [phone]     continue medications as instructed.

## 2023-04-04 NOTE — H&P PST ADULT - PROBLEM SELECTOR PLAN 9
Upper Endoscopy, Robotic assisted Hiatal Hernia Repair with Fundoplication by Dr. Lr on 4/24/23. Covid vaccine series completed, card in chart, covid test is on 4/21/23. Medical and cardiac clearance pending.

## 2023-04-05 ENCOUNTER — APPOINTMENT (OUTPATIENT)
Dept: ULTRASOUND IMAGING | Facility: CLINIC | Age: 53
End: 2023-04-05
Payer: MEDICARE

## 2023-04-05 PROCEDURE — 93975 VASCULAR STUDY: CPT

## 2023-04-07 ENCOUNTER — APPOINTMENT (OUTPATIENT)
Dept: CARDIOLOGY | Facility: CLINIC | Age: 53
End: 2023-04-07
Payer: MEDICARE

## 2023-04-07 VITALS
WEIGHT: 222 LBS | HEIGHT: 63 IN | TEMPERATURE: 97 F | DIASTOLIC BLOOD PRESSURE: 84 MMHG | SYSTOLIC BLOOD PRESSURE: 132 MMHG | BODY MASS INDEX: 39.34 KG/M2 | RESPIRATION RATE: 14 BRPM | OXYGEN SATURATION: 100 % | HEART RATE: 77 BPM

## 2023-04-07 PROBLEM — N18.1 CHRONIC KIDNEY DISEASE, STAGE 1: Chronic | Status: ACTIVE | Noted: 2023-04-04

## 2023-04-07 PROBLEM — Z86.59 PERSONAL HISTORY OF OTHER MENTAL AND BEHAVIORAL DISORDERS: Chronic | Status: ACTIVE | Noted: 2023-04-04

## 2023-04-07 PROBLEM — I10 ESSENTIAL (PRIMARY) HYPERTENSION: Chronic | Status: ACTIVE | Noted: 2023-04-04

## 2023-04-07 PROBLEM — E11.9 TYPE 2 DIABETES MELLITUS WITHOUT COMPLICATIONS: Chronic | Status: ACTIVE | Noted: 2023-04-04

## 2023-04-07 PROBLEM — G47.33 OBSTRUCTIVE SLEEP APNEA (ADULT) (PEDIATRIC): Chronic | Status: ACTIVE | Noted: 2023-04-04

## 2023-04-07 PROBLEM — E78.00 PURE HYPERCHOLESTEROLEMIA, UNSPECIFIED: Chronic | Status: ACTIVE | Noted: 2023-04-04

## 2023-04-07 PROBLEM — F32.9 MAJOR DEPRESSIVE DISORDER, SINGLE EPISODE, UNSPECIFIED: Chronic | Status: ACTIVE | Noted: 2023-04-04

## 2023-04-07 PROCEDURE — 99214 OFFICE O/P EST MOD 30 MIN: CPT

## 2023-04-07 NOTE — ASSESSMENT
[FreeTextEntry1] : EKG 3/27/2023: Sinus rhythm.  Poor R wave progression.  No significant ST or T abnormality.\par \par Preop:\par History of abnormal ST segment response to stress test in the past.  She underwent stress echocardiography on 4/4/2023.  Exercised 8 minutes on Dejuan protocol.  There was no angina.  Overall LVEF and wall motion improved. There was no evidence of ischemia.  This patient would be low risk for major cardiovascular events from the anticipated surgery and should proceed\par \par In the long-term:\par \par Weight reduction would be very helpful.  Dietary changes discussed\par Continue current medications\par Reduction of lipids.  Use statins.  The patient is not sure why it was stopped.  She feels it was an oversight.\par Exercise program.  Perhaps supervised.\par Check LDL and LFT 1 month after statin therapy.  Lab slip given.\par \par Thank you for this referral and allowing me to participate in the care of this patient.  If I can be of any further help or  if you have any questions, please do not hesitate to contact me\par \par \par Sincerely,\par \par Gregorio Stephenson MD, FACC, VENICE\par

## 2023-04-07 NOTE — PHYSICAL EXAM
[Normal] : clear lung fields, good air entry, no respiratory distress [Soft] : abdomen soft [de-identified] : Obesity

## 2023-04-10 LAB — URINE CYTOLOGY: NORMAL

## 2023-04-23 ENCOUNTER — TRANSCRIPTION ENCOUNTER (OUTPATIENT)
Age: 53
End: 2023-04-23

## 2023-04-24 ENCOUNTER — TRANSCRIPTION ENCOUNTER (OUTPATIENT)
Age: 53
End: 2023-04-24

## 2023-04-24 ENCOUNTER — RESULT REVIEW (OUTPATIENT)
Age: 53
End: 2023-04-24

## 2023-04-24 ENCOUNTER — APPOINTMENT (OUTPATIENT)
Dept: THORACIC SURGERY | Facility: HOSPITAL | Age: 53
End: 2023-04-24

## 2023-04-24 ENCOUNTER — INPATIENT (INPATIENT)
Facility: HOSPITAL | Age: 53
LOS: 2 days | Discharge: HOME | DRG: 327 | End: 2023-04-27
Attending: THORACIC SURGERY (CARDIOTHORACIC VASCULAR SURGERY) | Admitting: THORACIC SURGERY (CARDIOTHORACIC VASCULAR SURGERY)
Payer: MEDICARE

## 2023-04-24 VITALS
HEIGHT: 64 IN | HEART RATE: 75 BPM | DIASTOLIC BLOOD PRESSURE: 65 MMHG | WEIGHT: 218.92 LBS | SYSTOLIC BLOOD PRESSURE: 142 MMHG | OXYGEN SATURATION: 97 % | RESPIRATION RATE: 16 BRPM | TEMPERATURE: 98 F

## 2023-04-24 DIAGNOSIS — Z90.49 ACQUIRED ABSENCE OF OTHER SPECIFIED PARTS OF DIGESTIVE TRACT: Chronic | ICD-10-CM

## 2023-04-24 DIAGNOSIS — K21.9 GASTRO-ESOPHAGEAL REFLUX DISEASE WITHOUT ESOPHAGITIS: ICD-10-CM

## 2023-04-24 DIAGNOSIS — Z96.82 PRESENCE OF NEUROSTIMULATOR: Chronic | ICD-10-CM

## 2023-04-24 LAB
ABO RH CONFIRMATION: SIGNIFICANT CHANGE UP
ANION GAP SERPL CALC-SCNC: 16 MMOL/L — SIGNIFICANT CHANGE UP (ref 5–17)
BASOPHILS # BLD AUTO: 0.04 K/UL — SIGNIFICANT CHANGE UP (ref 0–0.2)
BASOPHILS NFR BLD AUTO: 0.4 % — SIGNIFICANT CHANGE UP (ref 0–2)
BUN SERPL-MCNC: 14.8 MG/DL — SIGNIFICANT CHANGE UP (ref 8–20)
CALCIUM SERPL-MCNC: 9.7 MG/DL — SIGNIFICANT CHANGE UP (ref 8.4–10.5)
CHLORIDE SERPL-SCNC: 106 MMOL/L — SIGNIFICANT CHANGE UP (ref 96–108)
CO2 SERPL-SCNC: 20 MMOL/L — LOW (ref 22–29)
CREAT SERPL-MCNC: 1.21 MG/DL — SIGNIFICANT CHANGE UP (ref 0.5–1.3)
EGFR: 53 ML/MIN/1.73M2 — LOW
EOSINOPHIL # BLD AUTO: 0.03 K/UL — SIGNIFICANT CHANGE UP (ref 0–0.5)
EOSINOPHIL NFR BLD AUTO: 0.3 % — SIGNIFICANT CHANGE UP (ref 0–6)
GLUCOSE BLDC GLUCOMTR-MCNC: 139 MG/DL — HIGH (ref 70–99)
GLUCOSE BLDC GLUCOMTR-MCNC: 174 MG/DL — HIGH (ref 70–99)
GLUCOSE BLDC GLUCOMTR-MCNC: 181 MG/DL — HIGH (ref 70–99)
GLUCOSE SERPL-MCNC: 177 MG/DL — HIGH (ref 70–99)
HCT VFR BLD CALC: 35.1 % — SIGNIFICANT CHANGE UP (ref 34.5–45)
HGB BLD-MCNC: 11.7 G/DL — SIGNIFICANT CHANGE UP (ref 11.5–15.5)
IMM GRANULOCYTES NFR BLD AUTO: 0.4 % — SIGNIFICANT CHANGE UP (ref 0–0.9)
LYMPHOCYTES # BLD AUTO: 1.05 K/UL — SIGNIFICANT CHANGE UP (ref 1–3.3)
LYMPHOCYTES # BLD AUTO: 9.5 % — LOW (ref 13–44)
MAGNESIUM SERPL-MCNC: 1.8 MG/DL — SIGNIFICANT CHANGE UP (ref 1.6–2.6)
MCHC RBC-ENTMCNC: 29.3 PG — SIGNIFICANT CHANGE UP (ref 27–34)
MCHC RBC-ENTMCNC: 33.3 GM/DL — SIGNIFICANT CHANGE UP (ref 32–36)
MCV RBC AUTO: 88 FL — SIGNIFICANT CHANGE UP (ref 80–100)
MONOCYTES # BLD AUTO: 0.45 K/UL — SIGNIFICANT CHANGE UP (ref 0–0.9)
MONOCYTES NFR BLD AUTO: 4.1 % — SIGNIFICANT CHANGE UP (ref 2–14)
NEUTROPHILS # BLD AUTO: 9.47 K/UL — HIGH (ref 1.8–7.4)
NEUTROPHILS NFR BLD AUTO: 85.3 % — HIGH (ref 43–77)
PHOSPHATE SERPL-MCNC: 4.8 MG/DL — HIGH (ref 2.4–4.7)
PLATELET # BLD AUTO: 185 K/UL — SIGNIFICANT CHANGE UP (ref 150–400)
POTASSIUM SERPL-MCNC: 3.7 MMOL/L — SIGNIFICANT CHANGE UP (ref 3.5–5.3)
POTASSIUM SERPL-SCNC: 3.7 MMOL/L — SIGNIFICANT CHANGE UP (ref 3.5–5.3)
RBC # BLD: 3.99 M/UL — SIGNIFICANT CHANGE UP (ref 3.8–5.2)
RBC # FLD: 12.8 % — SIGNIFICANT CHANGE UP (ref 10.3–14.5)
SODIUM SERPL-SCNC: 142 MMOL/L — SIGNIFICANT CHANGE UP (ref 135–145)
WBC # BLD: 11.08 K/UL — HIGH (ref 3.8–10.5)
WBC # FLD AUTO: 11.08 K/UL — HIGH (ref 3.8–10.5)

## 2023-04-24 PROCEDURE — 99222 1ST HOSP IP/OBS MODERATE 55: CPT | Mod: FS

## 2023-04-24 PROCEDURE — S2900 ROBOTIC SURGICAL SYSTEM: CPT | Mod: NC

## 2023-04-24 PROCEDURE — 43281 LAP PARAESOPHAG HERN REPAIR: CPT | Mod: AS

## 2023-04-24 PROCEDURE — 71045 X-RAY EXAM CHEST 1 VIEW: CPT | Mod: 26,76

## 2023-04-24 PROCEDURE — 43235 EGD DIAGNOSTIC BRUSH WASH: CPT

## 2023-04-24 PROCEDURE — 88302 TISSUE EXAM BY PATHOLOGIST: CPT | Mod: 26

## 2023-04-24 PROCEDURE — 43281 LAP PARAESOPHAG HERN REPAIR: CPT

## 2023-04-24 DEVICE — SURGICEL FIBRILLAR 4 X 4": Type: IMPLANTABLE DEVICE | Status: FUNCTIONAL

## 2023-04-24 RX ORDER — ONDANSETRON 8 MG/1
4 TABLET, FILM COATED ORAL EVERY 6 HOURS
Refills: 0 | Status: DISCONTINUED | OUTPATIENT
Start: 2023-04-24 | End: 2023-04-27

## 2023-04-24 RX ORDER — DEXTROSE 50 % IN WATER 50 %
25 SYRINGE (ML) INTRAVENOUS ONCE
Refills: 0 | Status: DISCONTINUED | OUTPATIENT
Start: 2023-04-24 | End: 2023-04-25

## 2023-04-24 RX ORDER — MORPHINE SULFATE 50 MG/1
2 CAPSULE, EXTENDED RELEASE ORAL EVERY 4 HOURS
Refills: 0 | Status: DISCONTINUED | OUTPATIENT
Start: 2023-04-24 | End: 2023-04-25

## 2023-04-24 RX ORDER — VANCOMYCIN HCL 1 G
1500 VIAL (EA) INTRAVENOUS ONCE
Refills: 0 | Status: COMPLETED | OUTPATIENT
Start: 2023-04-24 | End: 2023-04-24

## 2023-04-24 RX ORDER — SODIUM CHLORIDE 9 MG/ML
3 INJECTION INTRAMUSCULAR; INTRAVENOUS; SUBCUTANEOUS EVERY 8 HOURS
Refills: 0 | Status: DISCONTINUED | OUTPATIENT
Start: 2023-04-24 | End: 2023-04-24

## 2023-04-24 RX ORDER — DEXTROSE 50 % IN WATER 50 %
12.5 SYRINGE (ML) INTRAVENOUS ONCE
Refills: 0 | Status: DISCONTINUED | OUTPATIENT
Start: 2023-04-24 | End: 2023-04-25

## 2023-04-24 RX ORDER — INSULIN LISPRO 100/ML
VIAL (ML) SUBCUTANEOUS
Refills: 0 | Status: DISCONTINUED | OUTPATIENT
Start: 2023-04-24 | End: 2023-04-27

## 2023-04-24 RX ORDER — LEVOTHYROXINE SODIUM 125 MCG
75 TABLET ORAL AT BEDTIME
Refills: 0 | Status: DISCONTINUED | OUTPATIENT
Start: 2023-04-24 | End: 2023-04-25

## 2023-04-24 RX ORDER — SODIUM CHLORIDE 9 MG/ML
1000 INJECTION, SOLUTION INTRAVENOUS
Refills: 0 | Status: DISCONTINUED | OUTPATIENT
Start: 2023-04-24 | End: 2023-04-25

## 2023-04-24 RX ORDER — INSULIN LISPRO 100/ML
VIAL (ML) SUBCUTANEOUS AT BEDTIME
Refills: 0 | Status: DISCONTINUED | OUTPATIENT
Start: 2023-04-24 | End: 2023-04-27

## 2023-04-24 RX ORDER — GLUCAGON INJECTION, SOLUTION 0.5 MG/.1ML
1 INJECTION, SOLUTION SUBCUTANEOUS ONCE
Refills: 0 | Status: DISCONTINUED | OUTPATIENT
Start: 2023-04-24 | End: 2023-04-25

## 2023-04-24 RX ORDER — PANTOPRAZOLE SODIUM 20 MG/1
40 TABLET, DELAYED RELEASE ORAL DAILY
Refills: 0 | Status: DISCONTINUED | OUTPATIENT
Start: 2023-04-24 | End: 2023-04-25

## 2023-04-24 RX ORDER — DEXTROSE 50 % IN WATER 50 %
15 SYRINGE (ML) INTRAVENOUS ONCE
Refills: 0 | Status: DISCONTINUED | OUTPATIENT
Start: 2023-04-24 | End: 2023-04-25

## 2023-04-24 RX ORDER — MORPHINE SULFATE 50 MG/1
4 CAPSULE, EXTENDED RELEASE ORAL EVERY 4 HOURS
Refills: 0 | Status: DISCONTINUED | OUTPATIENT
Start: 2023-04-24 | End: 2023-04-25

## 2023-04-24 RX ORDER — HEPARIN SODIUM 5000 [USP'U]/ML
5000 INJECTION INTRAVENOUS; SUBCUTANEOUS EVERY 8 HOURS
Refills: 0 | Status: DISCONTINUED | OUTPATIENT
Start: 2023-04-25 | End: 2023-04-27

## 2023-04-24 RX ORDER — POTASSIUM CHLORIDE 20 MEQ
10 PACKET (EA) ORAL
Refills: 0 | Status: COMPLETED | OUTPATIENT
Start: 2023-04-24 | End: 2023-04-24

## 2023-04-24 RX ORDER — ACETAMINOPHEN 500 MG
1000 TABLET ORAL ONCE
Refills: 0 | Status: COMPLETED | OUTPATIENT
Start: 2023-04-24 | End: 2023-04-24

## 2023-04-24 RX ORDER — MAGNESIUM SULFATE 500 MG/ML
2 VIAL (ML) INJECTION ONCE
Refills: 0 | Status: COMPLETED | OUTPATIENT
Start: 2023-04-24 | End: 2023-04-24

## 2023-04-24 RX ADMIN — Medication 25 GRAM(S): at 20:19

## 2023-04-24 RX ADMIN — Medication 100 MILLIEQUIVALENT(S): at 18:57

## 2023-04-24 RX ADMIN — MORPHINE SULFATE 2 MILLIGRAM(S): 50 CAPSULE, EXTENDED RELEASE ORAL at 22:43

## 2023-04-24 RX ADMIN — MORPHINE SULFATE 2 MILLIGRAM(S): 50 CAPSULE, EXTENDED RELEASE ORAL at 22:29

## 2023-04-24 RX ADMIN — Medication 400 MILLIGRAM(S): at 16:53

## 2023-04-24 RX ADMIN — MORPHINE SULFATE 2 MILLIGRAM(S): 50 CAPSULE, EXTENDED RELEASE ORAL at 18:45

## 2023-04-24 RX ADMIN — Medication 100 MILLIEQUIVALENT(S): at 21:34

## 2023-04-24 RX ADMIN — MORPHINE SULFATE 4 MILLIGRAM(S): 50 CAPSULE, EXTENDED RELEASE ORAL at 23:51

## 2023-04-24 RX ADMIN — Medication 300 MILLIGRAM(S): at 12:26

## 2023-04-24 RX ADMIN — ONDANSETRON 4 MILLIGRAM(S): 8 TABLET, FILM COATED ORAL at 23:46

## 2023-04-24 RX ADMIN — Medication 75 MICROGRAM(S): at 21:36

## 2023-04-24 RX ADMIN — MORPHINE SULFATE 2 MILLIGRAM(S): 50 CAPSULE, EXTENDED RELEASE ORAL at 18:30

## 2023-04-24 RX ADMIN — Medication 100 MILLIEQUIVALENT(S): at 20:19

## 2023-04-24 RX ADMIN — ONDANSETRON 4 MILLIGRAM(S): 8 TABLET, FILM COATED ORAL at 17:04

## 2023-04-24 RX ADMIN — PANTOPRAZOLE SODIUM 40 MILLIGRAM(S): 20 TABLET, DELAYED RELEASE ORAL at 17:04

## 2023-04-24 RX ADMIN — SODIUM CHLORIDE 75 MILLILITER(S): 9 INJECTION, SOLUTION INTRAVENOUS at 21:34

## 2023-04-24 RX ADMIN — Medication 1000 MILLIGRAM(S): at 17:08

## 2023-04-24 NOTE — BRIEF OPERATIVE NOTE - NSICDXBRIEFPROCEDURE_GEN_ALL_CORE_FT
PROCEDURES:  EGD 24-Apr-2023 16:48:36  Karlene Rodriges  Repair, hiatal hernia, robot-assisted 24-Apr-2023 16:48:50 Toupet fundoplication Karlene Rodriges

## 2023-04-25 LAB
ANION GAP SERPL CALC-SCNC: 16 MMOL/L — SIGNIFICANT CHANGE UP (ref 5–17)
BASOPHILS # BLD AUTO: 0.02 K/UL — SIGNIFICANT CHANGE UP (ref 0–0.2)
BASOPHILS NFR BLD AUTO: 0.2 % — SIGNIFICANT CHANGE UP (ref 0–2)
BUN SERPL-MCNC: 12 MG/DL — SIGNIFICANT CHANGE UP (ref 8–20)
CALCIUM SERPL-MCNC: 9 MG/DL — SIGNIFICANT CHANGE UP (ref 8.4–10.5)
CHLORIDE SERPL-SCNC: 106 MMOL/L — SIGNIFICANT CHANGE UP (ref 96–108)
CO2 SERPL-SCNC: 18 MMOL/L — LOW (ref 22–29)
CREAT SERPL-MCNC: 1.1 MG/DL — SIGNIFICANT CHANGE UP (ref 0.5–1.3)
EGFR: 59 ML/MIN/1.73M2 — LOW
EOSINOPHIL # BLD AUTO: 0 K/UL — SIGNIFICANT CHANGE UP (ref 0–0.5)
EOSINOPHIL NFR BLD AUTO: 0 % — SIGNIFICANT CHANGE UP (ref 0–6)
GLUCOSE BLDC GLUCOMTR-MCNC: 110 MG/DL — HIGH (ref 70–99)
GLUCOSE BLDC GLUCOMTR-MCNC: 127 MG/DL — HIGH (ref 70–99)
GLUCOSE BLDC GLUCOMTR-MCNC: 144 MG/DL — HIGH (ref 70–99)
GLUCOSE BLDC GLUCOMTR-MCNC: 162 MG/DL — HIGH (ref 70–99)
GLUCOSE SERPL-MCNC: 186 MG/DL — HIGH (ref 70–99)
HCT VFR BLD CALC: 36.5 % — SIGNIFICANT CHANGE UP (ref 34.5–45)
HGB BLD-MCNC: 12.4 G/DL — SIGNIFICANT CHANGE UP (ref 11.5–15.5)
IMM GRANULOCYTES NFR BLD AUTO: 0.5 % — SIGNIFICANT CHANGE UP (ref 0–0.9)
LYMPHOCYTES # BLD AUTO: 0.63 K/UL — LOW (ref 1–3.3)
LYMPHOCYTES # BLD AUTO: 5.7 % — LOW (ref 13–44)
MAGNESIUM SERPL-MCNC: 2.2 MG/DL — SIGNIFICANT CHANGE UP (ref 1.6–2.6)
MCHC RBC-ENTMCNC: 29.5 PG — SIGNIFICANT CHANGE UP (ref 27–34)
MCHC RBC-ENTMCNC: 34 GM/DL — SIGNIFICANT CHANGE UP (ref 32–36)
MCV RBC AUTO: 86.9 FL — SIGNIFICANT CHANGE UP (ref 80–100)
MONOCYTES # BLD AUTO: 0.47 K/UL — SIGNIFICANT CHANGE UP (ref 0–0.9)
MONOCYTES NFR BLD AUTO: 4.2 % — SIGNIFICANT CHANGE UP (ref 2–14)
NEUTROPHILS # BLD AUTO: 9.89 K/UL — HIGH (ref 1.8–7.4)
NEUTROPHILS NFR BLD AUTO: 89.4 % — HIGH (ref 43–77)
PHOSPHATE SERPL-MCNC: 4.5 MG/DL — SIGNIFICANT CHANGE UP (ref 2.4–4.7)
PLATELET # BLD AUTO: 198 K/UL — SIGNIFICANT CHANGE UP (ref 150–400)
POTASSIUM SERPL-MCNC: 4.4 MMOL/L — SIGNIFICANT CHANGE UP (ref 3.5–5.3)
POTASSIUM SERPL-SCNC: 4.4 MMOL/L — SIGNIFICANT CHANGE UP (ref 3.5–5.3)
RBC # BLD: 4.2 M/UL — SIGNIFICANT CHANGE UP (ref 3.8–5.2)
RBC # FLD: 12.7 % — SIGNIFICANT CHANGE UP (ref 10.3–14.5)
SODIUM SERPL-SCNC: 140 MMOL/L — SIGNIFICANT CHANGE UP (ref 135–145)
WBC # BLD: 11.07 K/UL — HIGH (ref 3.8–10.5)
WBC # FLD AUTO: 11.07 K/UL — HIGH (ref 3.8–10.5)

## 2023-04-25 PROCEDURE — 71250 CT THORAX DX C-: CPT | Mod: 26

## 2023-04-25 PROCEDURE — 99024 POSTOP FOLLOW-UP VISIT: CPT

## 2023-04-25 PROCEDURE — 99233 SBSQ HOSP IP/OBS HIGH 50: CPT

## 2023-04-25 RX ORDER — ATORVASTATIN CALCIUM 80 MG/1
40 TABLET, FILM COATED ORAL AT BEDTIME
Refills: 0 | Status: DISCONTINUED | OUTPATIENT
Start: 2023-04-25 | End: 2023-04-27

## 2023-04-25 RX ORDER — PANTOPRAZOLE SODIUM 20 MG/1
40 TABLET, DELAYED RELEASE ORAL
Refills: 0 | Status: DISCONTINUED | OUTPATIENT
Start: 2023-04-25 | End: 2023-04-27

## 2023-04-25 RX ORDER — METFORMIN HYDROCHLORIDE 850 MG/1
1 TABLET ORAL
Refills: 0 | DISCHARGE

## 2023-04-25 RX ORDER — GABAPENTIN 400 MG/1
2 CAPSULE ORAL
Refills: 0 | DISCHARGE

## 2023-04-25 RX ORDER — OXYCODONE HYDROCHLORIDE 5 MG/1
10 TABLET ORAL EVERY 6 HOURS
Refills: 0 | Status: DISCONTINUED | OUTPATIENT
Start: 2023-04-25 | End: 2023-04-27

## 2023-04-25 RX ORDER — LEVOTHYROXINE SODIUM 125 MCG
1 TABLET ORAL
Refills: 0 | DISCHARGE

## 2023-04-25 RX ORDER — GABAPENTIN 400 MG/1
200 CAPSULE ORAL THREE TIMES A DAY
Refills: 0 | Status: DISCONTINUED | OUTPATIENT
Start: 2023-04-25 | End: 2023-04-27

## 2023-04-25 RX ORDER — ATORVASTATIN CALCIUM 80 MG/1
1 TABLET, FILM COATED ORAL
Refills: 0 | DISCHARGE

## 2023-04-25 RX ORDER — IRBESARTAN 75 MG/1
1 TABLET ORAL
Refills: 0 | DISCHARGE

## 2023-04-25 RX ORDER — AMLODIPINE BESYLATE 2.5 MG/1
10 TABLET ORAL DAILY
Refills: 0 | Status: DISCONTINUED | OUTPATIENT
Start: 2023-04-25 | End: 2023-04-27

## 2023-04-25 RX ORDER — FAMOTIDINE 10 MG/ML
1 INJECTION INTRAVENOUS
Refills: 0 | DISCHARGE

## 2023-04-25 RX ORDER — BUPROPION HYDROCHLORIDE 150 MG/1
200 TABLET, EXTENDED RELEASE ORAL DAILY
Refills: 0 | Status: DISCONTINUED | OUTPATIENT
Start: 2023-04-25 | End: 2023-04-25

## 2023-04-25 RX ORDER — AMLODIPINE BESYLATE 2.5 MG/1
1 TABLET ORAL
Refills: 0 | DISCHARGE

## 2023-04-25 RX ORDER — LOSARTAN POTASSIUM 100 MG/1
25 TABLET, FILM COATED ORAL DAILY
Refills: 0 | Status: DISCONTINUED | OUTPATIENT
Start: 2023-04-25 | End: 2023-04-27

## 2023-04-25 RX ORDER — FLUVOXAMINE MALEATE 25 MG/1
2 TABLET ORAL
Refills: 0 | DISCHARGE

## 2023-04-25 RX ORDER — ARIPIPRAZOLE 15 MG/1
1 TABLET ORAL
Refills: 0 | DISCHARGE

## 2023-04-25 RX ORDER — BUPROPION HYDROCHLORIDE 150 MG/1
1 TABLET, EXTENDED RELEASE ORAL
Refills: 0 | DISCHARGE

## 2023-04-25 RX ORDER — FLUVOXAMINE MALEATE 25 MG/1
1 TABLET ORAL
Refills: 0 | DISCHARGE

## 2023-04-25 RX ORDER — BUPROPION HYDROCHLORIDE 150 MG/1
100 TABLET, EXTENDED RELEASE ORAL
Refills: 0 | Status: DISCONTINUED | OUTPATIENT
Start: 2023-04-25 | End: 2023-04-27

## 2023-04-25 RX ORDER — ARIPIPRAZOLE 15 MG/1
2 TABLET ORAL AT BEDTIME
Refills: 0 | Status: DISCONTINUED | OUTPATIENT
Start: 2023-04-25 | End: 2023-04-27

## 2023-04-25 RX ORDER — LEVOTHYROXINE SODIUM 125 MCG
150 TABLET ORAL DAILY
Refills: 0 | Status: DISCONTINUED | OUTPATIENT
Start: 2023-04-25 | End: 2023-04-27

## 2023-04-25 RX ORDER — OXYCODONE HYDROCHLORIDE 5 MG/1
5 TABLET ORAL EVERY 6 HOURS
Refills: 0 | Status: DISCONTINUED | OUTPATIENT
Start: 2023-04-25 | End: 2023-04-27

## 2023-04-25 RX ORDER — ACETAMINOPHEN 500 MG
1000 TABLET ORAL ONCE
Refills: 0 | Status: COMPLETED | OUTPATIENT
Start: 2023-04-25 | End: 2023-04-25

## 2023-04-25 RX ORDER — FLUVOXAMINE MALEATE 25 MG/1
100 TABLET ORAL DAILY
Refills: 0 | Status: DISCONTINUED | OUTPATIENT
Start: 2023-04-25 | End: 2023-04-27

## 2023-04-25 RX ADMIN — OXYCODONE HYDROCHLORIDE 10 MILLIGRAM(S): 5 TABLET ORAL at 22:12

## 2023-04-25 RX ADMIN — OXYCODONE HYDROCHLORIDE 10 MILLIGRAM(S): 5 TABLET ORAL at 22:42

## 2023-04-25 RX ADMIN — Medication 1000 MILLIGRAM(S): at 09:38

## 2023-04-25 RX ADMIN — GABAPENTIN 200 MILLIGRAM(S): 400 CAPSULE ORAL at 22:12

## 2023-04-25 RX ADMIN — MORPHINE SULFATE 2 MILLIGRAM(S): 50 CAPSULE, EXTENDED RELEASE ORAL at 11:18

## 2023-04-25 RX ADMIN — ONDANSETRON 4 MILLIGRAM(S): 8 TABLET, FILM COATED ORAL at 05:12

## 2023-04-25 RX ADMIN — MORPHINE SULFATE 2 MILLIGRAM(S): 50 CAPSULE, EXTENDED RELEASE ORAL at 06:23

## 2023-04-25 RX ADMIN — MORPHINE SULFATE 4 MILLIGRAM(S): 50 CAPSULE, EXTENDED RELEASE ORAL at 00:10

## 2023-04-25 RX ADMIN — MORPHINE SULFATE 2 MILLIGRAM(S): 50 CAPSULE, EXTENDED RELEASE ORAL at 11:03

## 2023-04-25 RX ADMIN — Medication 400 MILLIGRAM(S): at 09:23

## 2023-04-25 RX ADMIN — ATORVASTATIN CALCIUM 40 MILLIGRAM(S): 80 TABLET, FILM COATED ORAL at 22:12

## 2023-04-25 RX ADMIN — MORPHINE SULFATE 4 MILLIGRAM(S): 50 CAPSULE, EXTENDED RELEASE ORAL at 04:32

## 2023-04-25 RX ADMIN — Medication 1: at 07:20

## 2023-04-25 RX ADMIN — FLUVOXAMINE MALEATE 100 MILLIGRAM(S): 25 TABLET ORAL at 14:03

## 2023-04-25 RX ADMIN — PANTOPRAZOLE SODIUM 40 MILLIGRAM(S): 20 TABLET, DELAYED RELEASE ORAL at 11:01

## 2023-04-25 RX ADMIN — ARIPIPRAZOLE 2 MILLIGRAM(S): 15 TABLET ORAL at 22:58

## 2023-04-25 RX ADMIN — HEPARIN SODIUM 5000 UNIT(S): 5000 INJECTION INTRAVENOUS; SUBCUTANEOUS at 05:13

## 2023-04-25 RX ADMIN — BUPROPION HYDROCHLORIDE 100 MILLIGRAM(S): 150 TABLET, EXTENDED RELEASE ORAL at 17:31

## 2023-04-25 RX ADMIN — HEPARIN SODIUM 5000 UNIT(S): 5000 INJECTION INTRAVENOUS; SUBCUTANEOUS at 22:12

## 2023-04-25 RX ADMIN — GABAPENTIN 200 MILLIGRAM(S): 400 CAPSULE ORAL at 14:03

## 2023-04-25 RX ADMIN — MORPHINE SULFATE 2 MILLIGRAM(S): 50 CAPSULE, EXTENDED RELEASE ORAL at 06:40

## 2023-04-25 RX ADMIN — HEPARIN SODIUM 5000 UNIT(S): 5000 INJECTION INTRAVENOUS; SUBCUTANEOUS at 14:03

## 2023-04-25 RX ADMIN — ONDANSETRON 4 MILLIGRAM(S): 8 TABLET, FILM COATED ORAL at 17:31

## 2023-04-25 RX ADMIN — MORPHINE SULFATE 4 MILLIGRAM(S): 50 CAPSULE, EXTENDED RELEASE ORAL at 04:47

## 2023-04-25 RX ADMIN — ONDANSETRON 4 MILLIGRAM(S): 8 TABLET, FILM COATED ORAL at 11:01

## 2023-04-25 NOTE — PHYSICAL THERAPY INITIAL EVALUATION ADULT - ADDITIONAL COMMENTS
Pt. states she lives in a basement apartment;  6 steps to enter with handrail. clinically, looks 2/2 pressure woung ulcer, stage 2? vs not stagable 2/2 eschar. s/p vancomycin and zosyn  - No pain, no active discharge, no systemic signs  - Given severity of ulcer, will c/w vanc + zosyn  - Check esr, crp, and foot x-ray to r/o osteomyelitis.  - Vascular consulted- appreciate recs  -  Consult wound care  - leg elevation

## 2023-04-26 ENCOUNTER — TRANSCRIPTION ENCOUNTER (OUTPATIENT)
Age: 53
End: 2023-04-26

## 2023-04-26 LAB
GLUCOSE BLDC GLUCOMTR-MCNC: 124 MG/DL — HIGH (ref 70–99)
GLUCOSE BLDC GLUCOMTR-MCNC: 139 MG/DL — HIGH (ref 70–99)
GLUCOSE BLDC GLUCOMTR-MCNC: 154 MG/DL — HIGH (ref 70–99)
GLUCOSE BLDC GLUCOMTR-MCNC: 165 MG/DL — HIGH (ref 70–99)

## 2023-04-26 PROCEDURE — 99024 POSTOP FOLLOW-UP VISIT: CPT

## 2023-04-26 RX ADMIN — FLUVOXAMINE MALEATE 100 MILLIGRAM(S): 25 TABLET ORAL at 13:58

## 2023-04-26 RX ADMIN — OXYCODONE HYDROCHLORIDE 10 MILLIGRAM(S): 5 TABLET ORAL at 22:00

## 2023-04-26 RX ADMIN — GABAPENTIN 200 MILLIGRAM(S): 400 CAPSULE ORAL at 06:00

## 2023-04-26 RX ADMIN — Medication 1: at 18:18

## 2023-04-26 RX ADMIN — Medication 150 MICROGRAM(S): at 06:00

## 2023-04-26 RX ADMIN — PANTOPRAZOLE SODIUM 40 MILLIGRAM(S): 20 TABLET, DELAYED RELEASE ORAL at 06:00

## 2023-04-26 RX ADMIN — HEPARIN SODIUM 5000 UNIT(S): 5000 INJECTION INTRAVENOUS; SUBCUTANEOUS at 21:22

## 2023-04-26 RX ADMIN — OXYCODONE HYDROCHLORIDE 10 MILLIGRAM(S): 5 TABLET ORAL at 21:30

## 2023-04-26 RX ADMIN — OXYCODONE HYDROCHLORIDE 10 MILLIGRAM(S): 5 TABLET ORAL at 06:04

## 2023-04-26 RX ADMIN — HEPARIN SODIUM 5000 UNIT(S): 5000 INJECTION INTRAVENOUS; SUBCUTANEOUS at 13:58

## 2023-04-26 RX ADMIN — ONDANSETRON 4 MILLIGRAM(S): 8 TABLET, FILM COATED ORAL at 00:52

## 2023-04-26 RX ADMIN — BUPROPION HYDROCHLORIDE 100 MILLIGRAM(S): 150 TABLET, EXTENDED RELEASE ORAL at 06:06

## 2023-04-26 RX ADMIN — GABAPENTIN 200 MILLIGRAM(S): 400 CAPSULE ORAL at 21:22

## 2023-04-26 RX ADMIN — BUPROPION HYDROCHLORIDE 100 MILLIGRAM(S): 150 TABLET, EXTENDED RELEASE ORAL at 17:52

## 2023-04-26 RX ADMIN — HEPARIN SODIUM 5000 UNIT(S): 5000 INJECTION INTRAVENOUS; SUBCUTANEOUS at 06:00

## 2023-04-26 RX ADMIN — ONDANSETRON 4 MILLIGRAM(S): 8 TABLET, FILM COATED ORAL at 17:52

## 2023-04-26 RX ADMIN — ATORVASTATIN CALCIUM 40 MILLIGRAM(S): 80 TABLET, FILM COATED ORAL at 21:22

## 2023-04-26 RX ADMIN — ONDANSETRON 4 MILLIGRAM(S): 8 TABLET, FILM COATED ORAL at 12:41

## 2023-04-26 RX ADMIN — ONDANSETRON 4 MILLIGRAM(S): 8 TABLET, FILM COATED ORAL at 06:00

## 2023-04-26 RX ADMIN — LOSARTAN POTASSIUM 25 MILLIGRAM(S): 100 TABLET, FILM COATED ORAL at 06:00

## 2023-04-26 RX ADMIN — AMLODIPINE BESYLATE 10 MILLIGRAM(S): 2.5 TABLET ORAL at 06:00

## 2023-04-26 RX ADMIN — Medication 1: at 12:40

## 2023-04-26 RX ADMIN — OXYCODONE HYDROCHLORIDE 10 MILLIGRAM(S): 5 TABLET ORAL at 06:00

## 2023-04-26 RX ADMIN — GABAPENTIN 200 MILLIGRAM(S): 400 CAPSULE ORAL at 13:57

## 2023-04-26 NOTE — DISCHARGE NOTE PROVIDER - NSDCCPTREATMENT_GEN_ALL_CORE_FT
PRINCIPAL PROCEDURE  Procedure: Repair, hiatal hernia, robot-assisted  Findings and Treatment: Toupet fundoplication      SECONDARY PROCEDURE  Procedure: EGD  Findings and Treatment:      no

## 2023-04-26 NOTE — DISCHARGE NOTE PROVIDER - HOSPITAL COURSE
53 year old female with PMH of HTN, HLD, diabetes, depression, severe LORA (CPAP), OCD, c/o severe GERD with a known Hiatal Hernia, experiencing dry cough, dry heaving daily, hoarseness, change in her voice, despite low acid diet and PPI therapy. On 4/24/23 patient underwent Upper Endoscopy, Robotic assisted Hiatal Hernia Repair with Toupet Fundoplication by Dr. Lr. Postoperative course remains uneventful at this time. CT esophagram negative for esophageal leak. Tolerating liquid diet.     Seen by team and deemed suitable for d.c. 53 year old female with PMH of HTN, HLD, diabetes, depression, severe LORA (CPAP), OCD, c/o severe GERD with a known Hiatal Hernia, experiencing dry cough, dry heaving daily, hoarseness, change in her voice, despite low acid diet and PPI therapy. On 4/24/23 patient underwent Upper Endoscopy, Robotic assisted Hiatal Hernia Repair with Toupet Fundoplication by Dr. Lr. Postoperative course remains uneventful at this time. CT esophagram negative for esophageal leak. Tolerating liquid diet.     Seen by team and deemed suitable for d.c.    able to have diet advances to soft/ bite sized at this time

## 2023-04-26 NOTE — DISCHARGE NOTE PROVIDER - NSDCFUADDAPPT_GEN_ALL_CORE_FT
Please follow up with your primary care dr within the next two weeks.  Please alert them of your recent procedure so they can update your medical record accordingly.   FOLLOW UP WITH DR SCHWAB ON 5/11 at 11 AM in our office.       Please follow up with your primary care dr within the next two weeks.  Please alert them of your recent procedure so they can update your medical record accordingly.

## 2023-04-26 NOTE — DISCHARGE NOTE PROVIDER - DISCHARGE DIET
DASH Diet/Low Sodium Diet/Bariatric Diet Phase 1 (Liquids) DASH Diet/Low Sodium Diet/Soft and Bite-Sized Diet/Bariatric Diet Phase 1 (Liquids)/Other Diet Instructions

## 2023-04-26 NOTE — DISCHARGE NOTE PROVIDER - PROVIDER TOKENS
PROVIDER:[TOKEN:[[id number]],FOLLOWUP:[2 weeks]] PROVIDER:[TOKEN:[[id number]],SCHEDULEDAPPT:[05/11/2023],SCHEDULEDAPPTTIME:[11:00 AM]]

## 2023-04-26 NOTE — DISCHARGE NOTE PROVIDER - NSDCFUSCHEDAPPT_GEN_ALL_CORE_FT
Shereen Head  [facility] Physician [facility]  UROGYN [address]   Scheduled Appointment: 05/05/2023    Tyson Mendoza  [facility] Physician [facility]  NEPHRO [address]  Scheduled Appointment: 05/11/2023    Felicia Chavez  [facility]  OBGYNGEN [address]  Scheduled Appointment: 07/10/2023

## 2023-04-26 NOTE — DISCHARGE NOTE PROVIDER - INSTRUCTIONS
Please continue a soft/ bite sized diet and continue to chew your food thoroughly as the inflammation down from the surgery.

## 2023-04-26 NOTE — DISCHARGE NOTE PROVIDER - NSDCFUADDINST_GEN_ALL_CORE_FT
You may shower on THURSDAY.  Wash incisions with soap and water in shower daily, allow the water to wash over incision, do not scrub or pick at scabs. Baby Soap or unscented soaps are a nice mild soap to consider.  Do not use any ointment or lotions on your incisions.  Do not submerge in water.  Please call the office at [phone] if you have any drainage or concern about your wounds.  Please go to the ED for fevers, chills.

## 2023-04-26 NOTE — DISCHARGE NOTE PROVIDER - NSDCCPCAREPLAN_GEN_ALL_CORE_FT
PRINCIPAL DISCHARGE DIAGNOSIS  Diagnosis: Gastro-esophageal reflux disease without esophagitis  Assessment and Plan of Treatment: You had a hiatal hernia repair while you were in the hospital with Dr Lr.  Since you have been able to tolerate your reccomended diet.  We have made a follow up appointment for your on ______ at ________.  Our offices are convienently located on the first floor at [facility].  If you cannot make this appointment please call [phone] to reschedule.   Please continue to take your medications as perscribed.    Please go to the ED for inability to tolerated liquids, worsening nausea and or vomitting and worsening chest pain.      SECONDARY DISCHARGE DIAGNOSES  Diagnosis: Gastro-esophageal reflux disease without esophagitis  Assessment and Plan of Treatment:      PRINCIPAL DISCHARGE DIAGNOSIS  Diagnosis: Gastro-esophageal reflux disease without esophagitis  Assessment and Plan of Treatment: You had a hiatal hernia repair while you were in the hospital with Dr Lr.  Since you have been able to tolerate your reccomended diet.  We have made a follow up appointment for your on May 11th at 11am.  Our offices are convienently located on the first floor at [facility].  If you cannot make this appointment please call [phone] to reschedule.   Please continue to take your medications as perscribed.    Please go to the ED for inability to tolerated liquids, worsening nausea and or vomitting and worsening chest pain.      SECONDARY DISCHARGE DIAGNOSES  Diagnosis: Gastro-esophageal reflux disease without esophagitis  Assessment and Plan of Treatment: Continue take your proton pump inhibitor.  This will help with your refleux in the coming weeks.  Please continue a soft/ bite sized diet and continue to chew your food thoroughly as the inflammation down from the surgery.

## 2023-04-26 NOTE — DISCHARGE NOTE PROVIDER - CARE PROVIDER_API CALL
Rolando Lr)  Thoracic Surgery  [address] [place] [address]  Phone: [phone]  Fax: [phone]  Follow Up Time: 2 weeks   Rolando Lr)  Thoracic Surgery  [address] [place] [address]  Phone: [phone]  Fax: [phone]  Scheduled Appointment: 05/11/2023 11:00 AM

## 2023-04-26 NOTE — DISCHARGE NOTE PROVIDER - NSDCMRMEDTOKEN_GEN_ALL_CORE_FT
Abilify 2 mg oral tablet: 1 orally once a day (at bedtime)  amLODIPine 10 mg oral tablet: 1 orally once a day  famotidine 40 mg oral tablet: 1 orally 2 times a day  gabapentin 100 mg oral capsule: 2 orally 3 times a day  glimepiride 4 mg oral tablet: 1 orally once a day  irbesartan 75 mg oral tablet: 1 orally  levothyroxine 150 mcg (0.15 mg) oral capsule: 1 orally once a day  Lipitor 40 mg oral tablet: 1 orally once a day (at bedtime)  Luvox 100 mg oral tablet: 1 orally once a day  metFORMIN 1000 mg oral tablet: 1 orally 2 times a day  MiraLax oral powder for reconstitution: 17 gram(s) orally once a day   omeprazole 40 mg oral delayed release capsule: 1 orally once a day  Topamax 100 mg oral tablet: 1 orally once a day as needed for  headache  Wellbutrin 100 mg oral tablet: 1 orally 2 times a day   Abilify 2 mg oral tablet: 1 orally once a day (at bedtime)  amLODIPine 10 mg oral tablet: 1 orally once a day  famotidine 40 mg oral tablet: 1 orally 2 times a day  gabapentin 100 mg oral capsule: 2 orally 3 times a day  glimepiride 4 mg oral tablet: 1 orally once a day  irbesartan 75 mg oral tablet: 1 orally  levothyroxine 150 mcg (0.15 mg) oral capsule: 1 orally once a day  Lipitor 40 mg oral tablet: 1 orally once a day (at bedtime)  Luvox 100 mg oral tablet: 1 orally once a day  metFORMIN 1000 mg oral tablet: 1 orally 2 times a day  MiraLax oral powder for reconstitution: 17 gram(s) orally once a day   omeprazole 40 mg oral delayed release capsule: 1 orally once a day  oxyCODONE 10 mg oral tablet: 1 tab(s) orally every 6 hours As needed Severe Pain (7 - 10)  oxyCODONE 5 mg oral tablet: 1 tab(s) orally every 6 hours As needed Moderate Pain (4 - 6)  Topamax 100 mg oral tablet: 1 orally once a day as needed for  headache  Tylenol 500 mg oral tablet: 2 orally every 8 hours as needed for  moderate pain  Wellbutrin 100 mg oral tablet: 1 orally 2 times a day

## 2023-04-27 ENCOUNTER — TRANSCRIPTION ENCOUNTER (OUTPATIENT)
Age: 53
End: 2023-04-27

## 2023-04-27 VITALS
DIASTOLIC BLOOD PRESSURE: 78 MMHG | SYSTOLIC BLOOD PRESSURE: 122 MMHG | HEART RATE: 76 BPM | TEMPERATURE: 98 F | RESPIRATION RATE: 18 BRPM | OXYGEN SATURATION: 96 %

## 2023-04-27 LAB
ANION GAP SERPL CALC-SCNC: 12 MMOL/L — SIGNIFICANT CHANGE UP (ref 5–17)
BUN SERPL-MCNC: 17.8 MG/DL — SIGNIFICANT CHANGE UP (ref 8–20)
CALCIUM SERPL-MCNC: 8.8 MG/DL — SIGNIFICANT CHANGE UP (ref 8.4–10.5)
CHLORIDE SERPL-SCNC: 106 MMOL/L — SIGNIFICANT CHANGE UP (ref 96–108)
CO2 SERPL-SCNC: 24 MMOL/L — SIGNIFICANT CHANGE UP (ref 22–29)
CREAT SERPL-MCNC: 1.11 MG/DL — SIGNIFICANT CHANGE UP (ref 0.5–1.3)
EGFR: 59 ML/MIN/1.73M2 — LOW
GLUCOSE BLDC GLUCOMTR-MCNC: 118 MG/DL — HIGH (ref 70–99)
GLUCOSE SERPL-MCNC: 119 MG/DL — HIGH (ref 70–99)
HCT VFR BLD CALC: 33.9 % — LOW (ref 34.5–45)
HGB BLD-MCNC: 11.1 G/DL — LOW (ref 11.5–15.5)
MAGNESIUM SERPL-MCNC: 2 MG/DL — SIGNIFICANT CHANGE UP (ref 1.8–2.6)
MCHC RBC-ENTMCNC: 29.6 PG — SIGNIFICANT CHANGE UP (ref 27–34)
MCHC RBC-ENTMCNC: 32.7 GM/DL — SIGNIFICANT CHANGE UP (ref 32–36)
MCV RBC AUTO: 90.4 FL — SIGNIFICANT CHANGE UP (ref 80–100)
PLATELET # BLD AUTO: 147 K/UL — LOW (ref 150–400)
POTASSIUM SERPL-MCNC: 3.9 MMOL/L — SIGNIFICANT CHANGE UP (ref 3.5–5.3)
POTASSIUM SERPL-SCNC: 3.9 MMOL/L — SIGNIFICANT CHANGE UP (ref 3.5–5.3)
RBC # BLD: 3.75 M/UL — LOW (ref 3.8–5.2)
RBC # FLD: 12.7 % — SIGNIFICANT CHANGE UP (ref 10.3–14.5)
SODIUM SERPL-SCNC: 142 MMOL/L — SIGNIFICANT CHANGE UP (ref 135–145)
WBC # BLD: 7.17 K/UL — SIGNIFICANT CHANGE UP (ref 3.8–10.5)
WBC # FLD AUTO: 7.17 K/UL — SIGNIFICANT CHANGE UP (ref 3.8–10.5)

## 2023-04-27 PROCEDURE — 82962 GLUCOSE BLOOD TEST: CPT

## 2023-04-27 PROCEDURE — 88302 TISSUE EXAM BY PATHOLOGIST: CPT

## 2023-04-27 PROCEDURE — 71250 CT THORAX DX C-: CPT

## 2023-04-27 PROCEDURE — 99024 POSTOP FOLLOW-UP VISIT: CPT

## 2023-04-27 PROCEDURE — 71045 X-RAY EXAM CHEST 1 VIEW: CPT

## 2023-04-27 PROCEDURE — 84100 ASSAY OF PHOSPHORUS: CPT

## 2023-04-27 PROCEDURE — 83735 ASSAY OF MAGNESIUM: CPT

## 2023-04-27 PROCEDURE — 71045 X-RAY EXAM CHEST 1 VIEW: CPT | Mod: 26

## 2023-04-27 PROCEDURE — S2900: CPT

## 2023-04-27 PROCEDURE — C9399: CPT

## 2023-04-27 PROCEDURE — 36415 COLL VENOUS BLD VENIPUNCTURE: CPT

## 2023-04-27 PROCEDURE — 85025 COMPLETE CBC W/AUTO DIFF WBC: CPT

## 2023-04-27 PROCEDURE — C1889: CPT

## 2023-04-27 PROCEDURE — 85027 COMPLETE CBC AUTOMATED: CPT

## 2023-04-27 PROCEDURE — 80048 BASIC METABOLIC PNL TOTAL CA: CPT

## 2023-04-27 RX ORDER — OXYCODONE HYDROCHLORIDE 5 MG/1
1 TABLET ORAL
Qty: 20 | Refills: 0
Start: 2023-04-27 | End: 2023-05-01

## 2023-04-27 RX ORDER — OXYCODONE HYDROCHLORIDE 5 MG/1
1 TABLET ORAL
Qty: 0 | Refills: 0 | DISCHARGE
Start: 2023-04-27

## 2023-04-27 RX ORDER — NALOXONE HYDROCHLORIDE 4 MG/.1ML
4 SPRAY NASAL
Qty: 1 | Refills: 0
Start: 2023-04-27 | End: 2023-04-27

## 2023-04-27 RX ADMIN — Medication 150 MICROGRAM(S): at 05:53

## 2023-04-27 RX ADMIN — HEPARIN SODIUM 5000 UNIT(S): 5000 INJECTION INTRAVENOUS; SUBCUTANEOUS at 05:53

## 2023-04-27 RX ADMIN — BUPROPION HYDROCHLORIDE 100 MILLIGRAM(S): 150 TABLET, EXTENDED RELEASE ORAL at 05:54

## 2023-04-27 RX ADMIN — PANTOPRAZOLE SODIUM 40 MILLIGRAM(S): 20 TABLET, DELAYED RELEASE ORAL at 05:53

## 2023-04-27 RX ADMIN — LOSARTAN POTASSIUM 25 MILLIGRAM(S): 100 TABLET, FILM COATED ORAL at 05:53

## 2023-04-27 RX ADMIN — GABAPENTIN 200 MILLIGRAM(S): 400 CAPSULE ORAL at 05:53

## 2023-04-27 RX ADMIN — ONDANSETRON 4 MILLIGRAM(S): 8 TABLET, FILM COATED ORAL at 05:53

## 2023-04-27 RX ADMIN — ARIPIPRAZOLE 2 MILLIGRAM(S): 15 TABLET ORAL at 00:47

## 2023-04-27 RX ADMIN — ONDANSETRON 4 MILLIGRAM(S): 8 TABLET, FILM COATED ORAL at 00:46

## 2023-04-27 RX ADMIN — AMLODIPINE BESYLATE 10 MILLIGRAM(S): 2.5 TABLET ORAL at 05:53

## 2023-04-27 NOTE — PROGRESS NOTE ADULT - PROBLEM SELECTOR PLAN 2
restart home-meds when diet advanced
Continue home medications (Norvasc and Losartan) for BP control.
Continue home medications (Norvasc and Losartan) for BP control.

## 2023-04-27 NOTE — PROGRESS NOTE ADULT - ASSESSMENT
53 year old female with PMH of HTN, HLD, diabetes, depression, severe LORA (CPAP), OCD, c/o severe GERD Hx of Hiatal Hernia, She has been on PPI therapy for many years and remains with reflux. She coughs and dry heaves daily for years and feels it is part of her reflux. She has attempted low acid diet and has had no positive relief. She noticed hoarseness /  change in her voice over the years. On 4/24/23 underwent Upper Endoscopy, Robotic assisted Hiatal Hernia Repair with Toupet Fundoplication by Dr. Lr.
53 year old female with PMH of HTN, HLD, diabetes, depression, severe LORA (CPAP), OCD, c/o severe GERD with a known Hiatal Hernia, experiencing dry cough, dry heaving daily, hoarseness, change in her voice, despite low acid diet and PPI therapy. On 4/24/23 patient underwent Upper Endoscopy, Robotic assisted Hiatal Hernia Repair with Toupet Fundoplication by Dr. Lr. Postoperative course remains uneventful at this time. CT esophagram negative for esophageal leak. Tolerating liquid diet. 
gradual onset
53 year old female with PMH of HTN, HLD, diabetes, depression, severe LORA (CPAP), OCD, c/o severe GERD with a known Hiatal Hernia, experiencing dry cough, dry heaving daily, hoarseness, change in her voice, despite low acid diet and PPI therapy. On 4/24/23 patient underwent Upper Endoscopy, Robotic assisted Hiatal Hernia Repair with Toupet Fundoplication by Dr. Lr. Postoperative course remains uneventful at this time. CT esophagram negative for esophageal leak. Tolerating liquid diet.

## 2023-04-27 NOTE — DISCHARGE NOTE NURSING/CASE MANAGEMENT/SOCIAL WORK - NSDCFUADDAPPT_GEN_ALL_CORE_FT
FOLLOW UP WITH DR SCHWAB ON 5/11 at 11 AM in our office.       Please follow up with your primary care dr within the next two weeks.  Please alert them of your recent procedure so they can update your medical record accordingly.

## 2023-04-27 NOTE — PROGRESS NOTE ADULT - PROBLEM SELECTOR PLAN 3
Continue home medications (Lipitor).
Restart home-meds when diet advanced
Continue home medications (Lipitor).

## 2023-04-27 NOTE — PROGRESS NOTE ADULT - PROBLEM SELECTOR PLAN 6
Continue GI ppx with Protonix   DVT ppx with SCD boots  Postop ABX prophylaxis
Heparin subcutaneously and SCDs for DVT prophylaxis.  Protonix for GI prophylaxis.    Dispo: Home  Plan to be discussed / reviewed with Thoracic Surgery attending / team during AM rounds.
Heparin subcutaneously and SCDs for DVT prophylaxis.  Protonix for GI prophylaxis.    Dispo: Home  Plan to be discussed / reviewed with Thoracic Surgery attending / team during AM rounds.

## 2023-04-27 NOTE — PROGRESS NOTE ADULT - PROBLEM SELECTOR PROBLEM 1
Gastro-esophageal reflux disease without esophagitis

## 2023-04-27 NOTE — DISCHARGE NOTE NURSING/CASE MANAGEMENT/SOCIAL WORK - NSDCPEFALRISK_GEN_ALL_CORE
For information on Fall & Injury Prevention, visit: [email or web address] OR  [email or web address] OR  https://www.cdc.gov/steadi/patient.html

## 2023-04-27 NOTE — PROGRESS NOTE ADULT - PROBLEM SELECTOR PLAN 5
Continue home medications (Luvox, Wellbutrin, and Abilify).
Restart home-meds when diet advanced
Continue home medications (Luvox, Wellbutrin, and Abilify).

## 2023-04-27 NOTE — PROGRESS NOTE ADULT - PROBLEM SELECTOR PLAN 1
Patient underwent Upper Endoscopy, Robotic assisted Hiatal Hernia Repair with Toupet Fundoplication by Dr. Lr 4/24/23.   Postop CT esophagram negative for esophageal leak.   Tolerating liquid diet.   Continue Zofran.  Continue GI prophylaxis with Protonix.  Plan to be discussed / reviewed with Thoracic Surgery attending / team during AM rounds.
4/24/23 underwent Upper Endoscopy, Robotic assisted Hiatal Hernia Repair with Toupet Fundoplication by Dr. Be Couch. standing Zofran   Cont. GI prophylaxis with protonix  NPO with sips/ice chips  Ordered for CT esophagram in AM, can advance diet to full liquids if scan is negative for leaks
Patient underwent Upper Endoscopy, Robotic assisted Hiatal Hernia Repair with Toupet Fundoplication by Dr. Lr 4/24/23.   Postop CT esophagram negative for esophageal leak.   Tolerating liquid diet.   Continue Zofran.  Continue GI prophylaxis with Protonix.  Plan to be discussed / reviewed with Thoracic Surgery attending / team during AM rounds.

## 2023-04-27 NOTE — PROGRESS NOTE ADULT - SUBJECTIVE AND OBJECTIVE BOX
NATIVIDAD MORA  MRN#: [id number]  Subjective: The patient was in the CTICU, seen and evaluate on AM rounds with the entire multidisciplinary team.     OBJECTIVE:  ICU Vital Signs Last 24 Hrs  T(C): 36.7 (25 Apr 2023 08:00), Max: 36.7 (25 Apr 2023 00:00)  T(F): 98.1 (25 Apr 2023 08:00), Max: 98.1 (25 Apr 2023 04:00)  HR: 89 (25 Apr 2023 10:00) (75 - 108)  BP: 131/70 (25 Apr 2023 10:00) (120/61 - 170/87)  BP(mean): 120 (25 Apr 2023 09:00) (83 - 120)  ABP: --  ABP(mean): --  RR: 20 (25 Apr 2023 10:00) (13 - 29)  SpO2: 96% (25 Apr 2023 10:00) (91% - 97%)    O2 Parameters below as of 25 Apr 2023 10:00  Patient On (Oxygen Delivery Method): nasal cannula  O2 Flow (L/min): 2    04-24 @ 07:01  -  04-25 @ 07:00  --------------------------------------------------------  IN: 1175 mL / OUT: 2950 mL / NET: -1775 mL    04-25 @ 07:01 - 04-25 @ 10:11  --------------------------------------------------------  IN: 400 mL / OUT: 0 mL / NET: 400 mL      PHYSICAL EXAM:Daily Height in cm: 162.56 (24 Apr 2023 11:45)    Daily   General: WN/WD NAD    HEENT:     + NCAT  + EOMI  - Conjuctival edema   - Icterus   - Thrush   - ETT  - NGT/OGT  Neck:         + FROM    + JVD     - Nodes     - Masses    + Mid-line trachea   - Tracheostomy  Chest:         - Sternal click  - Sternal drainage  - Pacing wires  - Chest tubes  - SubQ emphysema  Lungs:          + CTA   - Rhonchi    - Rales    - Wheezing     - Decreased BS   - Dullness R L  Cardiac:       + S1 + S2    + RRR   - Irregular   - S3  - S4    - Murmurs   - Rub   - Hamman’s sign   Abdomen:    + BS     + Soft    + Non-tender     - Distended    - Organomegaly  - PEG  Extremities:   - Cyanosis U/L   - Clubbing  U/L  - LE/UE Edema   + Capillary refill    + Pulses   Neuro:        + Awake   +  Alert   - Confused   - Lethargic   - Sedated   - Generalized Weakness  Skin:        - Rashes    - Erythema   + Normal incisions   + IV sites intact  - Sacral decubitus      MEDICATIONS  (STANDING):    heparin   Injectable 5000 Unit(s) SubCutaneous every 8 hours  insulin lispro (ADMELOG) corrective regimen sliding scale   SubCutaneous at bedtime  insulin lispro (ADMELOG) corrective regimen sliding scale   SubCutaneous three times a day before meals  lactated ringers. 1000 milliLiter(s) (75 mL/Hr) IV Continuous <Continuous>  levothyroxine Injectable 75 MICROGram(s) IV Push at bedtime  ondansetron Injectable 4 milliGRAM(s) IV Push every 6 hours  pantoprazole  Injectable 40 milliGRAM(s) IV Push daily    MEDICATIONS  (PRN):  dextrose Oral Gel 15 Gram(s) Oral once PRN Blood Glucose LESS THAN 70 milliGRAM(s)/deciliter  morphine  - Injectable 4 milliGRAM(s) IV Push every 4 hours PRN Severe Pain (7 - 10)  morphine  - Injectable 2 milliGRAM(s) IV Push every 4 hours PRN Moderate Pain (4 - 6)    LABS: All Lab data reviewed and analyzed                        12.4   11.07 )-----------( 198      ( 25 Apr 2023 02:15 )             36.5    04-25    140  |  106  |  12.0  ----------------------------<  186<H>  4.4   |  18.0<L>  |  1.10    Ca    9.0      25 Apr 2023 02:15  Phos  4.5     04-25  Mg     2.2     04-25    I independently reviewed CXR overnight from today 04-25-23 @ 10:11 and ruled out effusion, PTX, or collapse of lung     Assessment: Respiratory insufficieny and S/P hiatal hernia repair-fundoplication     Plan:   - Respiratory status required supplemental oxygen and the following of continuous pulse oximetry for support & to prevent decompensation  - Continued early mobilization as tolerated  - Patient requires deresuscitation for perioperative fluid administration and edema  - Addressed analgesic regimen to optimize function; Patiented required IV/parenteral narcotics  - SQ Heparin continued for VTE prophylaxis in addition to Venodyne boots  - Protonix maintained for GI bleeding prophylaxis  - CT esophagram did not reveal a leak therefore advanced diet  
CHIEF COMPLAINT: Incision pain at port sites     HPI:  53 year old female  with PMH of HTN, HLD, diabetes, depression, severe LORA (CPAP), OCD, here for PST reports severe GERD and Hiatal Hernia, She has been on PPI therapy for many years and remains with reflux. She coughs and dry heaves daily for years and feels it is part of her reflux. She has attempted low acid diet and has had no positive relief. She noticed hoarseness /  change in her voice over the years, she is now S/P Upper Endoscopy, Robotic assisted Hiatal Hernia Repair with Fundoplication transferred to CTICU for cardiopulmonary monitoring.    PAST MEDICAL & SURGICAL HISTORY:  Hypertension  High cholesterol  Diabetes mellitus  Major depression  Stage 1 chronic kidney disease  Obstructive sleep apnea  History of OCD (obsessive compulsive disorder)  History of cholecystectomy  Sacral nerve stimulator present    FAMILY HISTORY:  FH: hypertension (Father, Mother)    FH: heart disease (Father)    Allergy to penicillin    OBJECTIVE:  Vital Signs Last 24 Hrs  T(C): 36.6 (24 Apr 2023 11:45), Max: 36.6 (24 Apr 2023 11:45)  T(F): 97.9 (24 Apr 2023 11:45), Max: 97.9 (24 Apr 2023 11:45)  HR: 93 (24 Apr 2023 16:30) (75 - 93)  BP: 152/70 (24 Apr 2023 16:30) (142/65 - 152/70)  BP(mean): 101 (24 Apr 2023 16:30) (101 - 101)  RR: 16 (24 Apr 2023 16:30) (16 - 16)  SpO2: 93% (24 Apr 2023 16:30) (93% - 97%)    Parameters below as of 24 Apr 2023 16:30  Patient On (Oxygen Delivery Method): nasal cannula  O2 Flow (L/min): 3 varying between NRB facemask    HOSPITAL MEDICATIONS:  acetaminophen   IVPB .. 1000 milliGRAM(s) IV Intermittent once  insulin lispro (ADMELOG) corrective regimen sliding scale   SubCutaneous at bedtime  insulin lispro (ADMELOG) corrective regimen sliding scale   SubCutaneous three times a day before meals  lactated ringers. 1000 milliLiter(s) IV Continuous <Continuous>  levothyroxine Injectable 75 MICROGram(s) IV Push at bedtime  morphine  - Injectable 4 milliGRAM(s) IV Push every 4 hours PRN  morphine  - Injectable 2 milliGRAM(s) IV Push every 4 hours PRN  ondansetron Injectable 4 milliGRAM(s) IV Push every 6 hours  pantoprazole  Injectable 40 milliGRAM(s) IV Push daily    PHYSICAL EXAM:Daily Height in cm: 162.56 (24 Apr 2023 11:45)    Daily   HEENT:     + NCAT  + EOMI  - Conjunctival edema   - Icterus   - Thrush   - ETT  - NGT/OGT  Neck:         + FROM    + JVD     - Nodes     - Masses    + Mid-line trachea   - Tracheostomy  Chest:         - Sternal click  - Sternal drainage  - Pacing wires  - Chest tubes  - SubQ emphysema  Lungs:          + CTA   - Rhonchi    - Rales    - Wheezing     - Decreased BS   - Dullness R L  Cardiac:       + S1 + S2    + RRR   - Irregular   - S3  - S4    - Murmurs   - Rub   - Hamman’s sign   Abdomen:    + BS     + Soft    + tenderness at port sites     - Distended    - Organomegaly  - PEG  Extremities:   - Cyanosis U/L   - Clubbing  U/L  - LE/UE Edema   + Capillary refill    + Pulses   Neuro:        + Awake   -  Alert   - Confused  +  Lethargic   - Sedated   - Generalized Weakness  Skin:        - Rashes    - Erythema   + Normal incisions   + IV sites intact  - Sacral decubitus    I personally reviewed the CXR from 4/24/2023 @ 3:48PM with no effusions PTX, infiltrates of other acute pathology noted    Assessment: Respiratory insufficiency, hypothyroidism, and S/p hiatal hernia repair/fundoplication    Plan:    - Respiratory status required varying levels of supplemental oxygen & the following of continuous pulse oximetry for support & to prevent decompensation  - Addressed analgesic regimen to optimize function; IV narcotics/Morphine ordered for pain control prn  - NPO except for iced chips later pending a CT esophagram in the AM  - Venodyne boots for VTE prophylaxis, no need for chemoprophylaxis at this time    - Protonix maintained for GI bleeding prophylaxis  - Parenteral Ondansetron around the clock to prevent nausea-vomitting   - Reviewed & addressed Insulin sliding scale regimen pending po intake and POC Glucose monitoring  - Parenteral levothyroxine pending po intake for hypothyroidism 
  POD #3 s/p EGD, Robotic Hiatal Hernia repair, and Toupet fundoplication    PAST MEDICAL & SURGICAL HISTORY:  Hypertension  High cholesterol  Diabetes mellitus  Major depression  Stage 1 chronic kidney disease  Obstructive sleep apnea  History of OCD (obsessive compulsive disorder)  History of cholecystectomy  Sacral nerve stimulator present    FAMILY HISTORY:  FH: hypertension (Father, Mother)  FH: heart disease (Father)    Brief Hospital Course: 53 year old female with PMH of HTN, HLD, diabetes, depression, severe LORA (CPAP), OCD, c/o severe GERD with a known Hiatal Hernia, experiencing dry cough, dry heaving daily, hoarseness, change in her voice, despite low acid diet and PPI therapy. On 4/24/23 patient underwent Upper Endoscopy, Robotic assisted Hiatal Hernia Repair with Toupet Fundoplication by Dr. Lr. Postoperative course remains uneventful at this time. CT esophagram negative for esophageal leak. Tolerating liquid diet.     Significant recent/past 24 hr events: No overnight events reported.    Subjective: Patient lying in bed in NAD. +Tolerating liquid diet. +Passing gas. +Pain currently controlled. Denies fevers, chills, lightheadedness, dizziness, HA, CP, palpitations, SOB, cough, abdominal pain, N/V, diarrhea, numbness/tingling in extremities, or any other acute complaints. ROS negative x 10 systems except as noted above.    MEDICATIONS  (STANDING):  amLODIPine   Tablet 10 milliGRAM(s) Oral daily  ARIPiprazole 2 milliGRAM(s) Oral at bedtime  atorvastatin 40 milliGRAM(s) Oral at bedtime  buPROPion . 100 milliGRAM(s) Oral two times a day  fluvoxaMINE 100 milliGRAM(s) Oral daily  gabapentin 200 milliGRAM(s) Oral three times a day  heparin   Injectable 5000 Unit(s) SubCutaneous every 8 hours  insulin lispro (ADMELOG) corrective regimen sliding scale   SubCutaneous three times a day before meals  insulin lispro (ADMELOG) corrective regimen sliding scale   SubCutaneous at bedtime  levothyroxine 150 MICROGram(s) Oral daily  losartan 25 milliGRAM(s) Oral daily  ondansetron Injectable 4 milliGRAM(s) IV Push every 6 hours  pantoprazole    Tablet 40 milliGRAM(s) Oral before breakfast    MEDICATIONS  (PRN):  oxyCODONE    IR 5 milliGRAM(s) Oral every 6 hours PRN Moderate Pain (4 - 6)  oxyCODONE    IR 10 milliGRAM(s) Oral every 6 hours PRN Severe Pain (7 - 10)    Allergies: penicillin (Unknown)    Vitals   T(C): 36.9 (27 Apr 2023 00:00), Max: 36.9 (26 Apr 2023 21:00)  T(F): 98.5 (27 Apr 2023 00:00), Max: 98.5 (27 Apr 2023 00:00)  HR: 89 (27 Apr 2023 00:00) (86 - 90)  BP: 135/89 (27 Apr 2023 00:00) (128/74 - 153/84)  RR: 18 (27 Apr 2023 00:00) (18 - 18)  SpO2: 90% (27 Apr 2023 00:00) (90% - 98%)  O2 Parameters below as of 26 Apr 2023 21:00  Patient On (Oxygen Delivery Method): room air    I&O's Detail    25 Apr 2023 07:01  -  26 Apr 2023 07:00  --------------------------------------------------------  IN:    IV PiggyBack: 100 mL    Lactated Ringers: 300 mL  Total IN: 400 mL    OUT:  Total OUT: 0 mL    Total NET: 400 mL      26 Apr 2023 07:01  -  27 Apr 2023 04:25  --------------------------------------------------------  IN:  Total IN: 0 mL    OUT:    Voided (mL): 870 mL  Total OUT: 870 mL    Total NET: -870 mL    Physical Exam  Neuro: A+O x 3, non-focal, speech clear and intact  HEENT:  NCAT, No conjuctival edema or icterus, no thrush.    Neck:  Supple, trachea midline  Pulm: CTA bilaterally, no accessory muscle use noted  CV: regular rate, regular rhythm, +S1S2, no murmur noted  Abd: soft, NT, ND, + BS, ?ventral hernia  Ext: KULKARNI x 4, no edema, no cyanosis, distal motor/neuro/circ intact  Skin: warm, dry, perfused  Incisions: abdominal laparoscopic incisions C/D/I    LABS    POCT Blood Glucose.: 124 mg/dL (04-26-23 @ 21:40)  POCT Blood Glucose.: 165 mg/dL (04-26-23 @ 18:15)  POCT Blood Glucose.: 154 mg/dL (04-26-23 @ 12:12)  POCT Blood Glucose.: 139 mg/dL (04-26-23 @ 07:29)      Last CXR:  < from: Xray Chest 1 View- PORTABLE-Urgent (04.24.23 @ 18:17) >  INTERPRETATION:  ET tube has been removed.  There is right lower lung linear atelectasis. The lungs are otherwise clear.  No pleural effusion or pneumothorax is seen.  IMPRESSION:  Right lower lung linear atelectasis. Otherwise clear lungs.  < end of copied text >    CT Chest:  < from: CT Chest w/ Oral Cont (04.25.23 @ 08:23) >  CT scan of the chest was obtained following administration of only oral contrast.  No hilar and or mediastinal adenopathy is noted.  Heart is normal in size. No pericardial effusion is noted.   Pneumomediastinum is noted.  Patient is status post hiatal hernia repair. Oral contrast is noted within the distal esophagus. No extravasation of oral contrast into the mediastinum is noted.  No endobronchial lesions are noted. Linear opacities representing atelectasis are scattered within both lungs. No pleural effusions are noted.  Below the diaphragm, visualized portions of the abdomen demonstrate pneumoperitoneum. Oral contrast is noted within the stomach. Patient is status post cholecystectomy.  Degenerative changes of the spine are noted.  IMPRESSION: Status post hiatal hernia repair. There is no extravasation of oral contrast from the esophagus into the mediastinum.  < end of copied text >
  POD #2 s/p EGD, Robotic Hiatal Hernia repair, and Toupet fundoplication    PAST MEDICAL & SURGICAL HISTORY:  Hypertension  High cholesterol  Diabetes mellitus  Major depression  Stage 1 chronic kidney disease  Obstructive sleep apnea  History of OCD (obsessive compulsive disorder)  History of cholecystectomy  Sacral nerve stimulator present    FAMILY HISTORY:  FH: hypertension (Father, Mother)  FH: heart disease (Father)    Brief Hospital Course: 53 year old female with PMH of HTN, HLD, diabetes, depression, severe LORA (CPAP), OCD, c/o severe GERD with a known Hiatal Hernia, experiencing dry cough, dry heaving daily, hoarseness, change in her voice, despite low acid diet and PPI therapy. On 4/24/23 patient underwent Upper Endoscopy, Robotic assisted Hiatal Hernia Repair with Toupet Fundoplication by Dr. Lr. Postoperative course remains uneventful at this time. CT esophagram negative for esophageal leak. Tolerating liquid diet.     Significant recent/past 24 hr events: No overnight events reported.    Subjective: Patient lying in bed in NAD. +Tolerating liquid diet. +Passing gas. +Pain currently controlled. Denies fevers, chills, lightheadedness, dizziness, HA, CP, palpitations, SOB, cough, abdominal pain, N/V, diarrhea, numbness/tingling in extremities, or any other acute complaints. ROS negative x 10 systems except as noted above.    MEDICATIONS  (STANDING):  amLODIPine   Tablet 10 milliGRAM(s) Oral daily  ARIPiprazole 2 milliGRAM(s) Oral at bedtime  atorvastatin 40 milliGRAM(s) Oral at bedtime  buPROPion . 100 milliGRAM(s) Oral two times a day  fluvoxaMINE 100 milliGRAM(s) Oral daily  gabapentin 200 milliGRAM(s) Oral three times a day  heparin   Injectable 5000 Unit(s) SubCutaneous every 8 hours  insulin lispro (ADMELOG) corrective regimen sliding scale   SubCutaneous three times a day before meals  insulin lispro (ADMELOG) corrective regimen sliding scale   SubCutaneous at bedtime  levothyroxine 150 MICROGram(s) Oral daily  losartan 25 milliGRAM(s) Oral daily  ondansetron Injectable 4 milliGRAM(s) IV Push every 6 hours  pantoprazole    Tablet 40 milliGRAM(s) Oral before breakfast    MEDICATIONS  (PRN):  oxyCODONE    IR 5 milliGRAM(s) Oral every 6 hours PRN Moderate Pain (4 - 6)  oxyCODONE    IR 10 milliGRAM(s) Oral every 6 hours PRN Severe Pain (7 - 10)    Allergies: penicillin (Unknown)    Vitals   T(C): 36.9 (26 Apr 2023 00:12), Max: 36.9 (25 Apr 2023 22:00)  T(F): 98.4 (26 Apr 2023 00:12), Max: 98.5 (25 Apr 2023 22:00)  HR: 97 (26 Apr 2023 00:12) (85 - 105)  BP: 134/72 (26 Apr 2023 00:12) (120/61 - 170/87)  BP(mean): 99 (25 Apr 2023 11:45) (83 - 120)  RR: 18 (26 Apr 2023 00:12) (13 - 22)  SpO2: 98% (26 Apr 2023 00:12) (91% - 98%)  O2 Parameters below as of 26 Apr 2023 00:12  Patient On (Oxygen Delivery Method): room air    I&O's Detail    24 Apr 2023 07:01  -  25 Apr 2023 07:00  --------------------------------------------------------  IN:    IV PiggyBack: 100 mL    IV PiggyBack: 100 mL    Lactated Ringers: 975 mL  Total IN: 1175 mL    OUT:    Voided (mL): 2950 mL  Total OUT: 2950 mL    Total NET: -1775 mL      25 Apr 2023 07:01  -  26 Apr 2023 04:53  --------------------------------------------------------  IN:    IV PiggyBack: 100 mL    Lactated Ringers: 300 mL  Total IN: 400 mL    OUT:  Total OUT: 0 mL    Total NET: 400 mL    Physical Exam  Neuro: A+O x 3, non-focal, speech clear and intact  HEENT:  NCAT, No conjuctival edema or icterus, no thrush.    Neck:  Supple, trachea midline  Pulm: CTA bilaterally, no accessory muscle use noted  CV: regular rate, regular rhythm, +S1S2, no murmur noted  Abd: soft, NT, ND, + BS, ?ventral hernia  Ext: KULKARNI x 4, no edema, no cyanosis, distal motor/neuro/circ intact  Skin: warm, dry, perfused  Incisions: abdominal laparoscopic incisions C/D/I    LABS                        12.4   11.07 )-----------( 198      ( 25 Apr 2023 02:15 )             36.5     04-25    140  |  106  |  12.0  ----------------------------<  186<H>  4.4   |  18.0<L>  |  1.10    Ca    9.0      25 Apr 2023 02:15  Phos  4.5     04-25  Mg     2.2     04-25    POCT Blood Glucose.: 110 mg/dL (04-25-23 @ 22:07)  POCT Blood Glucose.: 127 mg/dL (04-25-23 @ 17:10)  POCT Blood Glucose.: 144 mg/dL (04-25-23 @ 10:53)  POCT Blood Glucose.: 162 mg/dL (04-25-23 @ 07:17)      Last CXR:  < from: Xray Chest 1 View- PORTABLE-Urgent (04.24.23 @ 18:17) >  INTERPRETATION:  ET tube has been removed.  There is right lower lung linear atelectasis. The lungs are otherwise clear.  No pleural effusion or pneumothorax is seen.  IMPRESSION: Right lower lung linear atelectasis. Otherwise clear lungs.  < end of copied text >    CT Chest:  < from: CT Chest w/ Oral Cont (04.25.23 @ 08:23) >  IMPRESSION: Status post hiatal hernia repair. There is no extravasation of oral contrast from the esophagus into the mediastinum.  < end of copied text >  
53y Female seen and evaluated at bedside. Endorsing no acute complaints. Denies any chest pain, palpitations, shortness of breath, wheezing, abd pain, nausea, vomiting, lightheadedness, headaches, fevers, or chills.       PAST MEDICAL & SURGICAL HISTORY:  Hypertension      High cholesterol      Diabetes mellitus      Major depression      Stage 1 chronic kidney disease      Obstructive sleep apnea      History of OCD (obsessive compulsive disorder)      History of cholecystectomy      Sacral nerve stimulator present          Medications:  dextrose 5%. 1000 milliLiter(s) IV Continuous <Continuous>  dextrose 5%. 1000 milliLiter(s) IV Continuous <Continuous>  dextrose 50% Injectable 25 Gram(s) IV Push once  dextrose 50% Injectable 12.5 Gram(s) IV Push once  dextrose 50% Injectable 25 Gram(s) IV Push once  dextrose Oral Gel 15 Gram(s) Oral once PRN  glucagon  Injectable 1 milliGRAM(s) IntraMuscular once  heparin   Injectable 5000 Unit(s) SubCutaneous every 8 hours  insulin lispro (ADMELOG) corrective regimen sliding scale   SubCutaneous three times a day before meals  insulin lispro (ADMELOG) corrective regimen sliding scale   SubCutaneous at bedtime  lactated ringers. 1000 milliLiter(s) IV Continuous <Continuous>  levothyroxine Injectable 75 MICROGram(s) IV Push at bedtime  morphine  - Injectable 4 milliGRAM(s) IV Push every 4 hours PRN  morphine  - Injectable 2 milliGRAM(s) IV Push every 4 hours PRN  ondansetron Injectable 4 milliGRAM(s) IV Push every 6 hours  pantoprazole  Injectable 40 milliGRAM(s) IV Push daily      MEDICATIONS  (PRN):  dextrose Oral Gel 15 Gram(s) Oral once PRN Blood Glucose LESS THAN 70 milliGRAM(s)/deciliter  morphine  - Injectable 2 milliGRAM(s) IV Push every 4 hours PRN Moderate Pain (4 - 6)  morphine  - Injectable 4 milliGRAM(s) IV Push every 4 hours PRN Severe Pain (7 - 10)      Daily Review:    Height (cm): 162.6 (04-24 @ 11:45)  Weight (kg): 99.291 (04-24 @ 11:45)  BMI (kg/m2): 37.6 (04-24 @ 11:45)  BSA (m2): 2.03 (04-24 @ 11:45)                            11.7   11.08 )-----------( 185      ( 24 Apr 2023 17:25 )             35.1   04-24    142  |  106  |  14.8  ----------------------------<  177<H>  3.7   |  20.0<L>  |  1.21    Ca    9.7      24 Apr 2023 17:25  Phos  4.8     04-24  Mg     1.8     04-24            T(C): 36.7 (04-25-23 @ 00:00), Max: 36.7 (04-25-23 @ 00:00)  HR: 101 (04-25-23 @ 01:00) (75 - 108)  BP: 151/69 (04-25-23 @ 01:00) (136/71 - 166/70)  RR: 26 (04-25-23 @ 01:00) (16 - 29)  SpO2: 94% (04-25-23 @ 01:00) (91% - 97%)  Wt(kg): --    CAPILLARY BLOOD GLUCOSE      POCT Blood Glucose.: 181 mg/dL (24 Apr 2023 21:38)  POCT Blood Glucose.: 174 mg/dL (24 Apr 2023 13:32)  POCT Blood Glucose.: 139 mg/dL (24 Apr 2023 12:01)      I&O's Summary    24 Apr 2023 07:01  -  25 Apr 2023 01:18  --------------------------------------------------------  IN: 725 mL / OUT: 1800 mL / NET: -1075 mL        Physical Exam  General: Well appearing, laying in bed on an incline, NAD  Neurological: AOx3, no focal neurological deficits  Cardiovascular: Regular Rate/Rhythm, S1/2 auscultated, No extra heart sounds  Respiratory: CTA b/l over all lung fields, No adventitious breath sounds  Gastrointestinal: Bowel sounds present in all 4 quadrants, Abdomen is soft, non-tender, non-distended  Extremities: No peripheral edema noted, 5/5 strength and full range of motion in all 4 extremities b/l  Vascular: 2+ peripheral pulses b/l in upper and lower extremities, Radial, DP  Incision Sites: Laparoscopic incisions c/d/i, no erythema, no purulence

## 2023-04-27 NOTE — DISCHARGE NOTE NURSING/CASE MANAGEMENT/SOCIAL WORK - PATIENT PORTAL LINK FT
You can access the FollowMyHealth Patient Portal offered by [facility] by registering at the following website: [email or web address]. By joining [facility]’s FollowMyHealth portal, you will also be able to view your health information using other applications (apps) compatible with our system.

## 2023-04-27 NOTE — PROGRESS NOTE ADULT - PROBLEM SELECTOR PLAN 4
HA1c 6.2 on Metformin and Glimepiride at home. Holding oral agents while inpatient.   Continue fingersticks AC/HS with sliding scale insulin for BG coverage.
Cont. ISS
HA1c 6.2 on Metformin and Glimepiride at home. Holding oral agents while inpatient.   Continue fingersticks AC/HS with sliding scale insulin for BG coverage.

## 2023-05-02 LAB — SURGICAL PATHOLOGY STUDY: SIGNIFICANT CHANGE UP

## 2023-05-05 ENCOUNTER — APPOINTMENT (OUTPATIENT)
Dept: UROGYNECOLOGY | Facility: CLINIC | Age: 53
End: 2023-05-05
Payer: MEDICARE

## 2023-05-05 DIAGNOSIS — R35.1 NOCTURIA: ICD-10-CM

## 2023-05-05 PROCEDURE — 99213 OFFICE O/P EST LOW 20 MIN: CPT

## 2023-05-05 NOTE — HISTORY OF PRESENT ILLNESS
[FreeTextEntry1] : Patient is a 53-year-old multipara with mixed urinary incontinence (stress greater than urge), overactive bladder, nocturia, fecal incontinence and atrophic vaginitis. On office examination, there was evidence of some loss of urethral support but she had a negative empty bladder supine cough stress test, normal postvoid residual, and fair levator ani awareness/contraction. She has history of recurrent UTI. She admits to consumption of a few bladder irritants. Other potential contributing factors include class II obesity, intake of anti-hypertensive, sleep apnea and diabetes.  Patient underwent urodynamic testing on 6/29/2022 which showed increased sensation, normal cystometric capacity of 430 cc, normal compliance, absence of detrusor overactivity, presence of stress urinary incontinence and absence of voiding dysfunction.\par Patient was last seen in July 2022.  She decided to proceed with placement of sacral neuromodulator by Dr. Schulz as she was most bothered by the fecal incontinence. \[facility]  Patient presents today to discuss findings of her recent CT scan and urine cytology.  Patient states that Dr. Felicia Chavez ordered these tests because patient was having lower abdominal discomfort.  He is concerned about the finding of incompletely distended bladder on the CT scan.  She also wants me to review her urine cytology\par Her urine cytology from 4/3/2023 was negative for high-grade urothelial carcinoma.  Squamous cells, rare benign urothelial cells and crystals were noted.\par She had a CT scan with oral and IV contrast on 2/25/2023 which showed normal kidneys, normal ureters, incompletely distended bladder and bladder, moderate fecal load without diverticulitis.  Relative rest of the scan was also normal.\par Patient reports resolution of her urinary urgency.  She notices urine leakage when she sneezes or coughs.  She reports undergoing laparoscopic abdominal hernia repair a week ago.  She states she has been coughing less and does not note that her urine leakage as much.  She wakes up twice at night.  She has sleep apnea.  She endorses compliance with use of CPAP machine.  Her fecal incontinence has completely resolved since undergoing the sacral neuromodulator.  She denies dysuria.  She denies episode of gross hematuria.  Her recent urine analysis and urine culture from 3/24/2023 are within normal limits.\par Patient presents with her mother today

## 2023-05-05 NOTE — ASSESSMENT
[FreeTextEntry1] : Patient with history of overactive bladder, mixed urinary incontinence, nocturia and fecal incontinence who reports resolution of her overactive bladder symptoms except nocturia and fecal incontinence since undergoing sacral neuromodulation.  She however continues to have bothersome stress urinary incontinence but she is scared of undergoing surgery she is interested in nonsurgical options.  Potential contributing factors include class III obesity

## 2023-05-05 NOTE — DISCUSSION/SUMMARY
[FreeTextEntry1] : I reviewed the findings of urine cytology and reassured patient that there is no evidence of cancer in her urine cytology.  I discussed the findings of presence of crystals.  Discussed potential risk of developing kidney stones.  She drinks large volumes of flavored water, tea and other bladder irritants.  Encouraged her to improve her water intake and avoid consumption of bladder irritant irritants.  Also reassured her  regarding the finding of incompletely distended bladder .\par We also reviewed management options for stress urinary incontinence.  Discussed weight loss, supervised pelvic floor therapy, pessary, sling procedure etc. after detailed discussion, she is interested in a trial of pessary fitting.  I provided her verbal and written information regarding pessaries.  She will make the appointment in [place] office at her current convenience.  All questions answered to her satisfaction.\par

## 2023-05-11 ENCOUNTER — APPOINTMENT (OUTPATIENT)
Dept: THORACIC SURGERY | Facility: CLINIC | Age: 53
End: 2023-05-11
Payer: MEDICARE

## 2023-05-11 VITALS
OXYGEN SATURATION: 97 % | TEMPERATURE: 97.6 F | BODY MASS INDEX: 36.37 KG/M2 | HEIGHT: 64 IN | RESPIRATION RATE: 17 BRPM | WEIGHT: 213 LBS | HEART RATE: 74 BPM | DIASTOLIC BLOOD PRESSURE: 85 MMHG | SYSTOLIC BLOOD PRESSURE: 131 MMHG

## 2023-05-11 PROCEDURE — 99024 POSTOP FOLLOW-UP VISIT: CPT

## 2023-05-11 RX ORDER — ONDANSETRON 4 MG/1
4 TABLET ORAL EVERY 6 HOURS
Qty: 120 | Refills: 2 | Status: COMPLETED | COMMUNITY
Start: 2021-03-11 | End: 2023-05-11

## 2023-05-11 RX ORDER — ACETAMINOPHEN 325 MG/1
TABLET, FILM COATED ORAL
Refills: 0 | Status: ACTIVE | COMMUNITY

## 2023-05-11 NOTE — REASON FOR VISIT
[Parent] : parent [de-identified] : Upper endoscopy with robot-assisted hiatal hernia repair with Toupet  fundoplication. [de-identified] : 4/24/2023 [de-identified] : 53 year old female with PMH of HTN, HLD, diabetes, depression, severe LORA (CPAP), OCD, c/o severe GERD with a known Hiatal Hernia, experiencing dry cough, dry heaving daily, hoarseness, change in her voice, despite low acid diet and PPI therapy. On 4/24/23 patient underwent Upper Endoscopy, Robotic assisted Hiatal Hernia Repair with Toupet Fundoplication by Dr. Lr. Postoperative course remained uneventful. CT esophagram negative for esophageal leak. Tolerating liquid diet. Discharged to home 4/27/2023\par \par Today she reports persistent incisional pain and back pain. She does have some swallowing issues with food but mostly her pills. The food feels like it gets stuck in her chest. She has vomited twice with some nausea, both times after pills and eating. She felt that maybe she ate too much at that time. She is also constipated and has been taking miralax and colace. She has had chills the past few days but no fever. She has lost 6-7 lbs since surgery. She doesn't have trouble sleeping at night but does feel fatigued during the day. She also has some sharp chest pain which is all the time and both sides equally across the chest. Over the weekend she called the service with complaints of redness to incision and was started on an antibiotic, which has reduced the redness.\par

## 2023-05-11 NOTE — ASSESSMENT
[FreeTextEntry1] : I had the pleasure of evaluating Ms. MORA in the office today for postoperative follow up.\par \par Briefly, this is a 53 year old female who had a robot assisted hiatal hernia repair with Toupet fundoplication on 4/24/2023. She had an uneventful postoperative course initially. She is continuing the antibiotics for incisional erythema and complaints of postoperative pain as well as some food getting stuck, nausea and vomiting.\par \par Today we discussed postoperative expectations and progress. Healing from this procedure is different for everyone. I feel Ms. MORA is making satisfactory slow progress. I am confident in time she will continue to improve.\par \par I counseled the patient on diet, activity and habits that may also impact healing. I encouraged her to cut food small, chew well and eat small amounts at a time. I would like to monitor her progress closely. All questions answered and concerns addressed.\par \par Plan\par - Patient may drive\par - Return to care in 2 weeks for continued clinical evaluation and monitoring.\par - The patient was reminded to call if any concerns or issues arise.\par - The patient expressed understanding of and agreement to the plan moving forward\par \par I, Dr. Rolando Lr, personally performed the evaluation and management (E/M) services for this established patient who presents today with an existing condition.  That E/M includes conducting the examination, assessing all new/exacerbated conditions, and establishing a new plan of care.  Today, Austin Clements PA-C, was here to observe my evaluation and management services for this new problem/exacerbated condition to be followed going forward.\par \par

## 2023-05-11 NOTE — PHYSICAL EXAM
[] : no respiratory distress [Respiration, Rhythm And Depth] : normal respiratory rhythm and effort [Auscultation Breath Sounds / Voice Sounds] : lungs were clear to auscultation bilaterally [Heart Rate And Rhythm] : heart rate was normal and rhythm regular [Heart Sounds] : normal S1 and S2 [Site: ___] : Site: [unfilled] [No Edema] : no edema [FreeTextEntry1] : 5/6 c/d/i and healing well. Some firm scar tissue underlying midline scar with additional ecchymosis. RIght side port site  at the skin and mildly erythematous.

## 2023-05-18 ENCOUNTER — APPOINTMENT (OUTPATIENT)
Dept: UROGYNECOLOGY | Facility: CLINIC | Age: 53
End: 2023-05-18
Payer: MEDICARE

## 2023-05-18 PROCEDURE — 57160 INSERT PESSARY/OTHER DEVICE: CPT

## 2023-05-18 PROCEDURE — A4562: CPT

## 2023-05-18 NOTE — DISCUSSION/SUMMARY
[FreeTextEntry1] : Patient tolerated the pessary well today.  Her cough stress test was negative in the standing position.  She typically would have repeat in standing position with coughing.  Patient ambulated and voided without difficulty after the pessary placement.\par Potential new symptoms in the next few days following pessary fitting were reviewed with the patient including vaginal spotting, increased vaginal discharge, vaginal pressure, potential  falling out of the pessary etc.\par Follow-up in 2 weeks

## 2023-05-18 NOTE — ASSESSMENT
[FreeTextEntry1] : Patient with history of overactive bladder, mixed urinary incontinence, nocturia and fecal incontinence who reports resolution of her overactive bladder symptoms except nocturia and fecal incontinence since undergoing sacral neuromodulation. She however continues to have bothersome stress urinary incontinence but she is scared of undergoing surgery she is interested in nonsurgical options. Potential contributing factors include class III obesity. \par Patient was fitted with 2 1/4 inch incontinence ring with support pessary today.  Patient tolerated the pessary well

## 2023-05-18 NOTE — HISTORY OF PRESENT ILLNESS
[FreeTextEntry1] : Patient is a 53-year-old nulligravida with history of overactive bladder, mixed urinary incontinence, nocturia and fecal incontinence who reports resolution of her overactive bladder symptoms except nocturia and fecal incontinence since undergoing sacral neuromodulation. She however continues to have bothersome stress urinary incontinence but she is scared of undergoing surgery.  She is interested in nonsurgical options. Potential contributing factors include class III obesity. \par Patient presents today for trial of pessary.  She has no new symptoms today.

## 2023-05-18 NOTE — PHYSICAL EXAM
[Chaperone Present] : A chaperone was present in the examining room during all aspects of the physical examination [FreeTextEntry1] : General: Not in acute distress, alert and oriented x3.\par Pelvic Exam: Normal external female genitalia. Urethra is  hypermobile without prolapse, exudates, or lesions. Cough stress test is negative .  Narrow genital hiatus.  Patient was fitted with 2 1/4 inch incontinence ring with support pessary.  I could still fit a finger between the walls and the pessary. Cough stress test was negative with the pessary. The pessary stayed with Valsalva in standing position.  Cough stress test was negative in standing position with the pessary in place

## 2023-05-24 NOTE — CDI QUERY NOTE - NSCDIOTHERTXTBX_GEN_ALL_CORE_HH
Respiratory insufficiency was noted in critical care, please clarify the present on admission (POA) status of this condition.  A.	Post operative respiratory insufficiency   B.	Respiratory insufficiency present on admission  C.	Other, please specify      Supporting documentation:      Vital Signs Adult [Charted Location: [place] [address]] [Authored: 24-Apr-2023 16:30]  Respiratory/Pulse Oximetry/Oxygen Therapy  Respiration Rate (breaths/min) Respiration Rate (breaths/min): 16 /min  SpO2 (%) SpO2 (%): 93 %  O2 Delivery/Oxygen Delivery Method Patient On (Oxygen Delivery Method): nasal cannula  Oxygen Therapy Flow (L/min) Oxygen Flow (L/min): 3 L/min      Vital Signs Adult [Charted Location: [place] [address]] [Authored: 24-Apr-2023 17:00]  Respiratory/Pulse Oximetry/Oxygen Therapy  Respiration Rate (breaths/min) Respiration Rate (breaths/min): 17 /min  SpO2 (%) SpO2 (%): 93 %  O2 Delivery/Oxygen Delivery Method Patient On (Oxygen Delivery Method): nasal cannula  Oxygen Therapy Flow (L/min) Oxygen Flow (L/min): 5 L/min      Vital Signs Adult [Charted Location: [place] [address]] [Authored: 24-Apr-2023 18:00]  Respiratory/Pulse Oximetry/Oxygen Therapy  Respiration Rate (breaths/min) Respiration Rate (breaths/min): 19 /min  SpO2 (%) SpO2 (%): 91 %  O2 Delivery/Oxygen Delivery Method Patient On (Oxygen Delivery Method): nasal cannula  Oxygen Therapy Flow (L/min) Oxygen Flow (L/min): 5 L/min            Progress Note Adult-Critical Care Attending [Charted Location: [place] [address]] [Authored: 24-Apr-2023 16:47]  Assessment: Respiratory insufficiency, hypothyroidism, and S/p hiatal hernia repair/fundoplication    Plan:    - Respiratory status required varying levels of supplemental oxygen & the following of continuous pulse oximetry for support & to prevent decompensation  - Addressed analgesic regimen to optimize function; IV narcotics/Morphine ordered for pain control prn        Chart Note-Off Service Note Cla PA [Charted Location: [id number]] [Authored: 24-May-2023 13:50]  • Note Type	Off Service Note  Clarification      Patient required 5 Liters nasal canula post op and was weaned off before discharge.  Patient did not have acute respiratory failure.

## 2023-05-25 ENCOUNTER — APPOINTMENT (OUTPATIENT)
Dept: THORACIC SURGERY | Facility: CLINIC | Age: 53
End: 2023-05-25
Payer: MEDICARE

## 2023-05-25 VITALS
HEART RATE: 75 BPM | HEIGHT: 64 IN | DIASTOLIC BLOOD PRESSURE: 87 MMHG | SYSTOLIC BLOOD PRESSURE: 155 MMHG | RESPIRATION RATE: 16 BRPM | TEMPERATURE: 98.3 F | BODY MASS INDEX: 36.7 KG/M2 | WEIGHT: 215 LBS | OXYGEN SATURATION: 98 %

## 2023-05-25 PROCEDURE — 99024 POSTOP FOLLOW-UP VISIT: CPT

## 2023-05-25 RX ORDER — DOXYCYCLINE HYCLATE 100 MG/1
100 TABLET ORAL
Refills: 0 | Status: COMPLETED | COMMUNITY
Start: 2023-05-11 | End: 2023-05-25

## 2023-05-25 RX ORDER — BUPROPION HYDROCHLORIDE 100 MG/1
100 TABLET, FILM COATED ORAL
Refills: 0 | Status: ACTIVE | COMMUNITY

## 2023-05-25 RX ORDER — FLUTICASONE PROPIONATE AND SALMETEROL 100; 50 UG/1; UG/1
100-50 POWDER RESPIRATORY (INHALATION)
Qty: 60 | Refills: 0 | Status: COMPLETED | COMMUNITY
Start: 2022-09-17 | End: 2023-05-25

## 2023-05-26 NOTE — ASSESSMENT
[FreeTextEntry1] : Ms Freitas presents for her second post operative appointment. Today she reports abdominal soreness that worsens after eating and has had two episodes of dry heaving over the past two days. She continues to experience some dysphagia, however, it is significantly improving. On exam, some firm scar tissue underlying midline scar and separation of right abdominal incision still remain. We discussed that Ms Freitas continue to wash all abdominal incisions with soap and water daily and keep open to air.\par \par We discussed postoperative expectations and progress. Healing from this procedure is different for everyone. I feel Ms. FREITAS is making good progress. I am confident in time she will continue to improve.\par \par I counseled Ms Freitas on diet, activity and habits that may also impact healing, including the importance of ambulation. I encouraged her to cut food small, chew well and eat small amounts at a time; we discussed that she discontinue the Pepcid and Omeprazole. I would like to monitor her progress closely and recommend that she return to care in office in two weeks for evaluation. All questions answered, patient verbalizes understanding. \par \par  \par \par PLAN:\par - Discontinue Pepcid and Omeprazole.\par - Return to care in the office in 2 weeks for clinical evaluation and monitoring.\par \par \par \par I, Dr. Lr, personally performed the evaluation and management (E/M) services for this established patient who presents today with an existing condition(s).  That E/M includes conducting the examination, assessing all new/exacerbated conditions, and establishing a new plan of care.  Today, my ACP, Lina Vail NP, was here to observe my evaluation and management services for this new problem/exacerbated condition to be followed going forward.

## 2023-05-26 NOTE — REASON FOR VISIT
[de-identified] : Upper endoscopy with robot-assisted hiatal hernia repair with Toupet  fundoplication. [de-identified] : 4/24/2023 [de-identified] : \par Postoperative course uneventful. CT esophagram negative for esophageal leak. Tolerating liquid diet. Discharged to home on 4/27/2023.\par \par This is her second postoperative visit. Today she reports abdominal "soreness" that worsens after eating and has had two episodes of dry heaving over the past two days.  She continues to experience some dysphagia, however, it has significantly improved since last office visit.

## 2023-05-26 NOTE — PHYSICAL EXAM
[] : no respiratory distress [Respiration, Rhythm And Depth] : normal respiratory rhythm and effort [Auscultation Breath Sounds / Voice Sounds] : lungs were clear to auscultation bilaterally [Heart Rate And Rhythm] : heart rate was normal and rhythm regular [Heart Sounds] : normal S1 and S2 [Site: ___] : Site: [unfilled] [FreeTextEntry1] : Some firm scar tissue underlying midline scar, no ecchymosis or erythema noted. Right abdominal incision  at the skin (1cm x 0.5cm), no erythema noted.  [No Edema] : no edema

## 2023-05-27 NOTE — CHART NOTE - NSCHARTNOTEFT_GEN_A_CORE
Patient required 5 Liters nasal canula post op and was weaned off before discharge.  Patient did not have acute respiratory failure.
Respiratory insufficiency was noted in a critical care note on this admission,  This was Post operative respiratory insufficiency which improved before discharge.
Nutrition consult completed; educated pt on GERD diet, avoidance of irritating foods, and to increase protein consumption for healing. Briefly spoke about diet consistency with pt; important to eat soft and bite sized foods until healed. Provided written educated to pt on GERD Diet. RD to remain available.

## 2023-06-05 ENCOUNTER — APPOINTMENT (OUTPATIENT)
Dept: UROGYNECOLOGY | Facility: CLINIC | Age: 53
End: 2023-06-05
Payer: MEDICARE

## 2023-06-05 VITALS
HEIGHT: 64 IN | WEIGHT: 215 LBS | DIASTOLIC BLOOD PRESSURE: 60 MMHG | BODY MASS INDEX: 36.7 KG/M2 | SYSTOLIC BLOOD PRESSURE: 118 MMHG

## 2023-06-05 DIAGNOSIS — Z46.89 ENCOUNTER FOR FITTING AND ADJUSTMENT OF OTHER SPECIFIED DEVICES: ICD-10-CM

## 2023-06-05 PROCEDURE — 57160 INSERT PESSARY/OTHER DEVICE: CPT | Mod: 59

## 2023-06-05 PROCEDURE — 99212 OFFICE O/P EST SF 10 MIN: CPT | Mod: 25

## 2023-06-05 PROCEDURE — A4562: CPT

## 2023-06-05 NOTE — ASSESSMENT
[FreeTextEntry1] : Patient with history of overactive bladder, mixed urinary incontinence, nocturia and fecal incontinence -s/p sacral neuromodulation who is interested in pessary use for stress urinary incontinence.  She first tried  2 1/4 inch incontinence ring with support pessary which moves with bowel movement.  She was then refitted today with #4 incontinence ring with support pessary

## 2023-06-05 NOTE — DISCUSSION/SUMMARY
[FreeTextEntry1] : Patient will try the new pessary.  If it works well for her, she will follow-up in 3 months.  If she has any issues, she was advised to come back sooner to the office.\par She is not intimate at this point but she is interested in learning pessary removal and insertion on next appointment.

## 2023-06-05 NOTE — HISTORY OF PRESENT ILLNESS
[FreeTextEntry1] : Patient is a 53-year-old nulligravida with history of overactive bladder, mixed urinary incontinence, nocturia and fecal incontinence s/p sacral neuromodulation by Dr. Schulz with complete resolution of nocturia and fecal incontinence.  Patient was having bothersome stress urinary incontinence and decided to have a trial of pessary.  She was fitted with 2 1/4 inch incontinence ring with support pessary on 5/18/2023.  She presents today for follow-up.  States that the pessary worked very well for the first week or so.  She then had a hard bowel movement and she felt her pessary displacement.  She puts the pessary back in but she feels like since then it has not been working as well and she has been experiencing some leakage with cough.  He would like to try another pessary\par \par \par

## 2023-06-05 NOTE — PHYSICAL EXAM
[Chaperone Present] : A chaperone was present in the examining room during all aspects of the physical examination [FreeTextEntry1] : General: Not in acute distress, alert and oriented x3.\par Pelvic Exam: Normal external female genitalia. Urethra is hypermobile without prolapse, exudates, or lesions. Cough stress test is negative with the pessary in place.. Narrow genital hiatus. 2 1/4 inch incontinence ring is in normal expected position.  Pessary was removed.  Patient was then refitted with #4 incontinence ring with support pessary. I could still fit a finger between the walls and the pessary. Cough stress test was negative with the pessary. The pessary stayed with Valsalva in standing position. Cough stress test was negative in standing position with the pessary in place.  \par

## 2023-06-08 ENCOUNTER — APPOINTMENT (OUTPATIENT)
Dept: THORACIC SURGERY | Facility: CLINIC | Age: 53
End: 2023-06-08

## 2023-06-08 VITALS
SYSTOLIC BLOOD PRESSURE: 128 MMHG | RESPIRATION RATE: 16 BRPM | HEART RATE: 79 BPM | OXYGEN SATURATION: 98 % | DIASTOLIC BLOOD PRESSURE: 77 MMHG | HEIGHT: 64 IN | WEIGHT: 215 LBS | BODY MASS INDEX: 36.7 KG/M2

## 2023-06-08 DIAGNOSIS — K58.9 IRRITABLE BOWEL SYNDROME W/OUT DIARRHEA: ICD-10-CM

## 2023-07-05 ENCOUNTER — APPOINTMENT (OUTPATIENT)
Dept: MAMMOGRAPHY | Facility: CLINIC | Age: 53
End: 2023-07-05
Payer: MEDICARE

## 2023-07-05 ENCOUNTER — APPOINTMENT (OUTPATIENT)
Dept: NEPHROLOGY | Facility: CLINIC | Age: 53
End: 2023-07-05
Payer: MEDICARE

## 2023-07-05 ENCOUNTER — APPOINTMENT (OUTPATIENT)
Dept: RADIOLOGY | Facility: CLINIC | Age: 53
End: 2023-07-05
Payer: MEDICARE

## 2023-07-05 ENCOUNTER — RESULT REVIEW (OUTPATIENT)
Age: 53
End: 2023-07-05

## 2023-07-05 VITALS
HEIGHT: 64 IN | BODY MASS INDEX: 36.02 KG/M2 | SYSTOLIC BLOOD PRESSURE: 126 MMHG | DIASTOLIC BLOOD PRESSURE: 72 MMHG | OXYGEN SATURATION: 96 % | HEART RATE: 76 BPM | WEIGHT: 211 LBS | RESPIRATION RATE: 16 BRPM

## 2023-07-05 PROCEDURE — 77080 DXA BONE DENSITY AXIAL: CPT

## 2023-07-05 PROCEDURE — 77063 BREAST TOMOSYNTHESIS BI: CPT

## 2023-07-05 PROCEDURE — 77067 SCR MAMMO BI INCL CAD: CPT

## 2023-07-05 PROCEDURE — 99215 OFFICE O/P EST HI 40 MIN: CPT

## 2023-07-05 NOTE — HISTORY OF PRESENT ILLNESS
[FreeTextEntry1] : Patient is a 52 year old female with history of HTN, DM for 20+ years, obesity, microalbuminuria; here for transitional visit within 7 days of discharge; called by me within 24 hours; was at [facility] 11/11/22-11/12/22; found to have elevated SCr of 2.0 downtrended to 1.4 after hydration due to N/V/D; likely DMN. No complaints at current.

## 2023-07-05 NOTE — ASSESSMENT
[FreeTextEntry1] : 1) CKD III\par 2) HTN\par 3) DM\par 4) Microalbuminuria\par \par Stop all NSAIDs\par Labs reviewed; Cr down to 1.2\par Proteinuria minimal\par Will hold off on ACEI or ARB or SGLT2 inhibitor\par Have discussed weight loss and exercise regimen with patient in detail\par \par RTC 4 months

## 2023-07-06 ENCOUNTER — APPOINTMENT (OUTPATIENT)
Dept: THORACIC SURGERY | Facility: CLINIC | Age: 53
End: 2023-07-06
Payer: MEDICARE

## 2023-07-06 VITALS
OXYGEN SATURATION: 97 % | HEIGHT: 64 IN | RESPIRATION RATE: 16 BRPM | SYSTOLIC BLOOD PRESSURE: 115 MMHG | HEART RATE: 72 BPM | WEIGHT: 211 LBS | BODY MASS INDEX: 36.02 KG/M2 | TEMPERATURE: 98 F | DIASTOLIC BLOOD PRESSURE: 75 MMHG

## 2023-07-06 PROCEDURE — 99024 POSTOP FOLLOW-UP VISIT: CPT

## 2023-07-06 RX ORDER — OMEPRAZOLE 40 MG/1
40 CAPSULE, DELAYED RELEASE ORAL
Refills: 0 | Status: COMPLETED | COMMUNITY
End: 2023-07-06

## 2023-07-06 RX ORDER — FAMOTIDINE 40 MG/1
40 TABLET, FILM COATED ORAL
Qty: 90 | Refills: 0 | Status: COMPLETED | COMMUNITY
Start: 2022-06-13 | End: 2023-07-06

## 2023-07-07 NOTE — REASON FOR VISIT
[de-identified] : Upper endoscopy with robot-assisted hiatal hernia repair with Toupet fundoplication [de-identified] : 4/24/2023  [de-identified] : Postoperative Course uneventful. CT esophagram negative for esophageal leak. Tolerating liquid diet. Discharged to home on 4/27/2023.\par This is her third postoperative visit. At last office visit we discussed that she discontinue Pepcid and Omeprazole.\par \par Today, She reports that she's tolerating regular diet. No dysphagia, or heart burn. Bowel movements regular.

## 2023-07-07 NOTE — PHYSICAL EXAM
[Site: ___] : Site: [unfilled] [Clean] : clean [Dry] : dry [Healing Well] : healing well [No Edema] : no edema [Bleeding] : no active bleeding [Foul Odor] : no foul smell [Purulent Drainage] : no purulent drainage [Serosanguinous Drainage] : no serosanguinous drainage [Erythema] : not erythematous [Warm] : not warm [Tender] : not tender

## 2023-07-07 NOTE — ASSESSMENT
[FreeTextEntry1] : Ms. NATIVIDAD MORA, 53 year old female, s/p Robotic assisted hiatal hernia repair with Toupet fundoplication on \par 4/24/23. Here today for clinical re-evaluation. \par \par I have independently reviewed the medical records and imaging at the time of this office consultation, and discussed the following interpretations with the patient:\par - Overall, feeling well; Tolerating oral diet. Discussed advancing diet as tolerated. Eat small, frequent meals; Eat slowly and chew thoroughly; Avoid gastric irritants and no straining with BMs.\par - Return to clinic in 2 months for clinical re-assessment. She is agreeable. \par \par Recommendations reviewed with patient during this office visit, and all questions answered; Patient instructed on the importance of follow up and verbalizes understanding.\par \par I, WAQAR Mirza, personally performed the evaluation and management (E/M) services for this established patient. That E/M includes conducting the examination, assessing all new/exacerbated conditions, and establishing a new plan of care. Today, My ACP, Chiquita Huynh, was here to observe my evaluation and management services for this patient to be followed going forward.\par \par \par

## 2023-07-07 NOTE — DISCUSSION/SUMMARY
[FreeTextEntry1] : Accession:                             [id number]\par \par Collected Date/Time:                   4/24/2023 17:00 EDT\par Received Date/Time:                    4/25/2023 11:18 EDT\par \par Surgical Pathology Report - Auth (Verified)\par \par Specimen(s) Submitted\par 1  Intraabdominal fat and hernia sac\par \par Final Diagnosis\par \par Hernia , Hiatal , robot assisted herniorrhaphy, and excision of intra\par abdominal fat\par - Hernia sac\par \par - Skeletal muscle, and adipose tissue with lymph node x 2,  and scar\par tissue\par - Negative for malignancy\par Verified by: Arnoldo Wang M.D.\par (Electronic Signature)

## 2023-07-08 LAB
ALBUMIN SERPL ELPH-MCNC: 4.7 G/DL
ANION GAP SERPL CALC-SCNC: 14 MMOL/L
APPEARANCE: CLEAR
BACTERIA: ABNORMAL /HPF
BILIRUBIN URINE: NEGATIVE
BLOOD URINE: NEGATIVE
BUN SERPL-MCNC: 17 MG/DL
CALCIUM OXALATE CRYSTALS: PRESENT
CALCIUM SERPL-MCNC: 9.4 MG/DL
CAST: 2 /LPF
CHLORIDE SERPL-SCNC: 108 MMOL/L
CO2 SERPL-SCNC: 22 MMOL/L
COLOR: YELLOW
CREAT SERPL-MCNC: 1.26 MG/DL
CREAT SPEC-SCNC: 241 MG/DL
CREAT/PROT UR: 0.1 RATIO
EGFR: 50 ML/MIN/1.73M2
EPITHELIAL CELLS: 3 /HPF
GLUCOSE QUALITATIVE U: NEGATIVE MG/DL
GLUCOSE SERPL-MCNC: 99 MG/DL
KETONES URINE: ABNORMAL MG/DL
LEUKOCYTE ESTERASE URINE: ABNORMAL
MICROSCOPIC-UA: NORMAL
NITRITE URINE: NEGATIVE
PH URINE: 5
PHOSPHATE SERPL-MCNC: 4 MG/DL
POTASSIUM SERPL-SCNC: 4.4 MMOL/L
PROT UR-MCNC: 18 MG/DL
PROTEIN URINE: 30 MG/DL
RED BLOOD CELLS URINE: 10 /HPF
REVIEW: NORMAL
SODIUM SERPL-SCNC: 143 MMOL/L
SPECIFIC GRAVITY URINE: 1.03
UROBILINOGEN URINE: 0.2 MG/DL
WHITE BLOOD CELLS URINE: 113 /HPF

## 2023-07-10 ENCOUNTER — APPOINTMENT (OUTPATIENT)
Dept: OBGYN | Facility: CLINIC | Age: 53
End: 2023-07-10

## 2023-08-31 ENCOUNTER — APPOINTMENT (OUTPATIENT)
Dept: UROGYNECOLOGY | Facility: CLINIC | Age: 53
End: 2023-08-31
Payer: MEDICARE

## 2023-08-31 VITALS
HEIGHT: 64 IN | WEIGHT: 211 LBS | SYSTOLIC BLOOD PRESSURE: 122 MMHG | DIASTOLIC BLOOD PRESSURE: 80 MMHG | BODY MASS INDEX: 36.02 KG/M2

## 2023-08-31 DIAGNOSIS — N32.81 OVERACTIVE BLADDER: ICD-10-CM

## 2023-08-31 PROCEDURE — 81003 URINALYSIS AUTO W/O SCOPE: CPT | Mod: QW

## 2023-08-31 PROCEDURE — 51701 INSERT BLADDER CATHETER: CPT | Mod: 59

## 2023-08-31 PROCEDURE — 99214 OFFICE O/P EST MOD 30 MIN: CPT | Mod: 25

## 2023-08-31 NOTE — DISCUSSION/SUMMARY
[FreeTextEntry1] : I discussed alternative options for management of a stress urinary incontinence.  Patient had urodynamic testing last year.  I reviewed her urodynamic testing with her and her mother.Her urodynamic test findings were consistent with normal uroflow (voided volume 100 cc, max flow 19, PVR 0), somewhat increased sensation, normal cystometric capacity of 430 cc, normal compliance, evidence of stress urinary incontinence starting at filled volumes of 100 cc, absence of cystometric detrusor overactivity and normal pressure voiding study ( voided volume 430 after being filled with 400 cc, max flow 37, pressure at peak flow 50 cm of water, intermittent flow pattern, voiding mechanism appear to be detrusor contraction).  I explained to mom that the patient is a good candidate for mid urethral sling placement.  I discussed the outpatient nature of the procedure, hospital course, postoperative restrictions and recovery time as well as potential complications of the procedure including increase in frequency of urinary tract infections, poor urinary stream, need for temporary bladder catheterization, injury to surrounding organs, mesh related complication including mesh exposure etc. I also explained to her that due to her high BMI (class II obesity), success rate of sling can be lower than what expected in a normal BMI patient.  She verbalized understanding.  I provided her a brochure regarding the procedure.  I also advised her to see her PCP preoperatively for optimization of her chronic medical conditions such as diabetes. In terms of her fecal incontinence, I recommended improving bowel consistency.  I advised her to discuss with her PCP to see if she can use alternative to metformin.  In addition she has history of cholecystectomy and might benefit from intake of cholestyramine.  She will discuss this with her PCP.  I also advised her to start taking that Metamucil or Citrucel every day. Urine specimen was sent for urine dip, UA and culture.  If she has UTI, will treat as indicated and may consider low-dose antibiotic prophylaxis such as Macrobid for 3 months.  I will also recommend cystoscopy and CT urogram in that case.  She is comfortable with this plan.

## 2023-08-31 NOTE — ASSESSMENT
[FreeTextEntry1] : Patient is a 53-year-old nullipara with poorly controlled stress incontinence despite trying two different pessaries, history of overactive bladder, mixed urinary incontinence, nocturia and fecal incontinence -s/p sacral neuromodulation.  Patient reports frequent urinary tract infections also.  She has a normal postvoid residual.  Pessary was left out due to evidence of pessary related vaginal trauma.

## 2023-08-31 NOTE — HISTORY OF PRESENT ILLNESS
[FreeTextEntry1] : Patient is a 53-year-old nulligravida who I have been seeing since May 2022 for management of overactive bladder, mixed urinary incontinence, nocturia and fecal incontinence s/p sacral neuromodulation by Dr. Schulz with complete resolution of nocturia and fecal incontinence. Patient was having bothersome stress urinary incontinence and decided to have a trial of pessary. She was fitted with 2 1/4 inch incontinence ring with support pessary on 5/18/2023. She was then refitted with #4 incontinence ring with support pessary in June because of the displacement of the first pessary.  She presents today with her mother.  Patient states that pessary helps however she is still leaking moderate amount of urine every time she sneezes or coughs.  She notices urine on her pad.  Denies urinary urgency.  She wakes up once or twice at night and able to make it to the restroom. Patient also report fecal incontinence.  She reports diarrhea.  She states that she has been treated for urinary tract infections few times since the last visit.  Patient is accompanied by her mother who contributed.  The patient by providing history about patient's  bowel symptoms

## 2023-08-31 NOTE — PHYSICAL EXAM
[Chaperone Present] : A chaperone was present in the examining room during all aspects of the physical examination [No Acute Distress] : in no acute distress [Well developed] : well developed [Oriented x3] : oriented to person, place, and time [FreeTextEntry1] : General: Not in acute distress, alert and oriented x3. Pelvic Exam: Normal external female genitalia. Urethra is hypermobile without prolapse, exudates, or lesions. Cough stress test is negative with the pessary in place.. Narrow genital hiatus.  #4 incontinence ring with support pessary was removed.  Scant amount of brownish blood type discharge noted on the pessary.  On speculum exam, superficial bilateral vaginal sidewall lacerations noted which were oozing.  These were treated with silver nitrate to achieve hemostasis.  Pessary was not replaced and was returned to the patient..

## 2023-08-31 NOTE — PROCEDURE
[Other ___] : [unfilled] [Stress Incontinence] : stress incontinence [Patient] : the patient [None] : there were no complications with the catheter insertion [Culture] : culture [Urinalysis] : urinalysis [Residual Volume ___ cc] : residual volume [unfilled] cc [No Complications] : no complications [Tolerated Well] : the patient tolerated the procedure well [Post procedure instructions and information given] : Post procedure instructions and information were given and reviewed with patient. [0] : 0 [Pessary affecting urine stream] : pessary affecting urine stream [Bladder Scan Completed] : a pelvic/bladder ultrasound was performed to determine the residual volume. [Good Fit] : fits well [Refit] : refit is not needed [Erosion] : evidence of erosion [Erythema] : erythema noted [Discharge] : there is vaginal discharge [Infection] : no evidence of infection [Pessary Out] : removed [Mild] : mild bleeding [Hemostatic Agent used (Silver Nitrate)] : a hemostatic agent (Silver Nitrate) was used to resolve bleeding [Residual Volume ___] : the final residual volume was [unfilled] cc [Medication Review] : Medicaiton Review: Patient verbalizes understanding of risks and benefits [Fluid Management] : Fluid Management: patient verbalizes understanding 6-10 cups per day [FreeTextEntry1] : #4 incontinence ring with support pessary [FreeTextEntry3] : Recommended use of cholestyramine and Metamucil for him improving bowel consistency.  Also encouraged her to speak to her PCP about alternatives to metformin [FreeTextEntry5] : Discussed avoidance of bladder irritants especially close to bedtime [FreeTextEntry4] : Recommended use of cholestyramine and Metamucil for improving bowel consistency [FreeTextEntry8] : Patient is considering surgery.  She is not interested in continuing pessary for now.

## 2023-09-01 LAB
BILIRUB UR QL STRIP: NEGATIVE
GLUCOSE UR-MCNC: NEGATIVE
HCG UR QL: 0.2 EU/DL
HGB UR QL STRIP.AUTO: NEGATIVE
KETONES UR-MCNC: NEGATIVE
LEUKOCYTE ESTERASE UR QL STRIP: NORMAL
NITRITE UR QL STRIP: NEGATIVE
PH UR STRIP: 5
PROT UR STRIP-MCNC: NEGATIVE
SP GR UR STRIP: 1.02

## 2023-09-02 LAB
APPEARANCE: CLEAR
BACTERIA UR CULT: NORMAL
BACTERIA: NEGATIVE /HPF
BILIRUBIN URINE: NEGATIVE
BLOOD URINE: NEGATIVE
CALCIUM OXALATE CRYSTALS: PRESENT
CAST: 0 /LPF
COLOR: YELLOW
EPITHELIAL CELLS: 2 /HPF
GLUCOSE QUALITATIVE U: NEGATIVE MG/DL
KETONES URINE: NEGATIVE MG/DL
LEUKOCYTE ESTERASE URINE: ABNORMAL
MICROSCOPIC-UA: NORMAL
NITRITE URINE: NEGATIVE
PH URINE: 5.5
PROTEIN URINE: NEGATIVE MG/DL
RED BLOOD CELLS URINE: 1 /HPF
REVIEW: NORMAL
SPECIFIC GRAVITY URINE: 1.02
UROBILINOGEN URINE: 0.2 MG/DL
WHITE BLOOD CELLS URINE: 11 /HPF

## 2023-09-06 PROBLEM — Z87.440 HISTORY OF RECURRENT UTIS: Status: ACTIVE | Noted: 2022-05-27

## 2023-09-07 ENCOUNTER — APPOINTMENT (OUTPATIENT)
Dept: THORACIC SURGERY | Facility: CLINIC | Age: 53
End: 2023-09-07
Payer: MEDICARE

## 2023-09-07 VITALS
OXYGEN SATURATION: 97 % | RESPIRATION RATE: 16 BRPM | HEIGHT: 64 IN | WEIGHT: 211 LBS | BODY MASS INDEX: 36.02 KG/M2 | HEART RATE: 77 BPM

## 2023-09-07 DIAGNOSIS — Z87.440 PERSONAL HISTORY OF URINARY (TRACT) INFECTIONS: ICD-10-CM

## 2023-09-07 PROCEDURE — 99213 OFFICE O/P EST LOW 20 MIN: CPT

## 2023-09-07 NOTE — PHYSICAL EXAM
[No Edema] : no edema [Sclera] : the sclera and conjunctiva were normal [Neck Appearance] : the appearance of the neck was normal [Respiration, Rhythm And Depth] : normal respiratory rhythm and effort [Heart Rate And Rhythm] : heart rate was normal and rhythm regular [Examination Of The Chest] : the chest was normal in appearance [Abdomen Soft] : soft [Abdomen Tenderness] : non-tender [Abnormal Walk] : normal gait [Skin Color & Pigmentation] : normal skin color and pigmentation [Sensation] : the sensory exam was normal to light touch and pinprick [Oriented To Time, Place, And Person] : oriented to person, place, and time [Impaired Insight] : insight and judgment were intact

## 2023-09-07 NOTE — HISTORY OF PRESENT ILLNESS
[FreeTextEntry1] : NATIVIDAD MORA is status post Upper endoscopy with robot-assisted hiatal hernia repair with Toupet fundoplication on 4/24/2023   Postoperative Course uneventful. CT esophageal negative for esophageal leak. Tolerating liquid diet. Discharged to home on 4/27/2023. She offers complaints of early saity. She is able to tolerate 3-4 bites and feels full. She has some issues with pasta where she felt that it created a heartburn stuck feeling. She was able to get it down with fluids. This has been occuring for the past few weeks and is getting progressively worse.

## 2023-09-07 NOTE — REVIEW OF SYSTEMS
[Diarrhea] : diarrhea [Heartburn] : heartburn [Negative] : Heme/Lymph [Feeling Poorly] : not feeling poorly [Feeling Tired] : not feeling tired [Chest Pain] : no chest pain [Palpitations] : no palpitations [Shortness Of Breath] : no shortness of breath [Cough] : no cough [SOB on Exertion] : no shortness of breath during exertion [Abdominal Pain] : no abdominal pain [Vomiting] : no vomiting [Constipation] : no constipation [Melena] : no melena

## 2023-09-07 NOTE — ASSESSMENT
[FreeTextEntry1] : NATIVIDAD MORA is status post Upper endoscopy with robot-assisted hiatal hernia repair with Toupet fundoplication on 4/24/2023   Postoperative Course uneventful. CT esophagram negative for esophageal leak. Tolerating liquid diet. Discharged to home on 4/27/2023.  I have reviewed the patient's medical records, diagnostic images during the time of this office consultation and have made the following recommendation.   She is experiencing some dysphagia and a stuck feeling of her food intake. She does not have symptoms of GERD however the wrap could be a little tight. I am recommending endoscopy to evaluate the anastomosis. The procedure, hospital stay and recovery was discussed in detail. All risks, benefits, and alternatives discussed at length with patient. All questions addressed. Patient would like to proceed with surgical intervention as discussed.   PLAN: - Endoscopy     I, Dr. Lr personally performed the evaluation and management (E/M) services for this patient. That E/M includes conducting the initial examination, assessing all conditions, and establishing the plan of care. Today, Solitario Barker NP was here to observe my evaluation and management services for this patient.

## 2023-09-19 ENCOUNTER — NON-APPOINTMENT (OUTPATIENT)
Age: 53
End: 2023-09-19

## 2023-10-03 ENCOUNTER — TRANSCRIPTION ENCOUNTER (OUTPATIENT)
Age: 53
End: 2023-10-03

## 2023-10-03 ENCOUNTER — APPOINTMENT (OUTPATIENT)
Dept: CT IMAGING | Facility: CLINIC | Age: 53
End: 2023-10-03
Payer: MEDICARE

## 2023-10-03 PROCEDURE — 74176 CT ABD & PELVIS W/O CONTRAST: CPT | Mod: MH

## 2023-10-04 ENCOUNTER — OUTPATIENT (OUTPATIENT)
Dept: OUTPATIENT SERVICES | Facility: HOSPITAL | Age: 53
LOS: 1 days | End: 2023-10-04
Payer: MEDICARE

## 2023-10-04 ENCOUNTER — APPOINTMENT (OUTPATIENT)
Dept: THORACIC SURGERY | Facility: HOSPITAL | Age: 53
End: 2023-10-04

## 2023-10-04 ENCOUNTER — TRANSCRIPTION ENCOUNTER (OUTPATIENT)
Age: 53
End: 2023-10-04

## 2023-10-04 ENCOUNTER — RESULT REVIEW (OUTPATIENT)
Age: 53
End: 2023-10-04

## 2023-10-04 DIAGNOSIS — Z90.49 ACQUIRED ABSENCE OF OTHER SPECIFIED PARTS OF DIGESTIVE TRACT: Chronic | ICD-10-CM

## 2023-10-04 DIAGNOSIS — R13.10 DYSPHAGIA, UNSPECIFIED: ICD-10-CM

## 2023-10-04 PROCEDURE — 43200 ESOPHAGOSCOPY FLEXIBLE BRUSH: CPT

## 2023-10-04 PROCEDURE — 88305 TISSUE EXAM BY PATHOLOGIST: CPT | Mod: 26

## 2023-10-04 PROCEDURE — 88342 IMHCHEM/IMCYTCHM 1ST ANTB: CPT

## 2023-10-04 PROCEDURE — 88342 IMHCHEM/IMCYTCHM 1ST ANTB: CPT | Mod: 26

## 2023-10-04 PROCEDURE — 43239 EGD BIOPSY SINGLE/MULTIPLE: CPT

## 2023-10-04 PROCEDURE — 88305 TISSUE EXAM BY PATHOLOGIST: CPT

## 2023-10-04 NOTE — BRIEF OPERATIVE NOTE - NSICDXBRIEFPROCEDURE_GEN_ALL_CORE_FT
PROCEDURES:  Endoscopy, upper GI tract, with biopsy 04-Oct-2023 08:04:35 biopsy of antrum, GE junction, mid-esophagus Jeannie Lovell

## 2023-10-06 ENCOUNTER — APPOINTMENT (OUTPATIENT)
Dept: CARDIOLOGY | Facility: CLINIC | Age: 53
End: 2023-10-06
Payer: MEDICARE

## 2023-10-06 VITALS
BODY MASS INDEX: 38.76 KG/M2 | WEIGHT: 227 LBS | HEART RATE: 85 BPM | SYSTOLIC BLOOD PRESSURE: 148 MMHG | DIASTOLIC BLOOD PRESSURE: 76 MMHG | OXYGEN SATURATION: 98 % | RESPIRATION RATE: 16 BRPM | HEIGHT: 64 IN

## 2023-10-06 PROCEDURE — 99214 OFFICE O/P EST MOD 30 MIN: CPT

## 2023-10-06 RX ORDER — ASCORBIC ACID 500 MG
TABLET ORAL
Refills: 0 | Status: DISCONTINUED | COMMUNITY
End: 2023-10-06

## 2023-10-10 LAB — SURGICAL PATHOLOGY STUDY: SIGNIFICANT CHANGE UP

## 2023-10-19 ENCOUNTER — APPOINTMENT (OUTPATIENT)
Dept: THORACIC SURGERY | Facility: CLINIC | Age: 53
End: 2023-10-19

## 2023-10-24 ENCOUNTER — APPOINTMENT (OUTPATIENT)
Dept: PULMONOLOGY | Facility: CLINIC | Age: 53
End: 2023-10-24
Payer: MEDICARE

## 2023-10-24 VITALS
OXYGEN SATURATION: 98 % | SYSTOLIC BLOOD PRESSURE: 130 MMHG | RESPIRATION RATE: 16 BRPM | HEIGHT: 64 IN | BODY MASS INDEX: 37.56 KG/M2 | HEART RATE: 79 BPM | DIASTOLIC BLOOD PRESSURE: 74 MMHG | WEIGHT: 220 LBS

## 2023-10-24 DIAGNOSIS — Z09 ENCOUNTER FOR FOLLOW-UP EXAMINATION AFTER COMPLETED TREATMENT FOR CONDITIONS OTHER THAN MALIGNANT NEOPLASM: ICD-10-CM

## 2023-10-24 DIAGNOSIS — J45.901 UNSPECIFIED ASTHMA WITH (ACUTE) EXACERBATION: ICD-10-CM

## 2023-10-24 DIAGNOSIS — R91.8 OTHER NONSPECIFIC ABNORMAL FINDING OF LUNG FIELD: ICD-10-CM

## 2023-10-24 PROCEDURE — 99215 OFFICE O/P EST HI 40 MIN: CPT | Mod: 25

## 2023-10-24 PROCEDURE — 94010 BREATHING CAPACITY TEST: CPT

## 2023-10-24 RX ORDER — GABAPENTIN 300 MG/1
300 CAPSULE ORAL 3 TIMES DAILY
Refills: 0 | Status: ACTIVE | COMMUNITY
Start: 2022-10-21

## 2023-10-24 RX ORDER — ALBUTEROL SULFATE 90 UG/1
108 (90 BASE) INHALANT RESPIRATORY (INHALATION)
Refills: 0 | Status: ACTIVE | COMMUNITY

## 2023-11-02 ENCOUNTER — APPOINTMENT (OUTPATIENT)
Dept: THORACIC SURGERY | Facility: CLINIC | Age: 53
End: 2023-11-02
Payer: MEDICARE

## 2023-11-02 VITALS
DIASTOLIC BLOOD PRESSURE: 84 MMHG | HEART RATE: 88 BPM | RESPIRATION RATE: 16 BRPM | SYSTOLIC BLOOD PRESSURE: 148 MMHG | OXYGEN SATURATION: 97 %

## 2023-11-02 PROCEDURE — 99213 OFFICE O/P EST LOW 20 MIN: CPT

## 2023-11-03 ENCOUNTER — APPOINTMENT (OUTPATIENT)
Dept: CARDIOLOGY | Facility: CLINIC | Age: 53
End: 2023-11-03
Payer: MEDICARE

## 2023-11-03 VITALS
OXYGEN SATURATION: 98 % | WEIGHT: 220 LBS | SYSTOLIC BLOOD PRESSURE: 132 MMHG | DIASTOLIC BLOOD PRESSURE: 76 MMHG | HEART RATE: 82 BPM | BODY MASS INDEX: 35.36 KG/M2 | HEIGHT: 66 IN | RESPIRATION RATE: 14 BRPM

## 2023-11-03 VITALS — DIASTOLIC BLOOD PRESSURE: 64 MMHG | SYSTOLIC BLOOD PRESSURE: 128 MMHG

## 2023-11-03 PROCEDURE — 99214 OFFICE O/P EST MOD 30 MIN: CPT

## 2023-11-09 ENCOUNTER — APPOINTMENT (OUTPATIENT)
Dept: CARDIOLOGY | Facility: CLINIC | Age: 53
End: 2023-11-09

## 2023-12-21 ENCOUNTER — APPOINTMENT (OUTPATIENT)
Dept: NEPHROLOGY | Facility: CLINIC | Age: 53
End: 2023-12-21
Payer: MEDICARE

## 2023-12-21 VITALS
WEIGHT: 218 LBS | TEMPERATURE: 98.1 F | HEIGHT: 66 IN | BODY MASS INDEX: 35.03 KG/M2 | SYSTOLIC BLOOD PRESSURE: 126 MMHG | OXYGEN SATURATION: 98 % | HEART RATE: 75 BPM | DIASTOLIC BLOOD PRESSURE: 70 MMHG

## 2023-12-21 PROCEDURE — 99215 OFFICE O/P EST HI 40 MIN: CPT

## 2023-12-21 NOTE — HISTORY OF PRESENT ILLNESS
[FreeTextEntry1] : Patient is a 52 year old female with history of HTN, DM for 20+ years, obesity, microalbuminuria; here for transitional visit within 7 days of discharge; called by me within 24 hours; [place] [facility] [place]; found to have elevated SCr of 2.0 downtrended to 1.4 after hydration due to N/V/D; likely DMN. No complaints at current.   Current: Pt seen/examined; feeling tired;

## 2023-12-21 NOTE — ASSESSMENT
[FreeTextEntry1] : 1) CKD III  2) HTN  3) DM  4) Microalbuminuria    Stop all NSAIDs  Labs reviewed; Cr down to 1.2  Proteinuria minimal  Will hold off on ACEI or ARB or SGLT2 inhibitor  Have discussed weight loss and exercise regimen with patient in detail  Will check urine culture and labs today    RTC 4 months.

## 2023-12-22 LAB
ALBUMIN SERPL ELPH-MCNC: 4.4 G/DL
ANION GAP SERPL CALC-SCNC: 13 MMOL/L
APPEARANCE: CLEAR
BACTERIA: NEGATIVE /HPF
BILIRUBIN URINE: NEGATIVE
BLOOD URINE: NEGATIVE
BUN SERPL-MCNC: 13 MG/DL
CALCIUM OXALATE CRYSTALS: PRESENT
CALCIUM SERPL-MCNC: 9.3 MG/DL
CAST: 0 /LPF
CHLORIDE SERPL-SCNC: 110 MMOL/L
CO2 SERPL-SCNC: 22 MMOL/L
COLOR: YELLOW
CREAT SERPL-MCNC: 1.07 MG/DL
CREAT SPEC-SCNC: 116 MG/DL
CREAT/PROT UR: 0.1 RATIO
EGFR: 61 ML/MIN/1.73M2
EPITHELIAL CELLS: 0 /HPF
GLUCOSE QUALITATIVE U: NEGATIVE MG/DL
GLUCOSE SERPL-MCNC: 125 MG/DL
KETONES URINE: NEGATIVE MG/DL
LEUKOCYTE ESTERASE URINE: NEGATIVE
MICROSCOPIC-UA: NORMAL
NITRITE URINE: NEGATIVE
PH URINE: 6.5
PHOSPHATE SERPL-MCNC: 3.9 MG/DL
POTASSIUM SERPL-SCNC: 5 MMOL/L
PROT UR-MCNC: 12 MG/DL
PROTEIN URINE: NEGATIVE MG/DL
RED BLOOD CELLS URINE: 2 /HPF
REVIEW: NORMAL
SODIUM SERPL-SCNC: 144 MMOL/L
SPECIFIC GRAVITY URINE: 1.02
UROBILINOGEN URINE: 0.2 MG/DL
WHITE BLOOD CELLS URINE: 1 /HPF

## 2023-12-26 ENCOUNTER — NON-APPOINTMENT (OUTPATIENT)
Age: 53
End: 2023-12-26

## 2023-12-26 ENCOUNTER — APPOINTMENT (OUTPATIENT)
Dept: CARDIOLOGY | Facility: CLINIC | Age: 53
End: 2023-12-26
Payer: MEDICARE

## 2023-12-26 VITALS
SYSTOLIC BLOOD PRESSURE: 124 MMHG | HEART RATE: 80 BPM | DIASTOLIC BLOOD PRESSURE: 76 MMHG | BODY MASS INDEX: 36 KG/M2 | OXYGEN SATURATION: 99 % | HEIGHT: 66 IN | WEIGHT: 224 LBS | RESPIRATION RATE: 14 BRPM

## 2023-12-26 DIAGNOSIS — I42.9 CARDIOMYOPATHY, UNSPECIFIED: ICD-10-CM

## 2023-12-26 PROCEDURE — 99214 OFFICE O/P EST MOD 30 MIN: CPT

## 2023-12-26 PROCEDURE — 93000 ELECTROCARDIOGRAM COMPLETE: CPT | Mod: NC

## 2023-12-26 PROCEDURE — 96374 THER/PROPH/DIAG INJ IV PUSH: CPT | Mod: 59

## 2023-12-26 PROCEDURE — 93308 TTE F-UP OR LMTD: CPT

## 2023-12-26 NOTE — PHYSICAL EXAM
[Normal] : clear lung fields, good air entry, no respiratory distress [Soft] : abdomen soft [de-identified] : Obesity

## 2023-12-26 NOTE — DISCUSSION/SUMMARY
[FreeTextEntry1] : To review, Jose Eduardo is a very pleasant 53-year-old female with a past medical history as noted above and the following active issues.  Shortness of breath on exertion has been stable.  Overall LVEF is normal.  Most likely this is multifactorial related to obesity and physical conditioning.  Still, she is able to go up 2 flight of steps at her own pace without chest pain or shortness of breath.  She has history of normal coronary arteries via cardiac catheterization 2022.  She is status post fundoplication.  Hypertension: Well-controlled on current meds.  Continue same.  Hyperlipidemia: Tolerating statin therapy.  Diabetes: Continue follow-up with endocrinology.  EKG was done in the office today: Has poor R wave progression.  Unchanged compared to previous EKG from March 2023.  Overall, patient should be low risk for major cardiovascular events with anticipated surgery and should proceed.  She should continue her current medications.  Some of the diabetes medications may have to be adjusted depending upon the n.p.o. status.  Thank you for this referral and allowing me to participate in the care of this patient.  If I can be of any further help or  if you have any questions, please do not hesitate to contact me   Sincerely,  Gregorio Stephenson MD, FACC, VENICE

## 2023-12-26 NOTE — REVIEW OF SYSTEMS
[SOB] : shortness of breath [Dyspnea on exertion] : dyspnea during exertion [Negative] : Respiratory

## 2023-12-26 NOTE — HISTORY OF PRESENT ILLNESS
[FreeTextEntry1] : Julianna is a 53-year-old female with hypertension, obesity, diabetes, GERD.    After abnormal stress test: Invasive coronary angiography in 2022 revealed normal coronary arteries.  Hypertension: Well-controlled.    Obesity, sleep apnea, on CPAP mask.  Patient with history of GERD.  She had hernia repair and fundoplication.   Patient went to [facility] ER on 9/28/2023.  Troponins were negative.  EKGs were unchanged.  Overall, clinical presentation was not consistent with ACS.  She went to the ER again for abdominal pain and was discharged after CAT scan of the abdomen.  Has followed up with surgeon.  Normal endoscopy.  Advised to eat smaller meals and take smaller bites.    No PND, orthopnea, pedal edema.  No history of cardiovascular events in the past year.  Echocardiogram October 2023 revealed EF of 55%.  Mid anteroseptal segment and mid inferoseptal segments are mildly hypokinetic.  Mild LVH.  Mild MR and trace AR.  No pericardial effusion.  Normal PASP.  Study was technically difficult.

## 2023-12-30 ENCOUNTER — NON-APPOINTMENT (OUTPATIENT)
Age: 53
End: 2023-12-30

## 2024-01-02 LAB — BACTERIA UR CULT: NORMAL

## 2024-01-10 ENCOUNTER — APPOINTMENT (OUTPATIENT)
Dept: PULMONOLOGY | Facility: CLINIC | Age: 54
End: 2024-01-10
Payer: MEDICARE

## 2024-01-10 VITALS
HEART RATE: 97 BPM | RESPIRATION RATE: 16 BRPM | OXYGEN SATURATION: 96 % | DIASTOLIC BLOOD PRESSURE: 80 MMHG | SYSTOLIC BLOOD PRESSURE: 132 MMHG

## 2024-01-10 VITALS — WEIGHT: 172 LBS | HEIGHT: 64 IN | BODY MASS INDEX: 29.37 KG/M2

## 2024-01-10 DIAGNOSIS — R05.9 COUGH, UNSPECIFIED: ICD-10-CM

## 2024-01-10 PROCEDURE — 94010 BREATHING CAPACITY TEST: CPT

## 2024-01-10 PROCEDURE — 99214 OFFICE O/P EST MOD 30 MIN: CPT | Mod: 25

## 2024-01-10 NOTE — CONSULT LETTER
[Dear  ___] : Dear  [unfilled], [Consult Letter:] : I had the pleasure of evaluating your patient, [unfilled]. [Please see my note below.] : Please see my note below. [Consult Closing:] : Thank you very much for allowing me to participate in the care of this patient.  If you have any questions, please do not hesitate to contact me. [Sincerely,] : Sincerely, [FreeTextEntry3] : Bladimir Mckinnon MD FCCP\par  Pulmonary/Critical Care/Sleep Medicine\par  Department of Internal Medicine\par  \par  [facility] [employer]\par

## 2024-01-10 NOTE — DISCUSSION/SUMMARY
[Obstructive Sleep Apnea] : obstructive sleep apnea [Severe] : severe [Responding to Treatment] : responding to treatment [Alcohol Avoidance] : alcohol avoidance [Sedative Avoidance] : sedative avoidance [Weight Loss Program] : weight loss program [CPAP] : CPAP [FreeTextEntry1] : 53-year-old female seen today with a presumed history of reactive airways disease most likely postviral.  Spirometry remains normal despite discontinuation of long-acting bronchodilators.  Residual cough appears to be upper airway induced and the patient will be following up with her ENT.  No further pulmonary workup indicated.

## 2024-01-10 NOTE — RESULTS/DATA
[TextEntry] : 10/16/2023: [facility] noncontrast chest GX-[id number] scarring with linear atelectasis in the right middle and lower lobe as well as left upper lobe.  No nodules or infiltrates noted.  10/20/2023: [facility] chest x-ray-normal

## 2024-01-10 NOTE — HISTORY OF PRESENT ILLNESS
[Never] : never [Obstructive Sleep Apnea] : obstructive sleep apnea [Home] : home [APAP:] : APAP [Full Face mask] : full face mask [TextBox_4] : 53-year-old female with a history of severe obstructive sleep apnea seen today in follow-up.  On her last visit she had been status post hospitalization at which time she was suffering from probable pneumonia with reactive airways disease.  She was instructed to complete her present inhaler of Symbicort and then discontinue with follow-up here and subsequent spirometry.  Chest CT at the time was consistent with only postinflammatory changes without acute findings.  Since her last visit she has had several rare episodes of cough with some lightheadedness.  She describes her cough mostly as a postnasal drip with and in her upper airway.  No hemoptysis significant sputum.  This occurs possibly every several weeks for only several hours.  [Awakes Unrefreshed] : does not awaken unrefreshed [Awakes with Dry Mouth] : does not awaken with dry mouth [Awakes with Headache] : does not awaken with headache [Daytime Somnolence] : denies daytime somnolence [Snoring] : no snoring [TextBox_77] : 12am [TextBox_79] : 8am [TextBox_81] : 30 [TextBox_89] : 2 [TextBox_100] : 5/22/20 [TextBox_108] : 54.8 [TextBox_125] : 4-16 [TextBox_127] : 12/2/2023 [TextBox_129] : 12/31/2023 [TextBox_133] : 57 [TextBox_137] : 53 [TextBox_141] : 6 [TextBox_143] : 10 [TextBox_147] : 2.7 [TextBox_158] : Yemi [TextBox_160] : F32 [TextBox_162] : 6/8/20 [TextBox_164] : 6/8/25 [TextBox_165] : P 95%: 11.1 cm H2O sinusitis affected compliance [ESS] : 13

## 2024-01-11 ENCOUNTER — APPOINTMENT (OUTPATIENT)
Dept: UROGYNECOLOGY | Facility: CLINIC | Age: 54
End: 2024-01-11
Payer: MEDICARE

## 2024-01-11 PROCEDURE — 99214 OFFICE O/P EST MOD 30 MIN: CPT

## 2024-01-11 NOTE — ASSESSMENT
[FreeTextEntry1] : Patient is a 53-year-old nullipara with poorly controlled stress incontinence despite trying two different pessaries, history of overactive bladder, mixed urinary incontinence, nocturia and fecal incontinence -s/p sacral neuromodulation.  She is a scheduled for sling procedure next week

## 2024-01-11 NOTE — DISCUSSION/SUMMARY
[FreeTextEntry1] : I reviewed the urodynamic test findings with the patient.  Discussed with the with her that we will be performing retropubic mid urethral sling procedure.  Discussed the details of the procedure using diagrams including outpatient nature of the procedure, potential risk such as suprapubic pain, need for bladder catheterization, changes in urinary stream, urinary tract infections, mesh related complications, etc. residual instructions and what to expect following the procedure were provided to the patient All questions were answered to her satisfaction

## 2024-01-11 NOTE — HISTORY OF PRESENT ILLNESS
[FreeTextEntry1] : Patient is a 53-year-old nulligravida with past medical history significant for obesity, hypertension, hyperlipidemia, diabetes, severe obstructive sleep apnea, exertional shortness of breath s/p cardiac cath, who is scheduled to undergo retropubic mid urethral sling placement next week.  She presents today to discuss few questions she has regarding the surgery.  She was last seen in August 2023.  She has been following up with her cardiologist, pulmonologist, nephrologist etc.  She had her preoperative clearance exam scheduled later today. Patient states that she has been complaining about the sling procedure.  She is wondering if she what type of sling she is going to undergo.?  She also wants to review all postoperative restrictions etc.

## 2024-01-16 ENCOUNTER — APPOINTMENT (OUTPATIENT)
Dept: UROGYNECOLOGY | Facility: HOSPITAL | Age: 54
End: 2024-01-16

## 2024-01-16 DIAGNOSIS — G89.18 OTHER ACUTE POSTPROCEDURAL PAIN: ICD-10-CM

## 2024-01-19 ENCOUNTER — APPOINTMENT (OUTPATIENT)
Dept: UROGYNECOLOGY | Facility: CLINIC | Age: 54
End: 2024-01-19
Payer: MEDICARE

## 2024-01-19 PROCEDURE — 99024 POSTOP FOLLOW-UP VISIT: CPT

## 2024-01-19 NOTE — OBJECTIVE
[Voiding Trial] : Voiding trial was performed [Post Void Residual ____ ml] : Post Void Residual was [unfilled] ml [Soft and Nontender] : soft and nontender

## 2024-01-19 NOTE — DISCUSSION/SUMMARY
[FreeTextEntry1] : [] Continue all post operative restrictions for next five and half weeks. Instructions given to patient, and she verbalized understanding. [] Pt was given nun's hat to measure urine at home. Advise her to call us on Monday to give UOP. [] Continue taking Tylenol and use heating pad for pain. [] For constipation take Metamucil daily and for acute relief take MiraLAX. [] Follow up in four weeks. [] Instructed to call with any questions or concerns and she verbalizes understanding.

## 2024-01-19 NOTE — SUBJECTIVE
[FreeTextEntry1] : Julianna is now post op mid-urethral sling with cystoscopy on 11/16/23. Pt was discharged home with mccormick. Pt is here today for TOV and mccormick removal. Pt is doing well otherwise. Pt reports moderate pain in vagina and taking oxycodone and tylenol for that. Pt also reports constipation and she had bowel movement once post operatively. Pt reports no nausea/vomiting.  Voiding trial done in the office. She was filled with 250ml and she voided 250ml in nun's hat. [FreeTextEntry7] : Moderate [FreeTextEntry6] : Well [FreeTextEntry4] : Constipation [FreeTextEntry3] : Well [FreeTextEntry2] : Well

## 2024-01-19 NOTE — ASSESSMENT
[FreeTextEntry1] : Julianna is now post op mid-urethral sling with cystoscopy on 11/16/23. Pt was discharged home with mccormick same day. Pt passed TOV and mccormick is removed today. Pt is doing well post-operatively.

## 2024-01-22 ENCOUNTER — NON-APPOINTMENT (OUTPATIENT)
Age: 54
End: 2024-01-22

## 2024-01-24 ENCOUNTER — APPOINTMENT (OUTPATIENT)
Dept: ORTHOPEDIC SURGERY | Facility: CLINIC | Age: 54
End: 2024-01-24
Payer: MEDICARE

## 2024-01-24 VITALS
WEIGHT: 172 LBS | HEART RATE: 92 BPM | SYSTOLIC BLOOD PRESSURE: 132 MMHG | BODY MASS INDEX: 29.37 KG/M2 | DIASTOLIC BLOOD PRESSURE: 82 MMHG | HEIGHT: 64 IN

## 2024-01-24 PROCEDURE — 99203 OFFICE O/P NEW LOW 30 MIN: CPT

## 2024-01-24 PROCEDURE — 73564 X-RAY EXAM KNEE 4 OR MORE: CPT | Mod: RT

## 2024-01-24 RX ORDER — DICLOFENAC SODIUM 1% 10 MG/G
1 GEL TOPICAL DAILY
Qty: 1 | Refills: 1 | Status: ACTIVE | COMMUNITY
Start: 2024-01-24 | End: 1900-01-01

## 2024-01-24 NOTE — DISCUSSION/SUMMARY
[Medication Risks Reviewed] : Medication risks reviewed [Surgical risks reviewed] : Surgical risks reviewed [de-identified] : Patient is a 53-year-old female with moderate right knee osteoarthritis presenting today for initial evaluation.  I recommended conservative treatment at this time.  We will defer cortisone injection as she states that she had one approximately 1 week ago.  I recommended low impact activity and exercise.  I recommended physical therapy.  I gave her prescription for Voltaren gel.  I will see her back in 3 weeks for repeat evaluation.  If no improvement in symptoms we will consider viscosupplementation at that time.  All questions were asked and answered

## 2024-01-24 NOTE — PHYSICAL EXAM
[de-identified] : GENERAL APPEARANCE: Well nourished and hydrated, pleasant, alert, and oriented x 3. Appears their stated age.  HEENT: Normocephalic, extraocular eye motion intact. Nasal septum midline. Oral cavity clear. External auditory canal clear.  RESPIRATORY: Breath sounds clear and audible in all lobes. No wheezing, No accessory muscle use. CARDIOVASCULAR: No apparent abnormalities. No lower leg edema. No varicosities. Pedal pulses are palpable. NEUROLOGIC: Sensation is normal, no muscle weakness in the upper or lower extremities. DERMATOLOGIC: No apparent skin lesions, moist, warm, no rash. SPINE: Cervical spine appears normal and moves freely; thoracic spine appears normal and moves freely; lumbosacral spine appears normal and moves freely, normal, nontender. MUSCULOSKELETAL: Hands, wrists, and elbows are normal and move freely, shoulders are normal and move freely.  Psychiatric: Oriented to person, place, and time, insight and judgement were intact and the affect was normal.  Musculoskeletal:. Right knee exam shows mild effusion, ROM is 0-120 degrees, no instability, pain with Bettina, medial joint line tenderness.  5/5 motor strength in bilateral lower extremities. Sensory: Intact in bilateral lower extremities. DTRs: Biceps, brachioradialis, triceps, patellar, ankle and plantar 2+ and symmetric bilaterally. Pulses: dorsalis pedis, posterior tibial, femoral, popliteal, and radial 2+ and symmetric bilaterally.  Constitutional: Alert and in no acute distress, but well-appearing.   [de-identified] : 4 views of the right knee obtained the office today show no acute fracture or dislocation.  There is moderate medial joint space narrowing osteophyte formation and tricompartmental degenerative changes consistent Kellgren-Harvinder grade 2 changes

## 2024-01-24 NOTE — HISTORY OF PRESENT ILLNESS
[de-identified] : Patient is a 53-year-old female here today for evaluation of right knee pain has been going on for the past month.  Patient states that she underwent a urology procedure 1 week ago and during the procedure was given a cortisone injection by her provider.  She believes it was in her knee.  She states that she had a week of relief of the pain however is now begun to hurt her again.  Hurts over the medial aspect of the knee.  Hurts to go up and down stairs rising reseated position.  Had swelling in the leg and had an ultrasound to rule out DVT.  Denies any recent falls or trauma.  Is taking Tylenol for the pain

## 2024-01-27 ENCOUNTER — NON-APPOINTMENT (OUTPATIENT)
Age: 54
End: 2024-01-27

## 2024-01-31 ENCOUNTER — APPOINTMENT (OUTPATIENT)
Dept: ORTHOPEDIC SURGERY | Facility: CLINIC | Age: 54
End: 2024-01-31
Payer: MEDICARE

## 2024-01-31 VITALS
DIASTOLIC BLOOD PRESSURE: 84 MMHG | SYSTOLIC BLOOD PRESSURE: 134 MMHG | HEART RATE: 71 BPM | BODY MASS INDEX: 29.37 KG/M2 | HEIGHT: 64 IN | WEIGHT: 172 LBS

## 2024-01-31 PROCEDURE — 99213 OFFICE O/P EST LOW 20 MIN: CPT | Mod: 25

## 2024-01-31 PROCEDURE — 20610 DRAIN/INJ JOINT/BURSA W/O US: CPT | Mod: RT

## 2024-01-31 PROCEDURE — G2211 COMPLEX E/M VISIT ADD ON: CPT

## 2024-01-31 NOTE — PROCEDURE
[Injection] : Injection [Right] : of the right [Knee Joint] : knee joint [Osteoarthritis] : Osteoarthritis [Joint Pain] : Joint pain [Patient] : patient [Verbal Consent Obtained] : verbal consent was obtained prior to the procedure [Alcohol] : Alcohol [Ethyl Chloride Spray] : ethyl chloride spray was used as a topical anesthetic [Lateral] : lateral [22] : a 22-gauge [Bandage Applied] : a bandage [Tolerated Well] : The patient tolerated the procedure well [None] : none [No Strenuous Activity___day(s)] : avoid strenuous activity for [unfilled] day(s) [___ Week(s)] : in [unfilled] week(s) [de-identified] : Orthovisc # 1 Right Knee  Discussed at length with the patient the planned Orthovisc injection. The risks, benefits, convalescence and alternatives were reviewed. The possible side effects discussed included but were not limited to: pain, swelling, heat and redness. There symptoms are generally mild but if they are extensive then contact the office. Giving pain relievers by mouth such as NSAIDs or Tylenol can generally treat the reactions to Orthovisc. Rare cases of infection have been noted. Rash, hives and itching may occur post injection. If you have muscle pain or cramps, flushing and or swelling of the face, rapid heart beat, nausea, dizziness, fever, chills, headache, difficulty breathing, swelling in the arms or legs, or have a prickly feeling of your skin, contact a health care provider immediately.  Following this discussion, the knee was prepped with Betadine and under sterile condition the Orthovisc injection was performed with a 22 gauge needle. The needle was introduced into the joint, aspiration was performed to ensure intra-articular placement and the medication was injected. Upon withdrawal of the needle the site was cleaned with alcohol and a Band-Aid applied. The patient tolerated the injection well and there were no adverse effects. Post injection instructions included no strenuous activity for 24 hours, cryotherapy and if there are any adverse effects to contact the office. [FreeTextEntry8] : 2 mL Orthovisc (Lot #: [id number] | EXP: 02/28/2026)

## 2024-01-31 NOTE — HISTORY OF PRESENT ILLNESS
[Pain Location] : pain [] : right knee [Worsening] : worsening [___ mths] : [unfilled] month(s) ago [Standing] : standing [Constant] : ~He/She~ states the symptoms seem to be constant [Sitting] : worsened by sitting [Running] : worsened by running [Walking] : worsened by walking [Knee Flexion] : worsened with knee flexion [Knee Extension] : worsened with knee extension [Acetaminophen] : relieved by acetaminophen [de-identified] : Patient is a 53-year-old female here today for follow-up of right knee pain.  The patient came to clarify that she did not receive a cortisone injection by her urologist recently she states that she is still experiencing pain and dysfunction in her knee, her symptoms have not improved since her last visit..  Pain exacerbated by ambulating rising from a seated position and navigating stairs.  Reports swelling in the knee and anterior portion of the lower leg.  Unable to take anti-inflammatories due to kidney disease.  Has been using Voltaren gel and Tylenol.  Is here today to discuss cortisone versus viscosupplementation.  Denies any significant changes to her medical history since her last visit, recent injuries falls or trauma, numbness or tingling in the lower extremity, locking catching or buckling of the knee. [de-identified] : going up and down the stairs, rising from a seated position [de-identified] : Diclofenac Gel

## 2024-01-31 NOTE — DISCUSSION/SUMMARY
[Medication Risks Reviewed] : Medication risks reviewed [Surgical risks reviewed] : Surgical risks reviewed [1 Week] : in 1 week [de-identified] : Patient is a 53-year-old female with moderate right knee osteoarthritis presenting today for initial evaluation.  I recommended conservative treatment at this time.  A thorough discussion regarding cortisone versus viscosupplementation was had, the patient would like to proceed with viscosupplementation.  The patient received her 1st of 3 Orthovisc Gel injections in her right knee today. She tolerated the procedure well.  I recommended low impact activity and exercise.  I recommended physical therapy.  She may continue to use Voltaren gel as needed for pain.. I will see her back in 1 week for repeat gel injections.  All questions were asked and answered with the patient and her friend. The patient verbalized understanding the plan.

## 2024-01-31 NOTE — REASON FOR VISIT
[_______] : sanjay SINGH  for [Other: ____] : [unfilled] [FreeTextEntry2] : Lot #: [id number] | EXP: 02/28/2026

## 2024-01-31 NOTE — ADDENDUM
[FreeTextEntry1] : This note was written by Shirley Collado, acting as the  for Dr. Murillo. This note accurately reflects the work and decisions made by Dr. Murillo.

## 2024-01-31 NOTE — PHYSICAL EXAM
[Normal] : Gait: normal [de-identified] : Right knee exam shows mild effusion, ROM is 0-120 degrees, no instability, pain with Bettina, medial joint line tenderness.  5/5 motor strength in bilateral lower extremities. Sensory: Intact in bilateral lower extremities. DTRs: Biceps, brachioradialis, triceps, patellar, ankle and plantar 2+ and symmetric bilaterally. Pulses: dorsalis pedis, posterior tibial, femoral, popliteal, and radial 2+ and symmetric bilaterally.

## 2024-02-01 ENCOUNTER — APPOINTMENT (OUTPATIENT)
Dept: THORACIC SURGERY | Facility: CLINIC | Age: 54
End: 2024-02-01
Payer: MEDICARE

## 2024-02-01 VITALS
SYSTOLIC BLOOD PRESSURE: 130 MMHG | TEMPERATURE: 97.9 F | HEIGHT: 63.5 IN | HEART RATE: 84 BPM | WEIGHT: 220 LBS | RESPIRATION RATE: 16 BRPM | OXYGEN SATURATION: 98 % | BODY MASS INDEX: 38.5 KG/M2 | DIASTOLIC BLOOD PRESSURE: 85 MMHG

## 2024-02-01 PROCEDURE — 99213 OFFICE O/P EST LOW 20 MIN: CPT

## 2024-02-01 NOTE — DATA REVIEWED
[FreeTextEntry1] : Accession:                             [id number]  Collected Date/Time:                   10/4/2023 17:00 EDT Received Date/Time:                    10/5/2023 06:27 EDT  Surgical Pathology Report - Auth (Verified)  Specimen(s) Submitted 1  Biopsy antrum 2  Biopsy GE Junction 3  Biopsy mid esophagus  Final Diagnosis 1.  Gastric antrum, biopsy: -   Chronic inactive gastritis.  -   No Helicobacter pylori organisms identified with immunohistochemical stain.  2.  GE junction, biopsy: -   Squamous esophageal mucosa with no significant histopathology. -   No glandular epithelium is present.  3.  Esophagus, mid, biopsy: -   Squamous esophageal mucosa with no significant histopathology. Verified by: Carlitos Zuleta M.D. (Electronic Signature) Reported on: 10/10/23 14:32 EDT

## 2024-02-01 NOTE — HISTORY OF PRESENT ILLNESS
[FreeTextEntry1] : Ms. NATIVIDAD MORA is a 53-year female who presents today for follow-up. Previously, she has been followed for status post robot-assisted hiatal hernia repair with Toupet fundoplication on 4/24/2023. Postoperative endoscopy was performed on 10/4/23 due to complaints of food getting stuck, which was negative for any obvious abnormalities and biopsies showed only inactive gastritis with no significant pathology. She presents today for clinical checkup.   Additional past medical history includes HLD, HTN, Hypothyroidism, OCD, LORA, type 2 diabetes mellitus, gastroesophageal reflux disease and hiatal hernia.   Today, the patient reports that she continues to experience sensation of food getting "stuck" in her throat, but much improved since previous visits. She notices that her symptoms are not present when she chews food well, eats small bites, and eats slowly. She is pleased that she is no longer on anti-reflux medications.

## 2024-02-01 NOTE — PHYSICAL EXAM
[Sclera] : the sclera and conjunctiva were normal [Neck Appearance] : the appearance of the neck was normal [] : no respiratory distress [Respiration, Rhythm And Depth] : normal respiratory rhythm and effort [Auscultation Breath Sounds / Voice Sounds] : lungs were clear to auscultation bilaterally [Heart Rate And Rhythm] : heart rate was normal and rhythm regular [Heart Sounds] : normal S1 and S2 [Examination Of The Chest] : the chest was normal in appearance [Abdomen Tenderness] : non-tender [Abnormal Walk] : normal gait [Skin Color & Pigmentation] : normal skin color and pigmentation [Sensation] : the sensory exam was normal to light touch and pinprick [Motor Exam] : the motor exam was normal [Oriented To Time, Place, And Person] : oriented to person, place, and time [Impaired Insight] : insight and judgment were intact [Affect] : the affect was normal [Mood] : the mood was normal

## 2024-02-01 NOTE — REVIEW OF SYSTEMS
[As Noted in HPI] : as noted in HPI [Negative] : Heme/Lymph [Fever] : no fever [Chills] : no chills [Feeling Poorly] : not feeling poorly [Feeling Tired] : not feeling tired [Heart Rate Is Fast] : the heart rate was not fast [Chest Pain] : no chest pain [Palpitations] : no palpitations [Shortness Of Breath] : no shortness of breath [Wheezing] : no wheezing [Cough] : no cough [SOB on Exertion] : no shortness of breath during exertion

## 2024-02-01 NOTE — PHYSICAL EXAM
[No Acute Distress] : no acute distress [Normal Oropharynx] : normal oropharynx [IV] : Mallampati Class: IV [Normal Appearance] : normal appearance [Neck Circumference: ___] : neck circumference: [unfilled] [No Neck Mass] : no neck mass [Normal Rate/Rhythm] : normal rate/rhythm [Normal S1, S2] : normal s1, s2 [No Murmurs] : no murmurs [No Resp Distress] : no resp distress [Clear to Auscultation Bilaterally] : clear to auscultation bilaterally [No Abnormalities] : no abnormalities [Benign] : benign [Normal Gait] : normal gait [No Clubbing] : no clubbing [No Cyanosis] : no cyanosis [No Edema] : no edema [FROM] : FROM [Normal Color/ Pigmentation] : normal color/ pigmentation [No Focal Deficits] : no focal deficits [Oriented x3] : oriented x3 [Normal Affect] : normal affect cognition/decreased flexibility/pain/decreased strength

## 2024-02-01 NOTE — ASSESSMENT
[FreeTextEntry1] : Ms. MORA presents for a clinical follow up appointment. She is status post robot-assisted hiatal hernia repair with Toupet fundoplication from 4/24/23. Postoperative endoscopy was performed on 10/4/23 due to complaints of food getting stuck, which was negative for any obvious abnormalities and biopsies showed only inactive gastritis with no significant pathology.    We discussed postoperative expectations and progress, including diet, activity and habits that may also impact healing. We discussed that she continue to cut food into small pieces, chew the food well, and eat slowly.   Healing from this procedure is different for everyone. From a surgical perspective, I feel Ms. MORA is making good progress. I am confident in time she will continue to improve. I would like to monitor her progress and therefore recommend that she return to care in office in 6 months. All questions answered, patient verbalizes understanding.   PLAN - Return to care in office for clinical evaluation in 6 months, or sooner if needed.    I, Dr. Lr, personally performed the evaluation and management (E/M) services for this established patient who presents today with (a) new problem(s)/exacerbation of (an) existing condition(s). That E/M includes conducting the examination, assessing all new/exacerbated conditions, and establishing a new plan of care. Today, my ACP, Lina Vail NP, was here to observe my evaluation and management services for this new problem/exacerbated condition to be followed going forward.

## 2024-02-07 ENCOUNTER — APPOINTMENT (OUTPATIENT)
Dept: ORTHOPEDIC SURGERY | Facility: CLINIC | Age: 54
End: 2024-02-07
Payer: MEDICARE

## 2024-02-07 VITALS
WEIGHT: 220 LBS | BODY MASS INDEX: 38.5 KG/M2 | HEART RATE: 76 BPM | SYSTOLIC BLOOD PRESSURE: 132 MMHG | DIASTOLIC BLOOD PRESSURE: 82 MMHG | HEIGHT: 63.5 IN

## 2024-02-07 PROCEDURE — 20610 DRAIN/INJ JOINT/BURSA W/O US: CPT | Mod: RT

## 2024-02-07 NOTE — DISCUSSION/SUMMARY
[Medication Risks Reviewed] : Medication risks reviewed [Surgical risks reviewed] : Surgical risks reviewed [1 Week] : in 1 week [de-identified] : 54 year old female presents today status post her 2nd of 3 Orthovisc Gel Injections in the right knee. The patient tolerated the procedure well. I will see her back in 1 week for repeat injections.  All questions were asked and answered. The patient verbalized understanding the plan.

## 2024-02-07 NOTE — REASON FOR VISIT
[_______] : sanjay SINGH  for [Other: ____] : [unfilled] [FreeTextEntry2] : Lot #: [id number] | EXP: 02/28/2026

## 2024-02-07 NOTE — HISTORY OF PRESENT ILLNESS
[Pain Location] : pain [] : right knee [Worsening] : worsening [___ mths] : [unfilled] month(s) ago [Standing] : standing [Constant] : ~He/She~ states the symptoms seem to be constant [Sitting] : worsened by sitting [Running] : worsened by running [Walking] : worsened by walking [Knee Flexion] : worsened with knee flexion [Knee Extension] : worsened with knee extension [Acetaminophen] : relieved by acetaminophen [de-identified] : 54 year old female presents today for follow-up of her right knee pain and to receive her 2nd of 3 Orthovisc Gel Injections in the right knee. [de-identified] : going up and down the stairs, rising from a seated position [de-identified] : Diclofenac Gel

## 2024-02-07 NOTE — PROCEDURE
[Injection] : Injection [Right] : of the right [Knee Joint] : knee joint [Osteoarthritis] : Osteoarthritis [Joint Pain] : Joint pain [Patient] : patient [Verbal Consent Obtained] : verbal consent was obtained prior to the procedure [Alcohol] : Alcohol [Ethyl Chloride Spray] : ethyl chloride spray was used as a topical anesthetic [Lateral] : lateral [22] : a 22-gauge [Bandage Applied] : a bandage [Tolerated Well] : The patient tolerated the procedure well [None] : none [No Strenuous Activity___day(s)] : avoid strenuous activity for [unfilled] day(s) [___ Week(s)] : in [unfilled] week(s) [de-identified] : Orthovisc # 2 Right Knee  Discussed at length with the patient the planned Orthovisc injection. The risks, benefits, convalescence and alternatives were reviewed. The possible side effects discussed included but were not limited to: pain, swelling, heat and redness. There symptoms are generally mild but if they are extensive then contact the office. Giving pain relievers by mouth such as NSAIDs or Tylenol can generally treat the reactions to Orthovisc. Rare cases of infection have been noted. Rash, hives and itching may occur post injection. If you have muscle pain or cramps, flushing and or swelling of the face, rapid heart beat, nausea, dizziness, fever, chills, headache, difficulty breathing, swelling in the arms or legs, or have a prickly feeling of your skin, contact a health care provider immediately.  Following this discussion, the knee was prepped with Betadine and under sterile condition the Orthovisc injection was performed with a 22 gauge needle. The needle was introduced into the joint, aspiration was performed to ensure intra-articular placement and the medication was injected. Upon withdrawal of the needle the site was cleaned with alcohol and a Band-Aid applied. The patient tolerated the injection well and there were no adverse effects. Post injection instructions included no strenuous activity for 24 hours, cryotherapy and if there are any adverse effects to contact the office. [FreeTextEntry8] : 2 mL Orthovisc (Lot #: [id number] | EXP: 02/28/2026)

## 2024-02-14 ENCOUNTER — APPOINTMENT (OUTPATIENT)
Dept: ORTHOPEDIC SURGERY | Facility: CLINIC | Age: 54
End: 2024-02-14
Payer: MEDICARE

## 2024-02-14 PROCEDURE — 20610 DRAIN/INJ JOINT/BURSA W/O US: CPT | Mod: RT

## 2024-02-14 NOTE — HISTORY OF PRESENT ILLNESS
[Pain Location] : pain [] : right knee [Worsening] : worsening [___ mths] : [unfilled] month(s) ago [Standing] : standing [Constant] : ~He/She~ states the symptoms seem to be constant [Sitting] : worsened by sitting [Running] : worsened by running [Walking] : worsened by walking [Knee Flexion] : worsened with knee flexion [Knee Extension] : worsened with knee extension [Acetaminophen] : relieved by acetaminophen [de-identified] : Patient is a 54-year-old female here today for follow-up of right knee pain and to receive her 3rd of 3 Orthovisc Gel Injections in the right knee. [de-identified] : going up and down the stairs, rising from a seated position [de-identified] : Diclofenac Gel

## 2024-02-14 NOTE — PROCEDURE
[de-identified] : Orthovisc # 3 Right Knee  Discussed at length with the patient the planned Orthovisc injection. The risks, benefits, convalescence and alternatives were reviewed. The possible side effects discussed included but were not limited to: pain, swelling, heat and redness. There symptoms are generally mild but if they are extensive then contact the office. Giving pain relievers by mouth such as NSAIDs or Tylenol can generally treat the reactions to Orthovisc. Rare cases of infection have been noted. Rash, hives and itching may occur post injection. If you have muscle pain or cramps, flushing and or swelling of the face, rapid heart beat, nausea, dizziness, fever, chills, headache, difficulty breathing, swelling in the arms or legs, or have a prickly feeling of your skin, contact a health care provider immediately.  Following this discussion, the knee was prepped with Betadine and under sterile condition the Orthovisc injection was performed with a 22 gauge needle. The needle was introduced into the joint, aspiration was performed to ensure intra-articular placement and the medication was injected. Upon withdrawal of the needle the site was cleaned with alcohol and a Band-Aid applied. The patient tolerated the injection well and there were no adverse effects. Post injection instructions included no strenuous activity for 24 hours, cryotherapy and if there are any adverse effects to contact the office.

## 2024-02-14 NOTE — ADDENDUM
[FreeTextEntry1] : This note was written by Shirley Collado, acting as the  for CARLA Mattson. This note accurately reflects the work and decisions made by CARLA Mattson.

## 2024-02-14 NOTE — REASON FOR VISIT
[_______] : sanjay SINGH  for [Other: ____] : [unfilled] [FreeTextEntry2] : Lot #: [id number] | EXP: 02/28/2026

## 2024-02-14 NOTE — DISCUSSION/SUMMARY
[Medication Risks Reviewed] : Medication risks reviewed [Surgical risks reviewed] : Surgical risks reviewed [de-identified] : Patient is a 54-year-old female with moderate right knee osteoarthritis presenting today status post her 3 out of 3 Orthovisc Gel injections in her right knee. She tolerated the procedure well. I will see her back in 3 months for repeat injections.  All questions were asked and answered. The patient and her friend verbalized understanding the plan.

## 2024-02-16 ENCOUNTER — APPOINTMENT (OUTPATIENT)
Dept: UROGYNECOLOGY | Facility: CLINIC | Age: 54
End: 2024-02-16
Payer: MEDICARE

## 2024-02-16 PROCEDURE — 81003 URINALYSIS AUTO W/O SCOPE: CPT | Mod: QW

## 2024-02-16 PROCEDURE — 51701 INSERT BLADDER CATHETER: CPT | Mod: 59,58

## 2024-02-16 PROCEDURE — 99213 OFFICE O/P EST LOW 20 MIN: CPT | Mod: 24,25

## 2024-02-16 NOTE — PHYSICAL EXAM
[FreeTextEntry1] : General: Not in acute distress, alert and oriented x3. Abdomen: Soft, nontender, and nondistended. No obvious hepatosplenomegaly. Suprapubic trocar exit sites have healed well.  Pelvic Exam: Normal external female genitalia.  Cough stress test is negative.  Post void residual was checked with I/O cath and was 20 cc of clear urine.  Normal-appearing vaginal epithelium. A All vaginal compartments are well supported. [No Acute Distress] : in no acute distress

## 2024-02-16 NOTE — HISTORY OF PRESENT ILLNESS
[FreeTextEntry1] : Patient is a 53-year-old nulligravida with past medical history significant for obesity, hypertension, hyperlipidemia, diabetes, severe obstructive sleep apnea, exertional shortness of breath s/p cardiac cath, who Underwent retropubic mid urethral sling placement with cystoscopy on 1/16/2024.  She was discharged home with a Amaya catheter.  She passed the voiding trial on 1/19/2024.  She presents today for scheduled postop visit.  She is very happy with the results of the surgery.  She denies leaking urine.  She feels she is emptying her bladder well.  She she does report lower abdominal cramping sensation for almost a month.  She went to the emergency department approximately 3 weeks ago CT abdomen and pelvis was ordered.  She has 2 small nonobstructive renal stones

## 2024-02-16 NOTE — PROCEDURE
[Straight Catheterization] : insertion of a straight catheter [Urinary Frequency] : urinary frequency [Other ___] : [unfilled] [Patient] : the patient [None] : there were no complications with the catheter insertion [Culture] : culture [Urinalysis] : urinalysis [No Complications] : no complications [Tolerated Well] : the patient tolerated the procedure well [Post procedure instructions and information given] : Post procedure instructions and information were given and reviewed with patient. [0] : 0

## 2024-02-16 NOTE — ASSESSMENT
[FreeTextEntry1] : Patient has made excellent postop recovery.  She has normal postvoid residual however she is reporting bilateral lower abdominal/pelvic cramping sensation.  She denies postmenopausal bleeding.  I was unable to review her CT scan today due to IT issues

## 2024-02-16 NOTE — DISCUSSION/SUMMARY
[FreeTextEntry1] : All postop restrictions lifted.  She may resume physical therapy for her arthritis. Recommended pelvic ultrasound to exclude adnexal cyst and thickened endometrium.  She has risk factors for endometrial hyperplasia. Urine culture was ordered to exclude urinary tract infection.  If there is evidence of urinary tract infection, will recommend antibiotic such as Keflex 500 mg p.o. for 7 days Follow-up in 4 months

## 2024-02-19 LAB
APPEARANCE: ABNORMAL
BACTERIA UR CULT: NORMAL
BACTERIA: NEGATIVE /HPF
BILIRUBIN URINE: NEGATIVE
BLOOD URINE: NEGATIVE
CAST: 1 /LPF
COLOR: YELLOW
EPITHELIAL CELLS: 0 /HPF
GLUCOSE QUALITATIVE U: NEGATIVE MG/DL
KETONES URINE: NEGATIVE MG/DL
LEUKOCYTE ESTERASE URINE: NEGATIVE
MICROSCOPIC-UA: NORMAL
MUCUS: PRESENT
NITRITE URINE: NEGATIVE
PH URINE: 6
PROTEIN URINE: NORMAL MG/DL
RED BLOOD CELLS URINE: 0 /HPF
REVIEW: NORMAL
SPECIFIC GRAVITY URINE: 1.03
UROBILINOGEN URINE: 0.2 MG/DL
WHITE BLOOD CELLS URINE: 0 /HPF

## 2024-02-20 ENCOUNTER — APPOINTMENT (OUTPATIENT)
Dept: ORTHOPEDIC SURGERY | Facility: CLINIC | Age: 54
End: 2024-02-20

## 2024-03-13 ENCOUNTER — APPOINTMENT (OUTPATIENT)
Dept: ULTRASOUND IMAGING | Facility: CLINIC | Age: 54
End: 2024-03-13
Payer: MEDICARE

## 2024-03-13 PROCEDURE — 76830 TRANSVAGINAL US NON-OB: CPT

## 2024-03-14 ENCOUNTER — APPOINTMENT (OUTPATIENT)
Dept: THORACIC SURGERY | Facility: CLINIC | Age: 54
End: 2024-03-14
Payer: MEDICARE

## 2024-03-14 VITALS
OXYGEN SATURATION: 98 % | RESPIRATION RATE: 16 BRPM | HEART RATE: 81 BPM | SYSTOLIC BLOOD PRESSURE: 142 MMHG | DIASTOLIC BLOOD PRESSURE: 68 MMHG

## 2024-03-14 DIAGNOSIS — R10.31 RIGHT LOWER QUADRANT PAIN: ICD-10-CM

## 2024-03-14 DIAGNOSIS — R13.10 DYSPHAGIA, UNSPECIFIED: ICD-10-CM

## 2024-03-14 DIAGNOSIS — R10.32 RIGHT LOWER QUADRANT PAIN: ICD-10-CM

## 2024-03-14 PROCEDURE — 99214 OFFICE O/P EST MOD 30 MIN: CPT

## 2024-03-14 RX ORDER — BUDESONIDE AND FORMOTEROL FUMARATE DIHYDRATE 160; 4.5 UG/1; UG/1
AEROSOL RESPIRATORY (INHALATION)
Refills: 0 | Status: COMPLETED | COMMUNITY
End: 2024-03-14

## 2024-03-14 RX ORDER — OXYCODONE 5 MG/1
5 TABLET ORAL EVERY 6 HOURS
Qty: 6 | Refills: 0 | Status: COMPLETED | COMMUNITY
Start: 2024-01-16 | End: 2024-03-14

## 2024-03-15 PROBLEM — R10.31 ABDOMINAL CRAMPING, BILATERAL LOWER QUADRANT: Status: ACTIVE | Noted: 2024-02-16

## 2024-03-15 PROBLEM — R13.10 DYSPHAGIA: Status: ACTIVE | Noted: 2024-02-01

## 2024-03-15 NOTE — REVIEW OF SYSTEMS
[Feeling Poorly] : feeling poorly [Abdominal Pain] : abdominal pain [Vomiting] : vomiting [Heartburn] : heartburn [Negative] : Heme/Lymph [Feeling Tired] : not feeling tired [Chest Pain] : no chest pain [Palpitations] : no palpitations [Constipation] : no constipation [Diarrhea] : no diarrhea [Melena] : no melena [FreeTextEntry7] : dysphagia and GERD

## 2024-03-15 NOTE — DATA REVIEWED
[FreeTextEntry1] : Cardiac CT Scan from [facility] on 2/14/24 - No evidence of pulmonary embolism - Mild cardiomegaly - Mild to moderate hiatal hernia - Tiny calyceal stone in the lower pole of the left kidney

## 2024-03-15 NOTE — PHYSICAL EXAM
[Sclera] : the sclera and conjunctiva were normal [Neck Appearance] : the appearance of the neck was normal [Respiration, Rhythm And Depth] : normal respiratory rhythm and effort [Heart Rate And Rhythm] : heart rate was normal and rhythm regular [Examination Of The Chest] : the chest was normal in appearance [Bowel Sounds] : normal bowel sounds [Abdomen Soft] : soft [Abdomen Tenderness] : non-tender [Abnormal Walk] : normal gait [Skin Color & Pigmentation] : normal skin color and pigmentation [Sensation] : the sensory exam was normal to light touch and pinprick [Oriented To Time, Place, And Person] : oriented to person, place, and time [Impaired Insight] : insight and judgment were intact

## 2024-03-15 NOTE — HISTORY OF PRESENT ILLNESS
[FreeTextEntry1] : Ms. NATIVIDAD MORA is a 54 year female who presents today for follow-up. Previously, she has been followed status post robot-assisted hiatal hernia repair with Toupet fundoplication on 4/24/2023. Postoperative endoscopy was performed on 10/4/23 due to complaints of food getting stuck, which was negative for any obvious abnormalities and biopsies showed only inactive gastritis with no significant pathology. At her last appointment last month, she was continuing to improve slowly. She presents today for clinical checkup.  Additional past medical history includes HLD, HTN, Hypothyroidism, OCD, LORA, type 2 diabetes mellitus, gastroesophageal reflux disease and hiatal hernia.   Today, the patient reports having dysphagia for the past month and having food get caught in her chest. She had attributed it to GERD but then developed worsening pain and vomiting. She had been evaluated at [facility] and CT imaging revealed a mild to moderate hiatal hernia.

## 2024-03-15 NOTE — ASSESSMENT
[FreeTextEntry1] : Ms. NATIVIDAD MORA is a 54 year female who presents today for follow-up. Previously, she has been followed status post robot-assisted hiatal hernia repair with Toupet fundoplication on 4/24/2023. Postoperative endoscopy was performed on 10/4/23 due to complaints of food getting stuck, which was negative for any obvious abnormalities and biopsies showed only inactive gastritis with no significant pathology. At her last appointment last month, she was continuing to improve slowly.  Independent review of imaging and independent interpretation was performed at today's visit. There does appear to be a reoccurrence of her Hital hernia. Unfortunately this will require re-operative procedure to address the herniation. The procedure, hospital stay and recovery was discussed in detail. All risks, benefits, and alternatives discussed at length with patient. All questions addressed. Patient would like to proceed with surgical intervention as discussed.   Given her symptoms I would like for her to begin PPI therapy twice daily in preparation for intervention. She is agreeable and will proceed as discussed.    Preoperative checklist (Reviewed with patient in office) - Confirm allergies, including latex: PENICILLIN - Confirm pacemaker: NONE - Anticoagulation/antiplatelets noted and will be discontinued/continued: NONE - SGLT-2 Inhibitors (discontinued 3 days prior to surgery) or GLP-1 (discontinued 1 week prior to surgery): NONE - All other supplements, NSAIDs and fish oil were discussed and will be held one week before surgery  Testing: - Presurgical testing to be scheduled, which will include chest xray, electrocardiogram and standard labs  Surgical Plan: - Robotic re-operative hiatal hernia repair  - Start Omeprazole 20mg twice daily       I, Dr. Lr personally performed the evaluation and management (E/M) services for this patient. That E/M includes conducting the initial examination, assessing all conditions, and establishing the plan of care. Today, Solitario Barker NP was here to observe my evaluation and management services for this patient.

## 2024-03-28 ENCOUNTER — APPOINTMENT (OUTPATIENT)
Dept: ENDOCRINOLOGY | Facility: CLINIC | Age: 54
End: 2024-03-28
Payer: MEDICARE

## 2024-03-28 VITALS
SYSTOLIC BLOOD PRESSURE: 162 MMHG | DIASTOLIC BLOOD PRESSURE: 100 MMHG | WEIGHT: 227 LBS | TEMPERATURE: 97 F | HEART RATE: 71 BPM | BODY MASS INDEX: 39.72 KG/M2 | OXYGEN SATURATION: 98 % | HEIGHT: 63.5 IN

## 2024-03-28 LAB — GLUCOSE BLDC GLUCOMTR-MCNC: 128

## 2024-03-28 PROCEDURE — 99204 OFFICE O/P NEW MOD 45 MIN: CPT

## 2024-03-28 NOTE — HISTORY OF PRESENT ILLNESS
[FreeTextEntry1] : 54 year old women with h/o obesity (BMI 39), LORA (uses CPAP), T2DM, hypothyroidism, HLD, IBS, GERD and HTN presented to establish care. Never followed up with Endocrinology before.   Labs Feb 2024- eGFR 61, A1C 6.2%, TSH 6.6, FT4  1.1, LDL-C 44, Tg 127  - T2DM  Dx'ed in her 30's Admits to not following diabetic diet, eats a lot sweats, pasta etc.  Currently on metformin 500 mg BID, and Glimepiride 2 mg once a day with breakfast.  Reports FBG is usually in 130's, Occasionally has BG readings in 60 (feels light headed at those times).   Has peripheral neuropathy.  Follows up with nephrology, podiatry, and ophthalmology.   - Hypothyroidism  She has been taking Levothyroxine 150 mcg daily for past year. Takes all her morning medications including omeprazole together and eats shortly after.   - Bone Health  Doesn't consume dairy due to IBS.  Doesn't take Vit D tablet .

## 2024-03-28 NOTE — ASSESSMENT
[FreeTextEntry1] :  1- T2DM  Plan:  - Stop Glimepiride to avoid episodes of hypoglycemia - c/w metformin 500 mg BID  - will start Ozempic. Discussed SE's of theses class of medications.  - Appreciate CDE consult. Pt is interested to learn more about diabetic diet.   - Will check labs in 2 month  - F/U in 3 month   2- Hypothyroidism  Currently on Levothyroxine 150 mcg daily  Plan:  - Discussed correct instructions to take levothyroxine. She was advised to take Omeprazole at noon instead of morning. will repeat labs in 2 month    3- Obesity with BMI 39 on CPAP s/p cholecystectomy  - Anticipate improved weight management with diet modification and Ozempic   4- Bone health  No dairy consumption due to IBS No Vit D  Plan:  - Will check Vit D level

## 2024-03-28 NOTE — REASON FOR VISIT
[DM Type 2] : DM Type 2 [Initial Evaluation] : an initial evaluation [Hypothyroidism] : hypothyroidism [Weight Management/Obesity] : weight management/obesity

## 2024-04-11 ENCOUNTER — APPOINTMENT (OUTPATIENT)
Dept: NEPHROLOGY | Facility: CLINIC | Age: 54
End: 2024-04-11
Payer: MEDICARE

## 2024-04-11 VITALS
DIASTOLIC BLOOD PRESSURE: 68 MMHG | OXYGEN SATURATION: 97 % | HEART RATE: 72 BPM | RESPIRATION RATE: 16 BRPM | BODY MASS INDEX: 38.32 KG/M2 | SYSTOLIC BLOOD PRESSURE: 112 MMHG | WEIGHT: 219 LBS | HEIGHT: 63.5 IN

## 2024-04-11 PROCEDURE — 99215 OFFICE O/P EST HI 40 MIN: CPT

## 2024-04-11 RX ORDER — GLIMEPIRIDE 2 MG/1
2 TABLET ORAL
Qty: 90 | Refills: 0 | Status: DISCONTINUED | COMMUNITY
Start: 2022-07-01 | End: 2024-04-11

## 2024-04-11 NOTE — HISTORY OF PRESENT ILLNESS
[FreeTextEntry1] : Patient is a 52 year old female with history of HTN, DM for 20+ years, obesity, microalbuminuria; here for transitional visit within 7 days of discharge; called by me within 24 hours; was at [facility] [place] 11/11/22-11/12/22; found to have elevated SCr of 2.0 downtrended to 1.4 after hydration due to N/V/D; likely DMN. No complaints at current.   Current: Pt seen/examined; no complaints; Cr stable;

## 2024-04-11 NOTE — ASSESSMENT
[FreeTextEntry1] : 1) CKD III 2) HTN 3) DM 4) Microalbuminuria  Stop all NSAIDs Labs reviewed; Cr down to 1.07 Proteinuria minimal Will hold off on ACEI or ARB or SGLT2 inhibitor Have discussed weight loss and exercise regimen with patient in detail  RTC 1 year

## 2024-04-15 ENCOUNTER — APPOINTMENT (OUTPATIENT)
Dept: ENDOCRINOLOGY | Facility: CLINIC | Age: 54
End: 2024-04-15
Payer: MEDICARE

## 2024-04-15 PROCEDURE — 97802 MEDICAL NUTRITION INDIV IN: CPT

## 2024-04-22 ENCOUNTER — OUTPATIENT (OUTPATIENT)
Dept: OUTPATIENT SERVICES | Facility: HOSPITAL | Age: 54
LOS: 1 days | End: 2024-04-22
Payer: MEDICARE

## 2024-04-22 VITALS
TEMPERATURE: 97 F | WEIGHT: 218.48 LBS | HEART RATE: 92 BPM | DIASTOLIC BLOOD PRESSURE: 70 MMHG | OXYGEN SATURATION: 96 % | SYSTOLIC BLOOD PRESSURE: 138 MMHG | HEIGHT: 63 IN | RESPIRATION RATE: 18 BRPM

## 2024-04-22 DIAGNOSIS — K44.9 DIAPHRAGMATIC HERNIA WITHOUT OBSTRUCTION OR GANGRENE: ICD-10-CM

## 2024-04-22 DIAGNOSIS — I10 ESSENTIAL (PRIMARY) HYPERTENSION: ICD-10-CM

## 2024-04-22 DIAGNOSIS — Z01.818 ENCOUNTER FOR OTHER PREPROCEDURAL EXAMINATION: ICD-10-CM

## 2024-04-22 DIAGNOSIS — Z13.89 ENCOUNTER FOR SCREENING FOR OTHER DISORDER: ICD-10-CM

## 2024-04-22 DIAGNOSIS — E11.9 TYPE 2 DIABETES MELLITUS WITHOUT COMPLICATIONS: ICD-10-CM

## 2024-04-22 DIAGNOSIS — Z90.49 ACQUIRED ABSENCE OF OTHER SPECIFIED PARTS OF DIGESTIVE TRACT: Chronic | ICD-10-CM

## 2024-04-22 DIAGNOSIS — Z29.9 ENCOUNTER FOR PROPHYLACTIC MEASURES, UNSPECIFIED: ICD-10-CM

## 2024-04-22 DIAGNOSIS — E03.9 HYPOTHYROIDISM, UNSPECIFIED: ICD-10-CM

## 2024-04-22 DIAGNOSIS — Z98.890 OTHER SPECIFIED POSTPROCEDURAL STATES: Chronic | ICD-10-CM

## 2024-04-22 DIAGNOSIS — Z96.82 PRESENCE OF NEUROSTIMULATOR: Chronic | ICD-10-CM

## 2024-04-22 LAB
A1C WITH ESTIMATED AVERAGE GLUCOSE RESULT: 6.3 % — HIGH (ref 4–5.6)
ANION GAP SERPL CALC-SCNC: 15 MMOL/L — SIGNIFICANT CHANGE UP (ref 5–17)
APTT BLD: 31.3 SEC — SIGNIFICANT CHANGE UP (ref 24.5–35.6)
BASOPHILS # BLD AUTO: 0.09 K/UL — SIGNIFICANT CHANGE UP (ref 0–0.2)
BASOPHILS NFR BLD AUTO: 1.2 % — SIGNIFICANT CHANGE UP (ref 0–2)
BLD GP AB SCN SERPL QL: SIGNIFICANT CHANGE UP
BUN SERPL-MCNC: 16.4 MG/DL — SIGNIFICANT CHANGE UP (ref 8–20)
CALCIUM SERPL-MCNC: 9.7 MG/DL — SIGNIFICANT CHANGE UP (ref 8.4–10.5)
CHLORIDE SERPL-SCNC: 105 MMOL/L — SIGNIFICANT CHANGE UP (ref 96–108)
CO2 SERPL-SCNC: 22 MMOL/L — SIGNIFICANT CHANGE UP (ref 22–29)
CREAT SERPL-MCNC: 1.11 MG/DL — SIGNIFICANT CHANGE UP (ref 0.5–1.3)
EGFR: 58 ML/MIN/1.73M2 — LOW
EOSINOPHIL # BLD AUTO: 0.49 K/UL — SIGNIFICANT CHANGE UP (ref 0–0.5)
EOSINOPHIL NFR BLD AUTO: 6.6 % — HIGH (ref 0–6)
ESTIMATED AVERAGE GLUCOSE: 134 MG/DL — HIGH (ref 68–114)
GLUCOSE SERPL-MCNC: 106 MG/DL — HIGH (ref 70–99)
HCT VFR BLD CALC: 39 % — SIGNIFICANT CHANGE UP (ref 34.5–45)
HGB BLD-MCNC: 13 G/DL — SIGNIFICANT CHANGE UP (ref 11.5–15.5)
IMM GRANULOCYTES NFR BLD AUTO: 0.3 % — SIGNIFICANT CHANGE UP (ref 0–0.9)
INR BLD: 0.94 RATIO — SIGNIFICANT CHANGE UP (ref 0.85–1.18)
LYMPHOCYTES # BLD AUTO: 1.73 K/UL — SIGNIFICANT CHANGE UP (ref 1–3.3)
LYMPHOCYTES # BLD AUTO: 23.3 % — SIGNIFICANT CHANGE UP (ref 13–44)
MCHC RBC-ENTMCNC: 29.1 PG — SIGNIFICANT CHANGE UP (ref 27–34)
MCHC RBC-ENTMCNC: 33.3 GM/DL — SIGNIFICANT CHANGE UP (ref 32–36)
MCV RBC AUTO: 87.4 FL — SIGNIFICANT CHANGE UP (ref 80–100)
MONOCYTES # BLD AUTO: 0.53 K/UL — SIGNIFICANT CHANGE UP (ref 0–0.9)
MONOCYTES NFR BLD AUTO: 7.1 % — SIGNIFICANT CHANGE UP (ref 2–14)
NEUTROPHILS # BLD AUTO: 4.57 K/UL — SIGNIFICANT CHANGE UP (ref 1.8–7.4)
NEUTROPHILS NFR BLD AUTO: 61.5 % — SIGNIFICANT CHANGE UP (ref 43–77)
PLATELET # BLD AUTO: 220 K/UL — SIGNIFICANT CHANGE UP (ref 150–400)
POTASSIUM SERPL-MCNC: 4.5 MMOL/L — SIGNIFICANT CHANGE UP (ref 3.5–5.3)
POTASSIUM SERPL-SCNC: 4.5 MMOL/L — SIGNIFICANT CHANGE UP (ref 3.5–5.3)
PROTHROM AB SERPL-ACNC: 10.5 SEC — SIGNIFICANT CHANGE UP (ref 9.5–13)
RBC # BLD: 4.46 M/UL — SIGNIFICANT CHANGE UP (ref 3.8–5.2)
RBC # FLD: 12.7 % — SIGNIFICANT CHANGE UP (ref 10.3–14.5)
SODIUM SERPL-SCNC: 142 MMOL/L — SIGNIFICANT CHANGE UP (ref 135–145)
WBC # BLD: 7.43 K/UL — SIGNIFICANT CHANGE UP (ref 3.8–10.5)
WBC # FLD AUTO: 7.43 K/UL — SIGNIFICANT CHANGE UP (ref 3.8–10.5)

## 2024-04-22 PROCEDURE — 93010 ELECTROCARDIOGRAM REPORT: CPT

## 2024-04-22 PROCEDURE — 93005 ELECTROCARDIOGRAM TRACING: CPT

## 2024-04-22 PROCEDURE — G0463: CPT

## 2024-04-22 RX ORDER — FAMOTIDINE 10 MG/ML
1 INJECTION INTRAVENOUS
Refills: 0 | DISCHARGE

## 2024-04-22 RX ORDER — GLIMEPIRIDE 1 MG
1 TABLET ORAL
Refills: 0 | DISCHARGE

## 2024-04-22 RX ORDER — IRBESARTAN 75 MG/1
1 TABLET ORAL
Refills: 0 | DISCHARGE

## 2024-04-22 RX ORDER — GABAPENTIN 400 MG/1
2 CAPSULE ORAL
Refills: 0 | DISCHARGE

## 2024-04-22 NOTE — H&P PST ADULT - MUSCULOSKELETAL COMMENTS
left 5th metatarsal fracture 2 weeks ago, right knee pain left foot in surgical shoe s/p 5th metatarsal fracture

## 2024-04-22 NOTE — H&P PST ADULT - HISTORY OF PRESENT ILLNESS
Patient is a 54 year old  female presenting today for PST, PMH includes HLD, HTN, Hypothyroidism, OCD, LORA, type 2 diabetes mellitus, gastroesophageal reflux disease and hiatal hernia. She is s/p robot-assisted hiatal hernia repair with Toupet fundoplication on 4/24/2023. As per chart "Postoperative endoscopy was performed on 10/4/23 due to complaints of food getting stuck, which was negative for any obvious abnormalities and biopsies showed only inactive gastritis with no significant pathology".     Today, the patient reports having dysphagia for the past month and having food get caught in her chest. She had attributed it to GERD but then developed worsening pain and vomiting. She had been evaluated at [facility] and CT imaging revealed a mild to moderate hiatal hernia.  Patient is a 54 year old  female presenting today for PST, PMH includes HLD, HTN, Hypothyroidism, OCD, LORA, type 2 diabetes mellitus, gastroesophageal reflux disease and hiatal hernia. She is s/p robot-assisted hiatal hernia repair with Toupet fundoplication on 4/24/2023. As per chart "Postoperative endoscopy was performed on 10/4/23 due to complaints of food getting stuck, which was negative for any obvious abnormalities and biopsies showed only inactive gastritis with no significant pathology".     Today, the patient reports having dysphagia for the past month and having food get caught in her chest. She had attributed it to GERD but then developed worsening pain and vomiting. She had been evaluated at [place]  and CT in Fev 2024 imaging revealed a mild to moderate hiatal hernia.  Patient is a 54 year old  female presenting today for PST, PMH includes HLD, HTN, Hypothyroidism, OCD, LORA, type 2 diabetes mellitus, gastroesophageal reflux disease and hiatal hernia. She is s/p robot-assisted hiatal hernia repair with Toupet fundoplication on 4/24/2023. As per chart "Postoperative endoscopy was performed on 10/4/23 due to complaints of food getting stuck, which was negative for any obvious abnormalities and biopsies showed only inactive gastritis with no significant pathology". Patient reports nausea and dry heaving, denies vomiting. Reports felling like food gets stuck when she eats. States she was seen in [facility] in Feb. 2024 due to worsening symptoms, and a CT was performed, as per patient CT demonstrated  a mild to moderate hiatal hernia. She denies abdominal pain, fever or chills. She is now scheduled for upper endoscopy re-do hiatal hernia repair with fundoplication on 5/10/24 with Dr. Lr pending medical and cardiac clearance.

## 2024-04-22 NOTE — H&P PST ADULT - PROBLEM SELECTOR PLAN 4
BP today 138/70  Continue medication.   EKG performed.  Patient instructed to take morning blood pressure medications with a sip of water,  verbalized understanding. Cardiac clearance pending.

## 2024-04-22 NOTE — H&P PST ADULT - NSICDXPASTSURGICALHX_GEN_ALL_CORE_FT
PAST SURGICAL HISTORY:  History of cholecystectomy     History of repair of hiatal hernia     History of suburethral sling procedure     Sacral nerve stimulator present

## 2024-04-22 NOTE — H&P PST ADULT - ASSESSMENT
This is a       CAPRINI SCORE    AGE RELATED RISK FACTORS                                                             [ ] Age 41-60 years                                            (1 Point)  [ ] Age: 61-74 years                                           (2 Points)                 [ ] Age= 75 years                                                (3 Points)             DISEASE RELATED RISK FACTORS                                                       [ ] Edema in the lower extremities                 (1 Point)                     [ ] Varicose veins                                               (1 Point)                                 [ ] BMI > 25 Kg/m2                                            (1 Point)                                  [ ] Serious infection (ie PNA)                            (1 Point)                     [ ] Lung disease ( COPD, Emphysema)            (1 Point)                                                                          [ ] Acute myocardial infarction                         (1 Point)                  [ ] Congestive heart failure (in the previous month)  (1 Point)         [ ] Inflammatory bowel disease                            (1 Point)                  [ ] Central venous access, PICC or Port               (2 points)       (within the last month)                                                                [ ] Stroke (in the previous month)                        (5 Points)    [ ] Previous or present malignancy                       (2 points)                                                                                                                                                         HEMATOLOGY RELATED FACTORS                                                         [ ] Prior episodes of VTE                                     (3 Points)                     [ ] Positive family history for VTE                      (3 Points)                  [ ] Prothrombin 55973 A                                     (3 Points)                     [ ] Factor V Leiden                                                (3 Points)                        [ ] Lupus anticoagulants                                      (3 Points)                                                           [ ] Anticardiolipin antibodies                              (3 Points)                                                       [ ] High homocysteine in the blood                   (3 Points)                                             [ ] Other congenital or acquired thrombophilia      (3 Points)                                                [ ] Heparin induced thrombocytopenia                  (3 Points)                                        MOBILITY RELATED FACTORS  [ ] Bed rest                                                         (1 Point)  [ ] Plaster cast                                                    (2 points)  [ ] Bed bound for more than 72 hours           (2 Points)    GENDER SPECIFIC FACTORS  [ ] Pregnancy or had a baby within the last month   (1 Point)  [ ] Post-partum < 6 weeks                                   (1 Point)  [ ] Hormonal therapy  or oral contraception   (1 Point)  [ ] History of pregnancy complications              (1 point)  [ ] Unexplained or recurrent              (1 Point)    OTHER RISK FACTORS                                           (1 Point)  [ ] BMI >40, smoking, diabetes requiring insulin, chemotherapy  blood transfusions and length of surgery over 2 hours    SURGERY RELATED RISK FACTORS  [ ]  Section within the last month     (1 Point)  [ ] Minor surgery                                                  (1 Point)  [ ] Arthroscopic surgery                                       (2 Points)  [ ] Planned major surgery lasting more            (2 Points)      than 45 minutes     [ ] Elective hip or knee joint replacement       (5 points)       surgery                                                TRAUMA RELATED RISK FACTORS  [ ] Fracture of the hip, pelvis, or leg                       (5 Points)  [ ] Spinal cord injury resulting in paralysis             (5 points)       (in the previous month)    [ ] Paralysis  (less than 1 month)                             (5 Points)  [ ] Multiple Trauma within 1 month                        (5 Points)    Total Score [        ]    Caprini Score 0-2: Low Risk, NO VTE prophylaxis required for most patients, encourage ambulation  Caprini Score 3-6: Moderate Risk , pharmacologic VTE prophylaxis is indicated for most patients (in the absence of contraindications)  Caprini Score Greater than or =7: High risk, pharmocologic VTE prophylaxis indicated for most patients (in the absence of contraindications)        OPIOID RISK TOOL    ANALILIA EACH BOX THAT APPLIES AND ADD TOTALS AT THE END    FAMILY HISTORY OF SUBSTANCE ABUSE                 FEMALE         MALE                                                Alcohol                             [  ]1 pt          [  ]3pts                                               Illegal Durgs                     [  ]2 pts        [  ]3pts                                               Rx Drugs                           [  ]4 pts        [  ]4 pts    PERSONAL HISTORY OF SUBSTANCE ABUSE                                                                                          Alcohol                             [  ]3 pts       [  ]3 pts                                               Illegal Drugs                     [  ]4 pts        [  ]4 pts                                               Rx Drugs                           [  ]5 pts        [  ]5 pts    AGE BETWEEN 16-45 YEARS                                      [  ]1 pt         [  ]1 pt    HISTORY OF PREADOLESCENT   SEXUAL ABUSE                                                             [  ]3 pts        [  ]0pts    PSYCHOLOGICAL DISEASE                     ADD, OCD, Bipolar, Schizophrenia        [  ]2 pts         [  ]2 pts                      Depression                                               [  ]1 pt           [  ]1 pt           SCORING TOTAL   (add numbers and type here)              (***)                                     A score of 3 or lower indicated LOW risk for future opioid abuse  A score of 4 to 7 indicated moderate risk for future opioid abuse  A score of 8 or higher indicates a high risk for opioid abuse                           This is a pleasant obese 54 year old  female, in NAD  presenting today for PST, PMH includes HLD, HTN, Hypothyroidism, OCD, LORA, type 2 diabetes mellitus, gastroesophageal reflux disease and hiatal hernia. She is s/p robot-assisted hiatal hernia repair with Toupet fundoplication on 2023. As per chart "Postoperative endoscopy was performed on 10/4/23 due to complaints of food getting stuck, which was negative for any obvious abnormalities and biopsies showed only inactive gastritis with no significant pathology". Patient reports nausea and dry heaving, denies vomiting. Reports felling like food gets stuck when she eats. States she was seen in [facility] in 2024 due to worsening symptoms, and a CT was performed, as per patient CT demonstrated  a mild to moderate hiatal hernia. She denies abdominal pain, fever or chills. She is now scheduled for upper endoscopy re-do hiatal hernia repair with fundoplication on 5/10/24 with Dr. Lr pending medical and cardiac clearance.         CAPRINI SCORE    AGE RELATED RISK FACTORS                                                             [X ] Age 41-60 years                                            (1 Point)  [ ] Age: 61-74 years                                           (2 Points)                 [ ] Age= 75 years                                                (3 Points)             DISEASE RELATED RISK FACTORS                                                       [ ] Edema in the lower extremities                 (1 Point)                     [ ] Varicose veins                                               (1 Point)                                 [X ] BMI > 25 Kg/m2                                            (1 Point)                                  [ ] Serious infection (ie PNA)                            (1 Point)                     [ ] Lung disease ( COPD, Emphysema)            (1 Point)                                                                          [ ] Acute myocardial infarction                         (1 Point)                  [ ] Congestive heart failure (in the previous month)  (1 Point)         [ ] Inflammatory bowel disease                            (1 Point)                  [ ] Central venous access, PICC or Port               (2 points)       (within the last month)                                                                [ ] Stroke (in the previous month)                        (5 Points)    [ ] Previous or present malignancy                       (2 points)                                                                                                                                                         HEMATOLOGY RELATED FACTORS                                                         [ ] Prior episodes of VTE                                     (3 Points)                     [ ] Positive family history for VTE                      (3 Points)                  [ ] Prothrombin 54720 A                                     (3 Points)                     [ ] Factor V Leiden                                                (3 Points)                        [ ] Lupus anticoagulants                                      (3 Points)                                                           [ ] Anticardiolipin antibodies                              (3 Points)                                                       [ ] High homocysteine in the blood                   (3 Points)                                             [ ] Other congenital or acquired thrombophilia      (3 Points)                                                [ ] Heparin induced thrombocytopenia                  (3 Points)                                        MOBILITY RELATED FACTORS  [ ] Bed rest                                                         (1 Point)  [ ] Plaster cast                                                    (2 points)  [ ] Bed bound for more than 72 hours           (2 Points)    GENDER SPECIFIC FACTORS  [ ] Pregnancy or had a baby within the last month   (1 Point)  [ ] Post-partum < 6 weeks                                   (1 Point)  [ ] Hormonal therapy  or oral contraception   (1 Point)  [ ] History of pregnancy complications              (1 point)  [ ] Unexplained or recurrent              (1 Point)    OTHER RISK FACTORS                                           (1 Point)  [X ] BMI >40, smoking, diabetes requiring insulin, chemotherapy  blood transfusions and length of surgery over 2 hours    SURGERY RELATED RISK FACTORS  [ ]  Section within the last month     (1 Point)  [ ] Minor surgery                                                  (1 Point)  [ ] Arthroscopic surgery                                       (2 Points)  [X ] Planned major surgery lasting more            (2 Points)      than 45 minutes     [ ] Elective hip or knee joint replacement       (5 points)       surgery                                                TRAUMA RELATED RISK FACTORS  [ ] Fracture of the hip, pelvis, or leg                       (5 Points)  [ ] Spinal cord injury resulting in paralysis             (5 points)       (in the previous month)    [ ] Paralysis  (less than 1 month)                             (5 Points)  [ ] Multiple Trauma within 1 month                        (5 Points)    Total Score [    5    ]    Caprini Score 0-2: Low Risk, NO VTE prophylaxis required for most patients, encourage ambulation  Caprini Score 3-6: Moderate Risk , pharmacologic VTE prophylaxis is indicated for most patients (in the absence of contraindications)  Caprini Score Greater than or =7: High risk, pharmocologic VTE prophylaxis indicated for most patients (in the absence of contraindications)        OPIOID RISK TOOL    ANALILIA EACH BOX THAT APPLIES AND ADD TOTALS AT THE END    FAMILY HISTORY OF SUBSTANCE ABUSE                 FEMALE         MALE                                                Alcohol                             [  ]1 pt          [  ]3pts                                               Illegal Durgs                     [  ]2 pts        [  ]3pts                                               Rx Drugs                           [  ]4 pts        [  ]4 pts    PERSONAL HISTORY OF SUBSTANCE ABUSE                                                                                          Alcohol                             [  ]3 pts       [  ]3 pts                                               Illegal Drugs                     [  ]4 pts        [  ]4 pts                                               Rx Drugs                           [  ]5 pts        [  ]5 pts    AGE BETWEEN 16-45 YEARS                                      [  ]1 pt         [  ]1 pt    HISTORY OF PREADOLESCENT   SEXUAL ABUSE                                                             [  ]3 pts        [  ]0pts    PSYCHOLOGICAL DISEASE                     ADD, OCD, Bipolar, Schizophrenia        [  ]2 pts         [  ]2 pts                      Depression                                               [ X ]1 pt           [  ]1 pt           SCORING TOTAL   (add numbers and type here)              (*1**)                                     A score of 3 or lower indicated LOW risk for future opioid abuse  A score of 4 to 7 indicated moderate risk for future opioid abuse  A score of 8 or higher indicates a high risk for opioid abuse

## 2024-04-22 NOTE — H&P PST ADULT - PROBLEM SELECTOR PLAN 2
A1C level performed  Finger stick on day of procedure.  Patient instructed as follows;  Last dose of Ozempic on 4/29, no Ozempic on 5/6  Last dose of Metformin on 5/9, no Metformin on 5/10  Patient verbalized understanding  Instructions provided in writing and verbally  Medical clearance pending

## 2024-04-22 NOTE — H&P PST ADULT - PROBLEM SELECTOR PLAN 1
Scheduled for upper endoscopy re-do hiatal hernia repair with fundoplication on 5/10/24 with Dr. Lr pending medical and cardiac clearance.   Labs and EKG performed  Written and verbal instructions provided  Patient educated on surgical scrub, preadmission instructions, clearance and day of procedure medications, verbalizes understanding.  Patient instructed to stop vitamins/supplements/herbal medications/ASA/NSAIDS for one week prior to surgery and discuss with PMD, verbalized understanding.  Patient verbalized understanding of instructions and was given the opportunity to ask questions and have them answered.  Out patient medications reviewed  and verified with patient.

## 2024-04-23 LAB
T3 SERPL-MCNC: 109 NG/DL — SIGNIFICANT CHANGE UP (ref 80–200)
T4 AB SER-ACNC: 8.7 UG/DL — SIGNIFICANT CHANGE UP (ref 4.5–12)
TSH SERPL-MCNC: 7.07 UIU/ML — HIGH (ref 0.27–4.2)

## 2024-04-29 ENCOUNTER — APPOINTMENT (OUTPATIENT)
Dept: CARDIOLOGY | Facility: CLINIC | Age: 54
End: 2024-04-29
Payer: MEDICARE

## 2024-04-29 VITALS
HEIGHT: 63.5 IN | OXYGEN SATURATION: 98 % | DIASTOLIC BLOOD PRESSURE: 76 MMHG | HEART RATE: 75 BPM | RESPIRATION RATE: 14 BRPM | BODY MASS INDEX: 38.67 KG/M2 | WEIGHT: 221 LBS | SYSTOLIC BLOOD PRESSURE: 126 MMHG

## 2024-04-29 DIAGNOSIS — Z71.89 OTHER SPECIFIED COUNSELING: ICD-10-CM

## 2024-04-29 DIAGNOSIS — R06.02 SHORTNESS OF BREATH: ICD-10-CM

## 2024-04-29 DIAGNOSIS — R94.39 ABNORMAL RESULT OF OTHER CARDIOVASCULAR FUNCTION STUDY: ICD-10-CM

## 2024-04-29 DIAGNOSIS — R06.09 OTHER FORMS OF DYSPNEA: ICD-10-CM

## 2024-04-29 DIAGNOSIS — K31.84 GASTROPARESIS: ICD-10-CM

## 2024-04-29 DIAGNOSIS — I10 ESSENTIAL (PRIMARY) HYPERTENSION: ICD-10-CM

## 2024-04-29 DIAGNOSIS — G47.33 OBSTRUCTIVE SLEEP APNEA (ADULT) (PEDIATRIC): ICD-10-CM

## 2024-04-29 PROBLEM — E03.9 HYPOTHYROIDISM, UNSPECIFIED: Chronic | Status: ACTIVE | Noted: 2024-04-22

## 2024-04-29 PROCEDURE — G2211 COMPLEX E/M VISIT ADD ON: CPT

## 2024-04-29 PROCEDURE — 99214 OFFICE O/P EST MOD 30 MIN: CPT

## 2024-04-29 RX ORDER — LEVOTHYROXINE SODIUM 0.14 MG/1
137 TABLET ORAL DAILY
Refills: 0 | Status: DISCONTINUED | COMMUNITY
Start: 2022-03-28 | End: 2024-04-29

## 2024-04-29 RX ORDER — HYALURONATE SODIUM 30 MG/2 ML
30 SYRINGE (ML) INTRAARTICULAR
Qty: 3 | Refills: 0 | Status: DISCONTINUED | OUTPATIENT
Start: 2024-01-31 | End: 2024-04-29

## 2024-04-29 RX ORDER — ATORVASTATIN CALCIUM 20 MG/1
20 TABLET, FILM COATED ORAL
Qty: 90 | Refills: 1 | Status: ACTIVE | COMMUNITY
Start: 2023-03-27 | End: 1900-01-01

## 2024-04-29 NOTE — DISCUSSION/SUMMARY
[FreeTextEntry1] : To review, Julianna is a very pleasant 54-year-old female with following active issues.  Hypertension: Well-controlled on current meds.  Continue same.  Hyperlipidemia: Tolerating statin therapy.  Controlled LDL  Diabetes: Continue follow-up with endocrinology.  Last A1c was 6.3.  **PREOP: Mild shortness of breath on exertion has been stable.  Overall LVEF is normal.  Most likely this is multifactorial related to obesity and physical conditioning.  She has history of normal coronary arteries via cardiac catheterization 2022.  EKG today has no evidence of CAD or ischemia.  she is status post fundoplication and is now scheduled to undergo further repair of hiatus hernia.  Also, has gastroparesis on her list of diagnosis.  Patient should be low risk for major cardiovascular events with anticipated surgery.  Patient should continue cardiac medications without interruption.  The patient is not on any antiplatelet or anticoagulant or SGLT2 inhibitors.  The patient is on Ozempic which should be held preoperatively as per anesthesia/surgeon.  DVT prophylaxis as per surgery.  Thank you for this referral and allowing me to participate in the care of this patient.  If I can be of any further help or  if you have any questions, please do not hesitate to contact me   Sincerely,  Gregorio Stephenson MD, FACC, VENICE

## 2024-04-29 NOTE — HISTORY OF PRESENT ILLNESS
[FreeTextEntry1] : Julianna is a 54-year-old female with hypertension, obesity, diabetes, GERD.    After an abnormal stress test: Invasive coronary angiography in 2022 revealed normal coronary arteries.  No symptoms of CAD  Hypertension: Well-controlled.    Obesity, sleep apnea, on CPAP mask. No chest pain, testing dyspnea, PND, orthopnea or, pedal edema.  No history of cardiovascular events in the past year.  The patient is able to go up 2 flight of steps without cardiac symptoms.  Patient with history of GERD.  She had hernia repair and fundoplication.  She is scheduled to have hiatus hernia repair  Echocardiogram October 2023 revealed EF of 55%.  Mid anteroseptal segment and mid inferoseptal segments are mildly hypokinetic?  Mild LVH.  Mild MR and trace AR.  No pericardial effusion.  Normal PASP.  Study was technically difficult.  EKG 4/29/2024: Sinus rhythm.  Left axis deviation.  LVH.  No ST-T abnormality.  ** (Patient with history of gastroparesis and history of fundoplication.  Patient on Ozempic without side effects.  She has mild nausea on the first day after injection.  I have asked the patient to review use of Ozempic in her case with Dr. Guillaume or GI.)

## 2024-04-29 NOTE — PHYSICAL EXAM
Xray tech made RN aware that pt is refusing CXR at this time, ER PA to be informend   [Soft] : abdomen soft [Normal] : no edema, no cyanosis, no clubbing, no varicosities [No Edema] : no edema [de-identified] : Obesity

## 2024-05-09 ENCOUNTER — TRANSCRIPTION ENCOUNTER (OUTPATIENT)
Age: 54
End: 2024-05-09

## 2024-05-09 NOTE — PATIENT PROFILE ADULT - FALL HARM RISK - HARM RISK INTERVENTIONS

## 2024-05-10 ENCOUNTER — APPOINTMENT (OUTPATIENT)
Dept: THORACIC SURGERY | Facility: HOSPITAL | Age: 54
End: 2024-05-10

## 2024-05-10 ENCOUNTER — INPATIENT (INPATIENT)
Facility: HOSPITAL | Age: 54
LOS: 1 days | Discharge: HOME | DRG: 392 | End: 2024-05-12
Attending: THORACIC SURGERY (CARDIOTHORACIC VASCULAR SURGERY) | Admitting: THORACIC SURGERY (CARDIOTHORACIC VASCULAR SURGERY)
Payer: MEDICARE

## 2024-05-10 ENCOUNTER — RESULT REVIEW (OUTPATIENT)
Age: 54
End: 2024-05-10

## 2024-05-10 VITALS
TEMPERATURE: 98 F | HEIGHT: 63 IN | RESPIRATION RATE: 16 BRPM | SYSTOLIC BLOOD PRESSURE: 145 MMHG | HEART RATE: 81 BPM | OXYGEN SATURATION: 98 % | WEIGHT: 218.48 LBS | DIASTOLIC BLOOD PRESSURE: 81 MMHG

## 2024-05-10 DIAGNOSIS — Z90.49 ACQUIRED ABSENCE OF OTHER SPECIFIED PARTS OF DIGESTIVE TRACT: Chronic | ICD-10-CM

## 2024-05-10 DIAGNOSIS — K44.9 DIAPHRAGMATIC HERNIA WITHOUT OBSTRUCTION OR GANGRENE: ICD-10-CM

## 2024-05-10 DIAGNOSIS — Z96.82 PRESENCE OF NEUROSTIMULATOR: Chronic | ICD-10-CM

## 2024-05-10 DIAGNOSIS — Z98.890 OTHER SPECIFIED POSTPROCEDURAL STATES: Chronic | ICD-10-CM

## 2024-05-10 LAB
ANION GAP SERPL CALC-SCNC: 15 MMOL/L — SIGNIFICANT CHANGE UP (ref 5–17)
APTT BLD: 29.7 SEC — SIGNIFICANT CHANGE UP (ref 24.5–35.6)
BASOPHILS # BLD AUTO: 0.05 K/UL — SIGNIFICANT CHANGE UP (ref 0–0.2)
BASOPHILS NFR BLD AUTO: 0.7 % — SIGNIFICANT CHANGE UP (ref 0–2)
BUN SERPL-MCNC: 17.9 MG/DL — SIGNIFICANT CHANGE UP (ref 8–20)
CALCIUM SERPL-MCNC: 8.5 MG/DL — SIGNIFICANT CHANGE UP (ref 8.4–10.5)
CHLORIDE SERPL-SCNC: 102 MMOL/L — SIGNIFICANT CHANGE UP (ref 96–108)
CO2 SERPL-SCNC: 20 MMOL/L — LOW (ref 22–29)
CREAT SERPL-MCNC: 1.18 MG/DL — SIGNIFICANT CHANGE UP (ref 0.5–1.3)
EGFR: 54 ML/MIN/1.73M2 — LOW
EOSINOPHIL # BLD AUTO: 0.07 K/UL — SIGNIFICANT CHANGE UP (ref 0–0.5)
EOSINOPHIL NFR BLD AUTO: 0.9 % — SIGNIFICANT CHANGE UP (ref 0–6)
GLUCOSE BLDC GLUCOMTR-MCNC: 108 MG/DL — HIGH (ref 70–99)
GLUCOSE BLDC GLUCOMTR-MCNC: 166 MG/DL — HIGH (ref 70–99)
GLUCOSE BLDC GLUCOMTR-MCNC: 169 MG/DL — HIGH (ref 70–99)
GLUCOSE BLDC GLUCOMTR-MCNC: 171 MG/DL — HIGH (ref 70–99)
GLUCOSE BLDC GLUCOMTR-MCNC: 216 MG/DL — HIGH (ref 70–99)
GLUCOSE SERPL-MCNC: 181 MG/DL — HIGH (ref 70–99)
HCT VFR BLD CALC: 38.7 % — SIGNIFICANT CHANGE UP (ref 34.5–45)
HGB BLD-MCNC: 13.2 G/DL — SIGNIFICANT CHANGE UP (ref 11.5–15.5)
IMM GRANULOCYTES NFR BLD AUTO: 0.3 % — SIGNIFICANT CHANGE UP (ref 0–0.9)
INR BLD: 1.04 RATIO — SIGNIFICANT CHANGE UP (ref 0.85–1.18)
LACTATE SERPL-SCNC: 2.1 MMOL/L — HIGH (ref 0.5–2)
LACTATE SERPL-SCNC: 2.9 MMOL/L — HIGH (ref 0.5–2)
LYMPHOCYTES # BLD AUTO: 1.08 K/UL — SIGNIFICANT CHANGE UP (ref 1–3.3)
LYMPHOCYTES # BLD AUTO: 14.7 % — SIGNIFICANT CHANGE UP (ref 13–44)
MCHC RBC-ENTMCNC: 29.3 PG — SIGNIFICANT CHANGE UP (ref 27–34)
MCHC RBC-ENTMCNC: 34.1 GM/DL — SIGNIFICANT CHANGE UP (ref 32–36)
MCV RBC AUTO: 85.8 FL — SIGNIFICANT CHANGE UP (ref 80–100)
MONOCYTES # BLD AUTO: 0.21 K/UL — SIGNIFICANT CHANGE UP (ref 0–0.9)
MONOCYTES NFR BLD AUTO: 2.8 % — SIGNIFICANT CHANGE UP (ref 2–14)
NEUTROPHILS # BLD AUTO: 5.94 K/UL — SIGNIFICANT CHANGE UP (ref 1.8–7.4)
NEUTROPHILS NFR BLD AUTO: 80.6 % — HIGH (ref 43–77)
PLATELET # BLD AUTO: 180 K/UL — SIGNIFICANT CHANGE UP (ref 150–400)
POTASSIUM SERPL-MCNC: 3.9 MMOL/L — SIGNIFICANT CHANGE UP (ref 3.5–5.3)
POTASSIUM SERPL-SCNC: 3.9 MMOL/L — SIGNIFICANT CHANGE UP (ref 3.5–5.3)
PROTHROM AB SERPL-ACNC: 11.5 SEC — SIGNIFICANT CHANGE UP (ref 9.5–13)
RBC # BLD: 4.51 M/UL — SIGNIFICANT CHANGE UP (ref 3.8–5.2)
RBC # FLD: 12.7 % — SIGNIFICANT CHANGE UP (ref 10.3–14.5)
SODIUM SERPL-SCNC: 137 MMOL/L — SIGNIFICANT CHANGE UP (ref 135–145)
WBC # BLD: 7.37 K/UL — SIGNIFICANT CHANGE UP (ref 3.8–10.5)
WBC # FLD AUTO: 7.37 K/UL — SIGNIFICANT CHANGE UP (ref 3.8–10.5)

## 2024-05-10 PROCEDURE — 43281 LAP PARAESOPHAG HERN REPAIR: CPT | Mod: AS

## 2024-05-10 PROCEDURE — 71045 X-RAY EXAM CHEST 1 VIEW: CPT | Mod: 26,76

## 2024-05-10 PROCEDURE — 88302 TISSUE EXAM BY PATHOLOGIST: CPT | Mod: 26

## 2024-05-10 PROCEDURE — S2900 ROBOTIC SURGICAL SYSTEM: CPT | Mod: NC

## 2024-05-10 PROCEDURE — 88305 TISSUE EXAM BY PATHOLOGIST: CPT | Mod: 26

## 2024-05-10 PROCEDURE — 43200 ESOPHAGOSCOPY FLEXIBLE BRUSH: CPT

## 2024-05-10 PROCEDURE — 43281 LAP PARAESOPHAG HERN REPAIR: CPT

## 2024-05-10 DEVICE — STAPLER COVIDIEN TRI-STAPLE 60MM PURPLE RELOAD: Type: IMPLANTABLE DEVICE | Status: FUNCTIONAL

## 2024-05-10 DEVICE — FELT PTFE 6 X 6": Type: IMPLANTABLE DEVICE | Status: FUNCTIONAL

## 2024-05-10 DEVICE — LIGATING CLIPS WECK HEMOLOK POLYMER LARGE (PURPLE) 6: Type: IMPLANTABLE DEVICE | Status: FUNCTIONAL

## 2024-05-10 DEVICE — SURGICEL FIBRILLAR 4 X 4": Type: IMPLANTABLE DEVICE | Status: FUNCTIONAL

## 2024-05-10 RX ORDER — DEXTROSE 50 % IN WATER 50 %
15 SYRINGE (ML) INTRAVENOUS ONCE
Refills: 0 | Status: DISCONTINUED | OUTPATIENT
Start: 2024-05-10 | End: 2024-05-12

## 2024-05-10 RX ORDER — ONDANSETRON 8 MG/1
4 TABLET, FILM COATED ORAL ONCE
Refills: 0 | Status: DISCONTINUED | OUTPATIENT
Start: 2024-05-10 | End: 2024-05-10

## 2024-05-10 RX ORDER — BUPROPION HYDROCHLORIDE 150 MG/1
1 TABLET, EXTENDED RELEASE ORAL
Refills: 0 | DISCHARGE

## 2024-05-10 RX ORDER — FENTANYL CITRATE 50 UG/ML
25 INJECTION INTRAVENOUS
Refills: 0 | Status: DISCONTINUED | OUTPATIENT
Start: 2024-05-10 | End: 2024-05-10

## 2024-05-10 RX ORDER — SODIUM CHLORIDE 9 MG/ML
1000 INJECTION, SOLUTION INTRAVENOUS
Refills: 0 | Status: DISCONTINUED | OUTPATIENT
Start: 2024-05-10 | End: 2024-05-12

## 2024-05-10 RX ORDER — POTASSIUM CHLORIDE 20 MEQ
10 PACKET (EA) ORAL
Refills: 0 | Status: COMPLETED | OUTPATIENT
Start: 2024-05-10 | End: 2024-05-10

## 2024-05-10 RX ORDER — IRBESARTAN 75 MG/1
1 TABLET ORAL
Refills: 0 | DISCHARGE

## 2024-05-10 RX ORDER — FLUVOXAMINE MALEATE 25 MG/1
2 TABLET ORAL
Refills: 0 | DISCHARGE

## 2024-05-10 RX ORDER — ATORVASTATIN CALCIUM 80 MG/1
1 TABLET, FILM COATED ORAL
Refills: 0 | DISCHARGE

## 2024-05-10 RX ORDER — INSULIN LISPRO 100/ML
VIAL (ML) SUBCUTANEOUS EVERY 6 HOURS
Refills: 0 | Status: DISCONTINUED | OUTPATIENT
Start: 2024-05-10 | End: 2024-05-10

## 2024-05-10 RX ORDER — SEMAGLUTIDE 0.68 MG/ML
0.25 INJECTION, SOLUTION SUBCUTANEOUS
Refills: 0 | DISCHARGE

## 2024-05-10 RX ORDER — HEPARIN SODIUM 5000 [USP'U]/ML
5000 INJECTION INTRAVENOUS; SUBCUTANEOUS EVERY 8 HOURS
Refills: 0 | Status: DISCONTINUED | OUTPATIENT
Start: 2024-05-11 | End: 2024-05-12

## 2024-05-10 RX ORDER — VANCOMYCIN HCL 1 G
1500 VIAL (EA) INTRAVENOUS ONCE
Refills: 0 | Status: COMPLETED | OUTPATIENT
Start: 2024-05-10 | End: 2024-05-10

## 2024-05-10 RX ORDER — HYDROMORPHONE HYDROCHLORIDE 2 MG/ML
0.5 INJECTION INTRAMUSCULAR; INTRAVENOUS; SUBCUTANEOUS
Refills: 0 | Status: DISCONTINUED | OUTPATIENT
Start: 2024-05-10 | End: 2024-05-10

## 2024-05-10 RX ORDER — METOCLOPRAMIDE HCL 10 MG
10 TABLET ORAL EVERY 8 HOURS
Refills: 0 | Status: DISCONTINUED | OUTPATIENT
Start: 2024-05-10 | End: 2024-05-12

## 2024-05-10 RX ORDER — METFORMIN HYDROCHLORIDE 850 MG/1
1 TABLET ORAL
Refills: 0 | DISCHARGE

## 2024-05-10 RX ORDER — ACETAMINOPHEN 500 MG
1000 TABLET ORAL ONCE
Refills: 0 | Status: COMPLETED | OUTPATIENT
Start: 2024-05-10 | End: 2024-05-10

## 2024-05-10 RX ORDER — GLUCAGON INJECTION, SOLUTION 0.5 MG/.1ML
1 INJECTION, SOLUTION SUBCUTANEOUS ONCE
Refills: 0 | Status: DISCONTINUED | OUTPATIENT
Start: 2024-05-10 | End: 2024-05-12

## 2024-05-10 RX ORDER — FLUVOXAMINE MALEATE 25 MG/1
1 TABLET ORAL
Refills: 0 | DISCHARGE

## 2024-05-10 RX ORDER — OMEPRAZOLE 10 MG/1
1 CAPSULE, DELAYED RELEASE ORAL
Refills: 0 | DISCHARGE

## 2024-05-10 RX ORDER — PANTOPRAZOLE SODIUM 20 MG/1
40 TABLET, DELAYED RELEASE ORAL DAILY
Refills: 0 | Status: DISCONTINUED | OUTPATIENT
Start: 2024-05-10 | End: 2024-05-12

## 2024-05-10 RX ORDER — LEVOTHYROXINE SODIUM 125 MCG
100 TABLET ORAL AT BEDTIME
Refills: 0 | Status: DISCONTINUED | OUTPATIENT
Start: 2024-05-10 | End: 2024-05-11

## 2024-05-10 RX ORDER — AMLODIPINE BESYLATE 2.5 MG/1
1 TABLET ORAL
Refills: 0 | DISCHARGE

## 2024-05-10 RX ORDER — GABAPENTIN 400 MG/1
2 CAPSULE ORAL
Refills: 0 | DISCHARGE

## 2024-05-10 RX ORDER — SODIUM CHLORIDE 9 MG/ML
500 INJECTION, SOLUTION INTRAVENOUS ONCE
Refills: 0 | Status: COMPLETED | OUTPATIENT
Start: 2024-05-10 | End: 2024-05-10

## 2024-05-10 RX ORDER — DEXTROSE 50 % IN WATER 50 %
12.5 SYRINGE (ML) INTRAVENOUS ONCE
Refills: 0 | Status: DISCONTINUED | OUTPATIENT
Start: 2024-05-10 | End: 2024-05-12

## 2024-05-10 RX ORDER — BUPIVACAINE 13.3 MG/ML
20 INJECTION, SUSPENSION, LIPOSOMAL INFILTRATION ONCE
Refills: 0 | Status: DISCONTINUED | OUTPATIENT
Start: 2024-05-10 | End: 2024-05-10

## 2024-05-10 RX ORDER — SODIUM CHLORIDE 9 MG/ML
1000 INJECTION, SOLUTION INTRAVENOUS
Refills: 0 | Status: DISCONTINUED | OUTPATIENT
Start: 2024-05-10 | End: 2024-05-11

## 2024-05-10 RX ORDER — DEXTROSE 50 % IN WATER 50 %
25 SYRINGE (ML) INTRAVENOUS ONCE
Refills: 0 | Status: DISCONTINUED | OUTPATIENT
Start: 2024-05-10 | End: 2024-05-12

## 2024-05-10 RX ORDER — DEXTROSE 10 % IN WATER 10 %
125 INTRAVENOUS SOLUTION INTRAVENOUS ONCE
Refills: 0 | Status: DISCONTINUED | OUTPATIENT
Start: 2024-05-10 | End: 2024-05-12

## 2024-05-10 RX ORDER — SODIUM CHLORIDE 9 MG/ML
3 INJECTION INTRAMUSCULAR; INTRAVENOUS; SUBCUTANEOUS EVERY 8 HOURS
Refills: 0 | Status: DISCONTINUED | OUTPATIENT
Start: 2024-05-10 | End: 2024-05-10

## 2024-05-10 RX ORDER — KETOROLAC TROMETHAMINE 30 MG/ML
30 SYRINGE (ML) INJECTION ONCE
Refills: 0 | Status: DISCONTINUED | OUTPATIENT
Start: 2024-05-10 | End: 2024-05-10

## 2024-05-10 RX ORDER — ONDANSETRON 8 MG/1
4 TABLET, FILM COATED ORAL EVERY 6 HOURS
Refills: 0 | Status: DISCONTINUED | OUTPATIENT
Start: 2024-05-10 | End: 2024-05-12

## 2024-05-10 RX ORDER — INSULIN LISPRO 100/ML
VIAL (ML) SUBCUTANEOUS EVERY 6 HOURS
Refills: 0 | Status: DISCONTINUED | OUTPATIENT
Start: 2024-05-10 | End: 2024-05-12

## 2024-05-10 RX ADMIN — Medication 1000 MILLIGRAM(S): at 21:50

## 2024-05-10 RX ADMIN — FENTANYL CITRATE 25 MICROGRAM(S): 50 INJECTION INTRAVENOUS at 21:00

## 2024-05-10 RX ADMIN — SODIUM CHLORIDE 2000 MILLILITER(S): 9 INJECTION, SOLUTION INTRAVENOUS at 17:30

## 2024-05-10 RX ADMIN — ONDANSETRON 4 MILLIGRAM(S): 8 TABLET, FILM COATED ORAL at 23:54

## 2024-05-10 RX ADMIN — Medication 100 MILLIEQUIVALENT(S): at 20:06

## 2024-05-10 RX ADMIN — Medication 100 MILLIEQUIVALENT(S): at 18:15

## 2024-05-10 RX ADMIN — Medication 1: at 23:54

## 2024-05-10 RX ADMIN — FENTANYL CITRATE 25 MICROGRAM(S): 50 INJECTION INTRAVENOUS at 21:30

## 2024-05-10 RX ADMIN — Medication 300 MILLIGRAM(S): at 10:19

## 2024-05-10 RX ADMIN — Medication 400 MILLIGRAM(S): at 21:30

## 2024-05-10 RX ADMIN — ONDANSETRON 4 MILLIGRAM(S): 8 TABLET, FILM COATED ORAL at 18:33

## 2024-05-10 RX ADMIN — Medication 10 MILLIGRAM(S): at 22:13

## 2024-05-10 RX ADMIN — Medication 100 MILLIEQUIVALENT(S): at 22:14

## 2024-05-10 RX ADMIN — FENTANYL CITRATE 25 MICROGRAM(S): 50 INJECTION INTRAVENOUS at 20:47

## 2024-05-10 RX ADMIN — Medication 1: at 18:33

## 2024-05-10 RX ADMIN — FENTANYL CITRATE 25 MICROGRAM(S): 50 INJECTION INTRAVENOUS at 21:18

## 2024-05-10 NOTE — BRIEF OPERATIVE NOTE - NSICDXBRIEFPROCEDURE_GEN_ALL_CORE_FT
PROCEDURES:  Repair, hiatal hernia, robot-assisted 10-May-2024 16:24:25 EGD, Re-op Robotic hiatal hernia repair with resection of fundus, lysis of adhesions, Toupet fundoplication Arlyn Núñez

## 2024-05-10 NOTE — BRIEF OPERATIVE NOTE - COMMENTS
SMall right pneumothorax seen on post op xray.  Patient is stable will repeat xray in 1 hour post op.

## 2024-05-11 ENCOUNTER — TRANSCRIPTION ENCOUNTER (OUTPATIENT)
Age: 54
End: 2024-05-11

## 2024-05-11 LAB
ANION GAP SERPL CALC-SCNC: 15 MMOL/L — SIGNIFICANT CHANGE UP (ref 5–17)
BASOPHILS # BLD AUTO: 0.01 K/UL — SIGNIFICANT CHANGE UP (ref 0–0.2)
BASOPHILS NFR BLD AUTO: 0.1 % — SIGNIFICANT CHANGE UP (ref 0–2)
BUN SERPL-MCNC: 14.3 MG/DL — SIGNIFICANT CHANGE UP (ref 8–20)
CALCIUM SERPL-MCNC: 8.6 MG/DL — SIGNIFICANT CHANGE UP (ref 8.4–10.5)
CHLORIDE SERPL-SCNC: 105 MMOL/L — SIGNIFICANT CHANGE UP (ref 96–108)
CO2 SERPL-SCNC: 19 MMOL/L — LOW (ref 22–29)
CREAT SERPL-MCNC: 0.93 MG/DL — SIGNIFICANT CHANGE UP (ref 0.5–1.3)
EGFR: 72 ML/MIN/1.73M2 — SIGNIFICANT CHANGE UP
EOSINOPHIL # BLD AUTO: 0 K/UL — SIGNIFICANT CHANGE UP (ref 0–0.5)
EOSINOPHIL NFR BLD AUTO: 0 % — SIGNIFICANT CHANGE UP (ref 0–6)
GLUCOSE BLDC GLUCOMTR-MCNC: 118 MG/DL — HIGH (ref 70–99)
GLUCOSE BLDC GLUCOMTR-MCNC: 120 MG/DL — HIGH (ref 70–99)
GLUCOSE BLDC GLUCOMTR-MCNC: 124 MG/DL — HIGH (ref 70–99)
GLUCOSE SERPL-MCNC: 143 MG/DL — HIGH (ref 70–99)
HCT VFR BLD CALC: 35.1 % — SIGNIFICANT CHANGE UP (ref 34.5–45)
HGB BLD-MCNC: 11.8 G/DL — SIGNIFICANT CHANGE UP (ref 11.5–15.5)
IMM GRANULOCYTES NFR BLD AUTO: 0.3 % — SIGNIFICANT CHANGE UP (ref 0–0.9)
LACTATE SERPL-SCNC: 1.3 MMOL/L — SIGNIFICANT CHANGE UP (ref 0.5–2)
LYMPHOCYTES # BLD AUTO: 0.62 K/UL — LOW (ref 1–3.3)
LYMPHOCYTES # BLD AUTO: 6.6 % — LOW (ref 13–44)
MAGNESIUM SERPL-MCNC: 1.6 MG/DL — SIGNIFICANT CHANGE UP (ref 1.6–2.6)
MCHC RBC-ENTMCNC: 29.1 PG — SIGNIFICANT CHANGE UP (ref 27–34)
MCHC RBC-ENTMCNC: 33.6 GM/DL — SIGNIFICANT CHANGE UP (ref 32–36)
MCV RBC AUTO: 86.7 FL — SIGNIFICANT CHANGE UP (ref 80–100)
MONOCYTES # BLD AUTO: 0.53 K/UL — SIGNIFICANT CHANGE UP (ref 0–0.9)
MONOCYTES NFR BLD AUTO: 5.7 % — SIGNIFICANT CHANGE UP (ref 2–14)
NEUTROPHILS # BLD AUTO: 8.16 K/UL — HIGH (ref 1.8–7.4)
NEUTROPHILS NFR BLD AUTO: 87.3 % — HIGH (ref 43–77)
PLATELET # BLD AUTO: 178 K/UL — SIGNIFICANT CHANGE UP (ref 150–400)
POTASSIUM SERPL-MCNC: 4.6 MMOL/L — SIGNIFICANT CHANGE UP (ref 3.5–5.3)
POTASSIUM SERPL-SCNC: 4.6 MMOL/L — SIGNIFICANT CHANGE UP (ref 3.5–5.3)
RBC # BLD: 4.05 M/UL — SIGNIFICANT CHANGE UP (ref 3.8–5.2)
RBC # FLD: 12.7 % — SIGNIFICANT CHANGE UP (ref 10.3–14.5)
SODIUM SERPL-SCNC: 139 MMOL/L — SIGNIFICANT CHANGE UP (ref 135–145)
WBC # BLD: 9.35 K/UL — SIGNIFICANT CHANGE UP (ref 3.8–10.5)
WBC # FLD AUTO: 9.35 K/UL — SIGNIFICANT CHANGE UP (ref 3.8–10.5)

## 2024-05-11 PROCEDURE — 99024 POSTOP FOLLOW-UP VISIT: CPT

## 2024-05-11 PROCEDURE — 99291 CRITICAL CARE FIRST HOUR: CPT | Mod: 24

## 2024-05-11 PROCEDURE — 71250 CT THORAX DX C-: CPT | Mod: 26

## 2024-05-11 RX ORDER — GABAPENTIN 400 MG/1
300 CAPSULE ORAL
Refills: 0 | Status: DISCONTINUED | OUTPATIENT
Start: 2024-05-11 | End: 2024-05-12

## 2024-05-11 RX ORDER — ARIPIPRAZOLE 15 MG/1
2 TABLET ORAL AT BEDTIME
Refills: 0 | Status: DISCONTINUED | OUTPATIENT
Start: 2024-05-11 | End: 2024-05-12

## 2024-05-11 RX ORDER — KETOROLAC TROMETHAMINE 30 MG/ML
15 SYRINGE (ML) INJECTION ONCE
Refills: 0 | Status: DISCONTINUED | OUTPATIENT
Start: 2024-05-11 | End: 2024-05-11

## 2024-05-11 RX ORDER — FLUVOXAMINE MALEATE 25 MG/1
200 TABLET ORAL DAILY
Refills: 0 | Status: DISCONTINUED | OUTPATIENT
Start: 2024-05-12 | End: 2024-05-12

## 2024-05-11 RX ORDER — ATORVASTATIN CALCIUM 80 MG/1
40 TABLET, FILM COATED ORAL AT BEDTIME
Refills: 0 | Status: DISCONTINUED | OUTPATIENT
Start: 2024-05-11 | End: 2024-05-12

## 2024-05-11 RX ORDER — FLUVOXAMINE MALEATE 25 MG/1
100 TABLET ORAL AT BEDTIME
Refills: 0 | Status: DISCONTINUED | OUTPATIENT
Start: 2024-05-12 | End: 2024-05-12

## 2024-05-11 RX ORDER — AMLODIPINE BESYLATE 2.5 MG/1
10 TABLET ORAL DAILY
Refills: 0 | Status: DISCONTINUED | OUTPATIENT
Start: 2024-05-11 | End: 2024-05-12

## 2024-05-11 RX ORDER — LEVOTHYROXINE SODIUM 125 MCG
150 TABLET ORAL DAILY
Refills: 0 | Status: DISCONTINUED | OUTPATIENT
Start: 2024-05-11 | End: 2024-05-12

## 2024-05-11 RX ORDER — MAGNESIUM SULFATE 500 MG/ML
2 VIAL (ML) INJECTION ONCE
Refills: 0 | Status: COMPLETED | OUTPATIENT
Start: 2024-05-11 | End: 2024-05-11

## 2024-05-11 RX ORDER — HYDROMORPHONE HYDROCHLORIDE 2 MG/ML
0.5 INJECTION INTRAMUSCULAR; INTRAVENOUS; SUBCUTANEOUS ONCE
Refills: 0 | Status: DISCONTINUED | OUTPATIENT
Start: 2024-05-11 | End: 2024-05-11

## 2024-05-11 RX ORDER — METOPROLOL TARTRATE 50 MG
5 TABLET ORAL ONCE
Refills: 0 | Status: COMPLETED | OUTPATIENT
Start: 2024-05-11 | End: 2024-05-11

## 2024-05-11 RX ORDER — ACETAMINOPHEN 500 MG
650 TABLET ORAL EVERY 6 HOURS
Refills: 0 | Status: DISCONTINUED | OUTPATIENT
Start: 2024-05-11 | End: 2024-05-12

## 2024-05-11 RX ORDER — LOSARTAN POTASSIUM 100 MG/1
50 TABLET, FILM COATED ORAL DAILY
Refills: 0 | Status: DISCONTINUED | OUTPATIENT
Start: 2024-05-12 | End: 2024-05-12

## 2024-05-11 RX ORDER — ACETAMINOPHEN 500 MG
1000 TABLET ORAL ONCE
Refills: 0 | Status: COMPLETED | OUTPATIENT
Start: 2024-05-11 | End: 2024-05-11

## 2024-05-11 RX ADMIN — ARIPIPRAZOLE 2 MILLIGRAM(S): 15 TABLET ORAL at 22:19

## 2024-05-11 RX ADMIN — SODIUM CHLORIDE 75 MILLILITER(S): 9 INJECTION, SOLUTION INTRAVENOUS at 05:58

## 2024-05-11 RX ADMIN — Medication 25 GRAM(S): at 03:35

## 2024-05-11 RX ADMIN — Medication 10 MILLIGRAM(S): at 05:57

## 2024-05-11 RX ADMIN — ONDANSETRON 4 MILLIGRAM(S): 8 TABLET, FILM COATED ORAL at 05:57

## 2024-05-11 RX ADMIN — HYDROMORPHONE HYDROCHLORIDE 0.5 MILLIGRAM(S): 2 INJECTION INTRAMUSCULAR; INTRAVENOUS; SUBCUTANEOUS at 02:44

## 2024-05-11 RX ADMIN — Medication 10 MILLIGRAM(S): at 22:21

## 2024-05-11 RX ADMIN — Medication 650 MILLIGRAM(S): at 13:33

## 2024-05-11 RX ADMIN — HEPARIN SODIUM 5000 UNIT(S): 5000 INJECTION INTRAVENOUS; SUBCUTANEOUS at 22:20

## 2024-05-11 RX ADMIN — ONDANSETRON 4 MILLIGRAM(S): 8 TABLET, FILM COATED ORAL at 11:32

## 2024-05-11 RX ADMIN — Medication 15 MILLIGRAM(S): at 06:00

## 2024-05-11 RX ADMIN — Medication 650 MILLIGRAM(S): at 22:25

## 2024-05-11 RX ADMIN — HEPARIN SODIUM 5000 UNIT(S): 5000 INJECTION INTRAVENOUS; SUBCUTANEOUS at 05:57

## 2024-05-11 RX ADMIN — Medication 15 MILLIGRAM(S): at 05:57

## 2024-05-11 RX ADMIN — Medication 5 MILLIGRAM(S): at 01:15

## 2024-05-11 RX ADMIN — Medication 10 MILLIGRAM(S): at 13:26

## 2024-05-11 RX ADMIN — HEPARIN SODIUM 5000 UNIT(S): 5000 INJECTION INTRAVENOUS; SUBCUTANEOUS at 13:26

## 2024-05-11 RX ADMIN — Medication 650 MILLIGRAM(S): at 14:30

## 2024-05-11 RX ADMIN — HYDROMORPHONE HYDROCHLORIDE 0.5 MILLIGRAM(S): 2 INJECTION INTRAMUSCULAR; INTRAVENOUS; SUBCUTANEOUS at 01:42

## 2024-05-11 RX ADMIN — PANTOPRAZOLE SODIUM 40 MILLIGRAM(S): 20 TABLET, DELAYED RELEASE ORAL at 11:32

## 2024-05-11 RX ADMIN — ONDANSETRON 4 MILLIGRAM(S): 8 TABLET, FILM COATED ORAL at 17:08

## 2024-05-11 RX ADMIN — Medication 650 MILLIGRAM(S): at 23:25

## 2024-05-11 RX ADMIN — Medication 400 MILLIGRAM(S): at 03:35

## 2024-05-11 RX ADMIN — Medication 1000 MILLIGRAM(S): at 04:00

## 2024-05-11 RX ADMIN — ATORVASTATIN CALCIUM 40 MILLIGRAM(S): 80 TABLET, FILM COATED ORAL at 22:19

## 2024-05-12 ENCOUNTER — TRANSCRIPTION ENCOUNTER (OUTPATIENT)
Age: 54
End: 2024-05-12

## 2024-05-12 VITALS
RESPIRATION RATE: 18 BRPM | SYSTOLIC BLOOD PRESSURE: 172 MMHG | HEART RATE: 89 BPM | DIASTOLIC BLOOD PRESSURE: 94 MMHG | OXYGEN SATURATION: 94 %

## 2024-05-12 LAB
ANION GAP SERPL CALC-SCNC: 13 MMOL/L — SIGNIFICANT CHANGE UP (ref 5–17)
BUN SERPL-MCNC: 13 MG/DL — SIGNIFICANT CHANGE UP (ref 8–20)
CALCIUM SERPL-MCNC: 8.6 MG/DL — SIGNIFICANT CHANGE UP (ref 8.4–10.5)
CHLORIDE SERPL-SCNC: 105 MMOL/L — SIGNIFICANT CHANGE UP (ref 96–108)
CO2 SERPL-SCNC: 23 MMOL/L — SIGNIFICANT CHANGE UP (ref 22–29)
CREAT SERPL-MCNC: 1.03 MG/DL — SIGNIFICANT CHANGE UP (ref 0.5–1.3)
EGFR: 64 ML/MIN/1.73M2 — SIGNIFICANT CHANGE UP
GLUCOSE BLDC GLUCOMTR-MCNC: 107 MG/DL — HIGH (ref 70–99)
GLUCOSE BLDC GLUCOMTR-MCNC: 124 MG/DL — HIGH (ref 70–99)
GLUCOSE BLDC GLUCOMTR-MCNC: 126 MG/DL — HIGH (ref 70–99)
GLUCOSE SERPL-MCNC: 112 MG/DL — HIGH (ref 70–99)
HCT VFR BLD CALC: 35.6 % — SIGNIFICANT CHANGE UP (ref 34.5–45)
HGB BLD-MCNC: 11.9 G/DL — SIGNIFICANT CHANGE UP (ref 11.5–15.5)
MAGNESIUM SERPL-MCNC: 1.8 MG/DL — SIGNIFICANT CHANGE UP (ref 1.6–2.6)
MCHC RBC-ENTMCNC: 29.2 PG — SIGNIFICANT CHANGE UP (ref 27–34)
MCHC RBC-ENTMCNC: 33.4 GM/DL — SIGNIFICANT CHANGE UP (ref 32–36)
MCV RBC AUTO: 87.5 FL — SIGNIFICANT CHANGE UP (ref 80–100)
PLATELET # BLD AUTO: 144 K/UL — LOW (ref 150–400)
POTASSIUM SERPL-MCNC: 4.2 MMOL/L — SIGNIFICANT CHANGE UP (ref 3.5–5.3)
POTASSIUM SERPL-SCNC: 4.2 MMOL/L — SIGNIFICANT CHANGE UP (ref 3.5–5.3)
RBC # BLD: 4.07 M/UL — SIGNIFICANT CHANGE UP (ref 3.8–5.2)
RBC # FLD: 13 % — SIGNIFICANT CHANGE UP (ref 10.3–14.5)
SODIUM SERPL-SCNC: 141 MMOL/L — SIGNIFICANT CHANGE UP (ref 135–145)
WBC # BLD: 7.44 K/UL — SIGNIFICANT CHANGE UP (ref 3.8–10.5)
WBC # FLD AUTO: 7.44 K/UL — SIGNIFICANT CHANGE UP (ref 3.8–10.5)

## 2024-05-12 PROCEDURE — 71250 CT THORAX DX C-: CPT | Mod: MC

## 2024-05-12 PROCEDURE — 93010 ELECTROCARDIOGRAM REPORT: CPT

## 2024-05-12 PROCEDURE — 80048 BASIC METABOLIC PNL TOTAL CA: CPT

## 2024-05-12 PROCEDURE — 83605 ASSAY OF LACTIC ACID: CPT

## 2024-05-12 PROCEDURE — S2900: CPT

## 2024-05-12 PROCEDURE — C9399: CPT

## 2024-05-12 PROCEDURE — 83735 ASSAY OF MAGNESIUM: CPT

## 2024-05-12 PROCEDURE — C1889: CPT

## 2024-05-12 PROCEDURE — 85730 THROMBOPLASTIN TIME PARTIAL: CPT

## 2024-05-12 PROCEDURE — 82962 GLUCOSE BLOOD TEST: CPT

## 2024-05-12 PROCEDURE — 99024 POSTOP FOLLOW-UP VISIT: CPT

## 2024-05-12 PROCEDURE — 71045 X-RAY EXAM CHEST 1 VIEW: CPT | Mod: 26

## 2024-05-12 PROCEDURE — 85610 PROTHROMBIN TIME: CPT

## 2024-05-12 PROCEDURE — 85027 COMPLETE CBC AUTOMATED: CPT

## 2024-05-12 PROCEDURE — 85025 COMPLETE CBC W/AUTO DIFF WBC: CPT

## 2024-05-12 PROCEDURE — 93005 ELECTROCARDIOGRAM TRACING: CPT

## 2024-05-12 PROCEDURE — 88305 TISSUE EXAM BY PATHOLOGIST: CPT

## 2024-05-12 PROCEDURE — 71045 X-RAY EXAM CHEST 1 VIEW: CPT

## 2024-05-12 PROCEDURE — 88302 TISSUE EXAM BY PATHOLOGIST: CPT

## 2024-05-12 PROCEDURE — 36415 COLL VENOUS BLD VENIPUNCTURE: CPT

## 2024-05-12 RX ORDER — METOPROLOL TARTRATE 50 MG
5 TABLET ORAL ONCE
Refills: 0 | Status: DISCONTINUED | OUTPATIENT
Start: 2024-05-12 | End: 2024-05-12

## 2024-05-12 RX ORDER — INSULIN LISPRO 100/ML
VIAL (ML) SUBCUTANEOUS
Refills: 0 | Status: DISCONTINUED | OUTPATIENT
Start: 2024-05-12 | End: 2024-05-12

## 2024-05-12 RX ORDER — MAGNESIUM SULFATE 500 MG/ML
2 VIAL (ML) INJECTION ONCE
Refills: 0 | Status: COMPLETED | OUTPATIENT
Start: 2024-05-12 | End: 2024-05-12

## 2024-05-12 RX ORDER — BUPROPION HYDROCHLORIDE 150 MG/1
150 TABLET, EXTENDED RELEASE ORAL DAILY
Refills: 0 | Status: DISCONTINUED | OUTPATIENT
Start: 2024-05-12 | End: 2024-05-12

## 2024-05-12 RX ORDER — ACETAMINOPHEN 500 MG
2 TABLET ORAL
Qty: 0 | Refills: 0 | DISCHARGE
Start: 2024-05-12

## 2024-05-12 RX ORDER — ACETAMINOPHEN 500 MG
2 TABLET ORAL
Qty: 0 | Refills: 0 | DISCHARGE

## 2024-05-12 RX ADMIN — HEPARIN SODIUM 5000 UNIT(S): 5000 INJECTION INTRAVENOUS; SUBCUTANEOUS at 13:54

## 2024-05-12 RX ADMIN — GABAPENTIN 300 MILLIGRAM(S): 400 CAPSULE ORAL at 05:13

## 2024-05-12 RX ADMIN — Medication 25 GRAM(S): at 04:08

## 2024-05-12 RX ADMIN — HEPARIN SODIUM 5000 UNIT(S): 5000 INJECTION INTRAVENOUS; SUBCUTANEOUS at 05:14

## 2024-05-12 RX ADMIN — PANTOPRAZOLE SODIUM 40 MILLIGRAM(S): 20 TABLET, DELAYED RELEASE ORAL at 11:32

## 2024-05-12 RX ADMIN — AMLODIPINE BESYLATE 10 MILLIGRAM(S): 2.5 TABLET ORAL at 05:13

## 2024-05-12 RX ADMIN — FLUVOXAMINE MALEATE 200 MILLIGRAM(S): 25 TABLET ORAL at 11:33

## 2024-05-12 RX ADMIN — BUPROPION HYDROCHLORIDE 150 MILLIGRAM(S): 150 TABLET, EXTENDED RELEASE ORAL at 11:34

## 2024-05-12 RX ADMIN — Medication 10 MILLIGRAM(S): at 13:54

## 2024-05-12 RX ADMIN — ONDANSETRON 4 MILLIGRAM(S): 8 TABLET, FILM COATED ORAL at 00:22

## 2024-05-12 RX ADMIN — LOSARTAN POTASSIUM 50 MILLIGRAM(S): 100 TABLET, FILM COATED ORAL at 05:23

## 2024-05-12 RX ADMIN — Medication 10 MILLIGRAM(S): at 05:17

## 2024-05-12 RX ADMIN — Medication 150 MICROGRAM(S): at 05:29

## 2024-05-12 RX ADMIN — ONDANSETRON 4 MILLIGRAM(S): 8 TABLET, FILM COATED ORAL at 11:32

## 2024-05-12 RX ADMIN — ONDANSETRON 4 MILLIGRAM(S): 8 TABLET, FILM COATED ORAL at 05:19

## 2024-05-12 NOTE — PROGRESS NOTE ADULT - PROBLEM SELECTOR PROBLEM 5
Patient is alert and oriented times three with no signs or symptoms of distress. Lap sites are dry and intact no nausea or vomiting. No complaints of pain. Need for prophylactic measure

## 2024-05-12 NOTE — DISCHARGE NOTE NURSING/CASE MANAGEMENT/SOCIAL WORK - NSDCPEFALRISK_GEN_ALL_CORE
For information on Fall & Injury Prevention, visit: [email or web address] OR  [email or web address] OR  https://www.cdc.gov/steadi/patient.html

## 2024-05-12 NOTE — DIETITIAN INITIAL EVALUATION ADULT - PERTINENT LABORATORY DATA
05-12    141  |  105  |  13.0  ----------------------------<  112<H>  4.2   |  23.0  |  1.03    Ca    8.6      12 May 2024 02:51  Mg     1.8     05-12    POCT Blood Glucose.: 126 mg/dL (05-12-24 @ 05:28)  A1C with Estimated Average Glucose Result: 6.3 % (04-22-24 @ 15:54)

## 2024-05-12 NOTE — DISCHARGE NOTE PROVIDER - NSDCMRMEDTOKEN_GEN_ALL_CORE_FT
Abilify 2 mg oral tablet: 1 orally once a day (at bedtime)  amLODIPine 10 mg oral tablet: 1 orally once a day  gabapentin 300 mg oral tablet: 2 tab(s) orally 2 times a day  irbesartan 150 mg oral tablet: 1 tab(s) orally once a day  levothyroxine 150 mcg (0.15 mg) oral capsule: 1 orally once a day  Lipitor 40 mg oral tablet: 1 orally once a day (at bedtime)  Luvox 100 mg oral tablet: 1 tablet orally once a day (at bedtime)  Luvox 100 mg oral tablet: 2 tab(s) orally once a day in the morning  metFORMIN 1000 mg oral tablet: 1 orally 2 times a day  omeprazole 40 mg oral delayed release capsule: 1 orally once a day  Ozempic 2 mg/3 mL (0.25 mg or 0.5 mg dose) subcutaneous solution: 0.25 milligram(s) subcutaneously once a week Every monday  Topamax 100 mg oral tablet: 1 orally once a day as needed for  headache  Tylenol 500 mg oral tablet: 2 orally every 8 hours as needed for  moderate pain  Wellbutrin 100 mg oral tablet: 1 orally 2 times a day   Abilify 2 mg oral tablet: 1 orally once a day (at bedtime)  acetaminophen 325 mg oral tablet: 2 tab(s) orally every 6 hours As needed Temp greater or equal to 38C (100.4F), Mild Pain (1 - 3)  amLODIPine 10 mg oral tablet: 1 orally once a day  gabapentin 300 mg oral tablet: 2 tab(s) orally 2 times a day  irbesartan 150 mg oral tablet: 1 tab(s) orally once a day  levothyroxine 150 mcg (0.15 mg) oral capsule: 1 orally once a day  Lipitor 40 mg oral tablet: 1 orally once a day (at bedtime)  Luvox 100 mg oral tablet: 1 tablet orally once a day (at bedtime)  Luvox 100 mg oral tablet: 2 tab(s) orally once a day in the morning  metFORMIN 1000 mg oral tablet: 1 orally 2 times a day  omeprazole 40 mg oral delayed release capsule: 1 orally once a day  Ozempic 2 mg/3 mL (0.25 mg or 0.5 mg dose) subcutaneous solution: 0.25 milligram(s) subcutaneously once a week Every monday  Topamax 100 mg oral tablet: 1 orally once a day as needed for  headache  Wellbutrin 100 mg oral tablet: 1 orally 2 times a day

## 2024-05-12 NOTE — DIETITIAN NUTRITION RISK NOTIFICATION - TREATMENT: THE FOLLOWING DIET HAS BEEN RECOMMENDED
Diet, Clear Liquid:   Consistent Carbohydrate {No Snacks} (CSTCHO)  DASH/TLC {Sodium & Cholesterol Restricted} (DASH) (05-11-24 @ 11:51) [Active]

## 2024-05-12 NOTE — PROGRESS NOTE ADULT - ASSESSMENT
54 year old female PMH includes HLD, HTN, Hypothyroidism, OCD, LORA, type 2 diabetes mellitus, gastroesophageal reflux disease and hiatal hernia. She is s/p robot-assisted hiatal hernia repair with Toupet fundoplication on 4/24/2023. As per chart "Postoperative endoscopy was performed on 10/4/23 due to complaints of food getting stuck, which was negative for any obvious abnormalities and biopsies showed only inactive gastritis with no significant pathology". Patient reports nausea and dry heaving, denies vomiting. Reports felling like food gets stuck when she eats. States she was seen in [facility] in Feb. 2024 due to worsening symptoms, and a CT was performed, as per patient CT demonstrated  a mild to moderate hiatal hernia. She denies abdominal pain, fever or chills. She is now s/p upper endoscopy re-do hiatal hernia repair with fundoplication on 5/10/24 with Dr. Lr.
54 year old female PMH includes HLD, HTN, Hypothyroidism, OCD, LORA, type 2 diabetes mellitus, gastroesophageal reflux disease and hiatal hernia. She is s/p robot-assisted hiatal hernia repair with Toupet fundoplication on 4/24/2023. As per chart "Postoperative endoscopy was performed on 10/4/23 due to complaints of food getting stuck, which was negative for any obvious abnormalities and biopsies showed only inactive gastritis with no significant pathology". Patient reports nausea and dry heaving, denies vomiting. Reports felling like food gets stuck when she eats. States she was seen in [facility] in Feb. 2024 due to worsening symptoms, and a CT was performed, as per patient CT demonstrated  a mild to moderate hiatal hernia. She denies abdominal pain, fever or chills. She is now s/p upper endoscopy re-do hiatal hernia repair with fundoplication on 5/10/24 with Dr. Lr.

## 2024-05-12 NOTE — DISCHARGE NOTE PROVIDER - NSDCFUADDINST_GEN_ALL_CORE_FT
Please call the Cardiothoracic Surgery office at [phone] if you are experiencing any shortness of breath, chest pain, fevers or chills, drainage from the incisions, persistent nausea, vomiting or if you have any questions about your medications. If the symptoms are severe, call 911 and go to the nearest hospital.

## 2024-05-12 NOTE — DIETITIAN INITIAL EVALUATION ADULT - NS FNS DIET ORDER
Diet, Clear Liquid:   Consistent Carbohydrate {No Snacks} (CSTCHO)  DASH/TLC {Sodium & Cholesterol Restricted} (DASH) (05-11-24 @ 11:51)

## 2024-05-12 NOTE — DIETITIAN INITIAL EVALUATION ADULT - ORAL INTAKE PTA/DIET HISTORY
Nutrition assessment completed. Pt tolerating clear liquid diet. Pt states she had surgery one year ago and since has not been able to eat much. States she would get full quickly and have a feeling food was getting stuck. Reports a 10 lb wt loss. RD to follow up as feasible.

## 2024-05-12 NOTE — DISCHARGE NOTE NURSING/CASE MANAGEMENT/SOCIAL WORK - PATIENT PORTAL LINK FT
You can access the FollowMyHealth Patient Portal offered by [facility] by registering at the following website: [email or web address]. By joining [facility]’s FollowMyHealth portal, you will also be able to view your health information using other applications (apps) compatible with our system.

## 2024-05-12 NOTE — PROGRESS NOTE ADULT - SUBJECTIVE AND OBJECTIVE BOX
NATIVIDAD MORA  MRN-[id number]    HPI:  Patient is a 54 year old  female presenting today for PST, PMH includes HLD, HTN, Hypothyroidism, OCD, LORA, type 2 diabetes mellitus, gastroesophageal reflux disease and hiatal hernia. She is s/p robot-assisted hiatal hernia repair with Toupet fundoplication on 4/24/2023. As per chart "Postoperative endoscopy was performed on 10/4/23 due to complaints of food getting stuck, which was negative for any obvious abnormalities and biopsies showed only inactive gastritis with no significant pathology". Patient reports nausea and dry heaving, denies vomiting. Reports felling like food gets stuck when she eats. States she was seen in [facility] in Feb. 2024 due to worsening symptoms, and a CT was performed, as per patient CT demonstrated  a mild to moderate hiatal hernia. She denies abdominal pain, fever or chills. She is now scheduled for upper endoscopy re-do hiatal hernia repair with fundoplication on 5/10/24 with Dr. Lr pending medical and cardiac clearance.  (22 Apr 2024 15:55)      Surgery/Hospital Course:  PROCEDURES:  Repair, hiatal hernia, robot-assisted 10-May-2024  EGD, Re-op Robotic hiatal hernia repair with resection of fundus, lysis of adhesions, Toupet fundoplication     POST-OP DIAGNOSIS:  Hiatal hernia     PRE-OP DIAGNOSIS:  Hiatal hernia    -  Today:  No acute events     ICU Vital Signs Last 24 Hrs  T(C): 36.7 (11 May 2024 11:36), Max: 36.7 (10 May 2024 20:00)  T(F): 98.1 (11 May 2024 11:36), Max: 98.1 (11 May 2024 11:36)  HR: 86 (11 May 2024 12:00) (76 - 105)  BP: 154/86 (11 May 2024 12:00) (130/73 - 176/67)  BP(mean): 106 (11 May 2024 12:00) (95 - 123)  ABP: --  ABP(mean): --  RR: 17 (11 May 2024 12:00) (13 - 21)  SpO2: 96% (11 May 2024 12:00) (90% - 97%)    O2 Parameters below as of 11 May 2024 12:00  Patient On (Oxygen Delivery Method): nasal cannula  O2 Flow (L/min): 2          Physical Exam:  Gen: A&O   CNS: non focal 	  Neck: no JVD  RES : clear , no wheezing              CVS: Regular  rhythm. Normal S1/S2  Abd: Soft, non-distended. Bowel sounds present.  Skin: No rash.  Ext:  no edema    ============================I/O===========================   I&O's Detail    10 May 2024 07:01  -  11 May 2024 07:00  --------------------------------------------------------  IN:    IV PiggyBack: 100 mL    IV PiggyBack: 50 mL    IV PiggyBack: 100 mL    Lactated Ringers: 1125 mL    Lactated Ringers Bolus: 500 mL  Total IN: 1875 mL    OUT:    Indwelling Catheter - Urethral (mL): 2470 mL  Total OUT: 2470 mL    Total NET: -595 mL      11 May 2024 07:01  -  11 May 2024 13:42  --------------------------------------------------------  IN:    Lactated Ringers: 375 mL    Oral Fluid: 120 mL  Total IN: 495 mL    OUT:    Indwelling Catheter - Urethral (mL): 265 mL  Total OUT: 265 mL    Total NET: 230 mL        ============================ LABS =========================                        11.8   9.35  )-----------( 178      ( 11 May 2024 02:15 )             35.1     05-11    139  |  105  |  14.3  ----------------------------<  143<H>  4.6   |  19.0<L>  |  0.93    Ca    8.6      11 May 2024 02:15  Mg     1.6     05-11        PT/INR - ( 10 May 2024 16:00 )   PT: 11.5 sec;   INR: 1.04 ratio         PTT - ( 10 May 2024 16:00 )  PTT:29.7 sec    Urinalysis Basic - ( 11 May 2024 02:15 )    Color: x / Appearance: x / SG: x / pH: x  Gluc: 143 mg/dL / Ketone: x  / Bili: x / Urobili: x   Blood: x / Protein: x / Nitrite: x   Leuk Esterase: x / RBC: x / WBC x   Sq Epi: x / Non Sq Epi: x / Bacteria: x      ======================Micro/Rad/Cardio=================  Culture: Reviewed   CXR: Reviewed  Echo:Reviewed  ======================================================  PAST MEDICAL & SURGICAL HISTORY:  Hypertension      High cholesterol      Diabetes mellitus      Major depression      Stage 1 chronic kidney disease      Obstructive sleep apnea      History of OCD (obsessive compulsive disorder)      Hypothyroidism      History of cholecystectomy      Sacral nerve stimulator present      History of repair of hiatal hernia      History of suburethral sling procedure        ====================ASSESSMENT ==============  54 year old female PMH includes HLD, HTN, Hypothyroidism, OCD, LORA, type 2 diabetes mellitus, gastroesophageal reflux disease and hiatal hernia. She is s/p robot-assisted hiatal hernia repair with Toupet fundoplication on 4/24/2023. As per chart "Postoperative endoscopy was performed on 10/4/23 due to complaints of food getting stuck, which was negative for any obvious abnormalities and biopsies showed only inactive gastritis with no significant pathology". Patient reports nausea and dry heaving, denies vomiting. Reports felling like food gets stuck when she eats. States she was seen in [facility] in Feb. 2024 due to worsening symptoms, and a CT was performed, as per patient CT demonstrated  a mild to moderate hiatal hernia. She denies abdominal pain, fever or chills. She is now s/p upper endoscopy re-do hiatal hernia repair with fundoplication on 5/10/24 with Dr. Lr.    ---History of repair of hiatal hernia  ---Diaphragmatic hernia without obstruction or gangrene.   ---Hypertension.   ---Diabetes mellitus.   --- Hypothyroidism.   ---Post op Hypovolemia  ---Post op respiratory insufficiency       Plan:  - around the clock zofran / reglan  -head of bed > 30 degrees  -pending CT esophagram today  -Chest PT and IS use with bedside nurse.  - restart home meds pending CT esophagram results.  -cont. synthroid.  -Continue GI ppx with Protonix   -DVT ppx with SQH and SCD boots.-    ====================== NEUROLOGY=====================  acetaminophen     Tablet .. 650 milliGRAM(s) Oral every 6 hours PRN Temp greater or equal to 38C (100.4F), Mild Pain (1 - 3)  metoclopramide Injectable 10 milliGRAM(s) IV Push every 8 hours  ondansetron Injectable 4 milliGRAM(s) IV Push every 6 hours    ==================== RESPIRATORY======================  Post op respiratory insufficiency  ====================CARDIOVASCULAR==================  Post op Hypovolemia    ===================HEMATOLOGIC/ONC ===================  Monitor H&H/Plts    heparin   Injectable 5000 Unit(s) SubCutaneous every 8 hours    ===================== RENAL =========================  Continue monitoring urine output, I&OS, BUN/Cr     ==================== GASTROINTESTINAL===================  dextrose 10% Bolus 125 milliLiter(s) IV Bolus once  dextrose 5%. 1000 milliLiter(s) (100 mL/Hr) IV Continuous <Continuous>  dextrose 5%. 1000 milliLiter(s) (50 mL/Hr) IV Continuous <Continuous>  pantoprazole  Injectable 40 milliGRAM(s) IV Push daily    =======================    ENDOCRINE  =====================  dextrose 50% Injectable 25 Gram(s) IV Push once  dextrose 50% Injectable 12.5 Gram(s) IV Push once  dextrose Oral Gel 15 Gram(s) Oral once PRN Blood Glucose LESS THAN 70 milliGRAM(s)/deciliter  glucagon  Injectable 1 milliGRAM(s) IntraMuscular once  insulin lispro (ADMELOG) corrective regimen sliding scale   SubCutaneous every 6 hours  levothyroxine Injectable 100 MICROGram(s) IV Push at bedtime    ========================INFECTIOUS DISEASE================      -Monitor Neurologic status ,   -Head of the bed should remain elevated to 45 degrees,  -Monitor for arrhythmias and monitor parameters for organ perfusion,  -Glycemic control is satisfactory,  -Nutritional goals will be met using po eventually , insure adequate caloric intake and monitor the same ,  -Electrolytes have been repleted as necessary , pain control has been achieved  and wound care has been carried out ,  -Stress ulcer and VTE prophylaxis will be achieved,  -Agressive PT and early mobility and ambulation goals will be met,    I have spent 35 minutes providing acute care for this critically ill patient     Patient requires continuous monitoring with bedside rhythm monitoring, pulse ox monitoring, and intermittent blood gas analysis. Care plan discussed with ICU care team. Patient remained critical and at risk for life threatening decompensation.           
54yFemale seen and evaluated bedside. Endorsing no acute complaints. Denies any chest pain, palpitations, orthopnea, dyspnea on exertion, shortness of breath, wheezing, abd pain, nausea, vomiting, constipation, lightheadedness, headaches, fevers, or chills.     PAST MEDICAL & SURGICAL HISTORY:  Hypertension      High cholesterol      Diabetes mellitus      Major depression      Stage 1 chronic kidney disease      Obstructive sleep apnea      History of OCD (obsessive compulsive disorder)      Hypothyroidism      History of cholecystectomy      Sacral nerve stimulator present      History of repair of hiatal hernia      History of suburethral sling procedure          Medications:  dextrose 10% Bolus 125 milliLiter(s) IV Bolus once  dextrose 5%. 1000 milliLiter(s) IV Continuous <Continuous>  dextrose 5%. 1000 milliLiter(s) IV Continuous <Continuous>  dextrose 50% Injectable 25 Gram(s) IV Push once  dextrose 50% Injectable 12.5 Gram(s) IV Push once  dextrose Oral Gel 15 Gram(s) Oral once PRN  glucagon  Injectable 1 milliGRAM(s) IntraMuscular once  heparin   Injectable 5000 Unit(s) SubCutaneous every 8 hours  insulin lispro (ADMELOG) corrective regimen sliding scale   SubCutaneous every 6 hours  lactated ringers. 1000 milliLiter(s) IV Continuous <Continuous>  levothyroxine Injectable 100 MICROGram(s) IV Push at bedtime  metoclopramide Injectable 10 milliGRAM(s) IV Push every 8 hours  ondansetron Injectable 4 milliGRAM(s) IV Push every 6 hours  pantoprazole  Injectable 40 milliGRAM(s) IV Push daily      MEDICATIONS  (PRN):  dextrose Oral Gel 15 Gram(s) Oral once PRN Blood Glucose LESS THAN 70 milliGRAM(s)/deciliter      Daily Review:    Height (cm): 160 (05-10 @ 09:24)  Weight (kg): 99.065 (05-10 @ 09:24)  BMI (kg/m2): 38.7 (05-10 @ 09:24)  BSA (m2): 2.01 (05-10 @ 09:24)                            13.2   7.37  )-----------( 180      ( 10 May 2024 16:00 )             38.7   05-10    137  |  102  |  17.9  ----------------------------<  181<H>  3.9   |  20.0<L>  |  1.18    Ca    8.5      10 May 2024 16:00        PT/INR - ( 10 May 2024 16:00 )   PT: 11.5 sec;   INR: 1.04 ratio         PTT - ( 10 May 2024 16:00 )  PTT:29.7 sec    T(C): 36.6 (05-11-24 @ 00:00), Max: 36.7 (05-10-24 @ 09:24)  HR: 105 (05-11-24 @ 01:00) (81 - 105)  BP: 154/76 (05-11-24 @ 01:00) (130/73 - 176/67)  RR: 19 (05-11-24 @ 01:00) (14 - 21)  SpO2: 93% (05-11-24 @ 01:00) (90% - 98%)  Wt(kg): --    CAPILLARY BLOOD GLUCOSE      POCT Blood Glucose.: 166 mg/dL (10 May 2024 23:53)  POCT Blood Glucose.: 171 mg/dL (10 May 2024 18:25)  POCT Blood Glucose.: 169 mg/dL (10 May 2024 16:05)  POCT Blood Glucose.: 216 mg/dL (10 May 2024 16:03)  POCT Blood Glucose.: 108 mg/dL (10 May 2024 09:32)      I&O's Summary    10 May 2024 07:01  -  11 May 2024 01:52  --------------------------------------------------------  IN: 1275 mL / OUT: 1955 mL / NET: -680 mL        Physical Exam  General: Well appearing, laying in bed on an incline, NAD  Neurological: AOx3, no focal neurological deficits  Cardiovascular: Regular Rate/Rhythm, S1/2 auscultated, No extra heart sounds  Respiratory: CTA b/l over all lung fields, No adventitious breath sounds  Gastrointestinal: Bowel sounds present in all 4 quadrants, Abdomen is soft, non-tender, non-distended  Extremities: No peripheral edema noted, 5/5 strength and full range of motion in all 4 extremities b/l  Vascular: 2+ peripheral pulses b/l in upper and lower extremities, Radial, DP  Incision Sites: Port sites c/d/i, no erythema, no purulence.               
Brief summary:  54yFemale seen and evaluated bedside. Endorsing no acute complaints. Denies any chest pain, palpitations, shortness of breath, wheezing, abd pain, nausea, vomiting, lightheadedness, headaches, fevers, or chills.       PAST MEDICAL & SURGICAL HISTORY:  Hypertension      High cholesterol      Diabetes mellitus      Major depression      Stage 1 chronic kidney disease      Obstructive sleep apnea      History of OCD (obsessive compulsive disorder)      Hypothyroidism      History of cholecystectomy      Sacral nerve stimulator present      History of repair of hiatal hernia      History of suburethral sling procedure          Medications:  acetaminophen     Tablet .. 650 milliGRAM(s) Oral every 6 hours PRN  amLODIPine   Tablet 10 milliGRAM(s) Oral daily  ARIPiprazole 2 milliGRAM(s) Oral at bedtime  atorvastatin 40 milliGRAM(s) Oral at bedtime  dextrose 10% Bolus 125 milliLiter(s) IV Bolus once  dextrose 5%. 1000 milliLiter(s) IV Continuous <Continuous>  dextrose 5%. 1000 milliLiter(s) IV Continuous <Continuous>  dextrose 50% Injectable 25 Gram(s) IV Push once  dextrose 50% Injectable 12.5 Gram(s) IV Push once  dextrose Oral Gel 15 Gram(s) Oral once PRN  fluvoxaMINE 200 milliGRAM(s) Oral daily  fluvoxaMINE 100 milliGRAM(s) Oral at bedtime  gabapentin 300 milliGRAM(s) Oral two times a day  glucagon  Injectable 1 milliGRAM(s) IntraMuscular once  heparin   Injectable 5000 Unit(s) SubCutaneous every 8 hours  insulin lispro (ADMELOG) corrective regimen sliding scale   SubCutaneous every 6 hours  levothyroxine 150 MICROGram(s) Oral daily  losartan 50 milliGRAM(s) Oral daily  metoclopramide Injectable 10 milliGRAM(s) IV Push every 8 hours  ondansetron Injectable 4 milliGRAM(s) IV Push every 6 hours  pantoprazole  Injectable 40 milliGRAM(s) IV Push daily      MEDICATIONS  (PRN):  acetaminophen     Tablet .. 650 milliGRAM(s) Oral every 6 hours PRN Temp greater or equal to 38C (100.4F), Mild Pain (1 - 3)  dextrose Oral Gel 15 Gram(s) Oral once PRN Blood Glucose LESS THAN 70 milliGRAM(s)/deciliter      Daily Review:                                11.8   9.35  )-----------( 178      ( 11 May 2024 02:15 )             35.1   05-11    139  |  105  |  14.3  ----------------------------<  143<H>  4.6   |  19.0<L>  |  0.93    Ca    8.6      11 May 2024 02:15  Mg     1.6     05-11        PT/INR - ( 10 May 2024 16:00 )   PT: 11.5 sec;   INR: 1.04 ratio         PTT - ( 10 May 2024 16:00 )  PTT:29.7 sec    T(C): 36.6 (05-12-24 @ 00:00), Max: 36.7 (05-11-24 @ 07:21)  HR: 83 (05-12-24 @ 00:00) (76 - 105)  BP: 152/83 (05-12-24 @ 00:00) (136/83 - 157/100)  RR: 16 (05-12-24 @ 00:00) (13 - 23)  SpO2: 95% (05-12-24 @ 00:00) (92% - 97%)  Wt(kg): --    CAPILLARY BLOOD GLUCOSE      POCT Blood Glucose.: 107 mg/dL (12 May 2024 00:14)  POCT Blood Glucose.: 120 mg/dL (11 May 2024 18:14)  POCT Blood Glucose.: 124 mg/dL (11 May 2024 11:31)  POCT Blood Glucose.: 118 mg/dL (11 May 2024 05:47)      I&O's Summary    10 May 2024 07:01  -  11 May 2024 07:00  --------------------------------------------------------  IN: 1875 mL / OUT: 2470 mL / NET: -595 mL    11 May 2024 07:01  -  12 May 2024 00:46  --------------------------------------------------------  IN: 735 mL / OUT: 2265 mL / NET: -1530 mL        Physical Exam  General: Well appearing, laying in bed on an incline, NAD  Neurological: AOx3, no focal neurological deficits  Cardiovascular: Regular Rate/Rhythm, S1/2 auscultated, No extra heart sounds  Respiratory: CTA b/l over all lung fields, No adventitious breath sounds  Gastrointestinal: Bowel sounds present in all 4 quadrants, Abdomen is soft, non-tender, non-distended  Extremities: No peripheral edema noted, 5/5 strength and full range of motion in all 4 extremities b/l  Vascular: 2+ peripheral pulses b/l in upper and lower extremities, Radial, DP  Incision Sites: Port sites c/d/i, no erythema, no purulence.

## 2024-05-12 NOTE — PROGRESS NOTE ADULT - PROBLEM SELECTOR PROBLEM 1
Diaphragmatic hernia without obstruction or gangrene
Diaphragmatic hernia without obstruction or gangrene

## 2024-05-12 NOTE — DISCHARGE NOTE PROVIDER - HOSPITAL COURSE
54 year old female PMH includes HLD, HTN, Hypothyroidism, OCD, LORA, type 2 diabetes mellitus, gastroesophageal reflux disease and hiatal hernia. She is s/p robot-assisted hiatal hernia repair with Toupet fundoplication on 4/24/2023. As per chart "Postoperative endoscopy was performed on 10/4/23 due to complaints of food getting stuck, which was negative for any obvious abnormalities and biopsies showed only inactive gastritis with no significant pathology". Patient reports nausea and dry heaving, denies vomiting. Reports felling like food gets stuck when she eats. States she was seen in [facility] in Feb. 2024 due to worsening symptoms, and a CT was performed, as per patient CT demonstrated  a mild to moderate hiatal hernia. She denies abdominal pain, fever or chills. She is now s/p upper endoscopy re-do hiatal hernia repair with fundoplication on 5/10/24 with Dr. Lr. Small right PTX stable on serial xray. Per Dr. Lr, patient is now stable and ready for discharge with outpatient follow up.    < from: CT Chest w/ Oral Cont (05.11.24 @ 09:19) >    IMPRESSION: No extravasation of oral contrast from the esophagus into the   mediastinum is noted.    Small right pneumothorax.    < end of copied text >     54 year old female PMH includes HLD, HTN, Hypothyroidism, OCD, LORA, type 2 diabetes mellitus, gastroesophageal reflux disease and hiatal hernia. She is s/p robot-assisted hiatal hernia repair with Toupet fundoplication on 4/24/2023. As per chart "Postoperative endoscopy was performed on 10/4/23 due to complaints of food getting stuck, which was negative for any obvious abnormalities and biopsies showed only inactive gastritis with no significant pathology". Patient reports nausea and dry heaving, denies vomiting. Reports felling like food gets stuck when she eats. States she was seen in [facility] in Feb. 2024 due to worsening symptoms, and a CT was performed, as per patient CT demonstrated  a mild to moderate hiatal hernia. She denies abdominal pain, fever or chills. She is now s/p upper endoscopy re-do hiatal hernia repair with fundoplication on 5/10/24 with Dr. Lr. Small right PTX stable on serial xray. Per Dr. Lr, patient is now stable and ready for discharge with outpatient follow up.    < from: CT Chest w/ Oral Cont (05.11.24 @ 09:19) >    IMPRESSION: No extravasation of oral contrast from the esophagus into the   mediastinum is noted.    Small right pneumothorax.    < end of copied text >    Constitutional: NAD, well developed, well nourished  Neuro: A+O x 3, non-focal, speech clear and intact  HEENT: NC/AT, PERRL, EOMI, anicteric sclerae, oral mucosa pink and moist  Neck: supple, no JVD  CV: regular rate, regular rhythm, +S1S2, no murmurs or rub  Pulm/chest: lung sounds CTA and equal bilaterally, no accessory muscle use noted  Abd: soft, NT, ND, +BS. +abd incision sites c/d/i, no evidence of infection or bleeding  Ext: KULKARNI x 4, no C/C/E  Skin: warm, well perfused  Psych: calm, appropriate affect

## 2024-05-12 NOTE — DIETITIAN INITIAL EVALUATION ADULT - OTHER INFO
54 year old female PMH includes HLD, HTN, Hypothyroidism, OCD, LORA, type 2 diabetes mellitus, gastroesophageal reflux disease and hiatal hernia. She is s/p robot-assisted hiatal hernia repair with Toupet fundoplication on 4/24/2023. As per chart "Postoperative endoscopy was performed on 10/4/23 due to complaints of food getting stuck, which was negative for any obvious abnormalities and biopsies showed only inactive gastritis with no significant pathology". Patient reports nausea and dry heaving, denies vomiting. Reports felling like food gets stuck when she eats. States she was seen in [facility] in Feb. 2024 due to worsening symptoms, and a CT was performed, as per patient CT demonstrated  a mild to moderate hiatal hernia. She denies abdominal pain, fever or chills. She is now s/p upper endoscopy re-do hiatal hernia repair with fundoplication on 5/10/24 with Dr. Lr.

## 2024-05-12 NOTE — DISCHARGE NOTE PROVIDER - DETAILS OF MALNUTRITION DIAGNOSIS/DIAGNOSES
This patient has been assessed with a concern for Malnutrition and was treated during this hospitalization for the following Nutrition diagnosis/diagnoses:     -  05/12/2024: Moderate protein-calorie malnutrition

## 2024-05-12 NOTE — DISCHARGE NOTE PROVIDER - CARE PROVIDER_API CALL
Rolando Lr  Thoracic Surgery  [address] [place] [id number]  Phone: [phone]  Fax: [phone]  Follow Up Time:

## 2024-05-12 NOTE — PROGRESS NOTE ADULT - PROBLEM SELECTOR PLAN 5
Continue GI ppx with Protonix   DVT ppx with SQH and SCD boots
Continue GI ppx with Protonix   DVT ppx with SQH and SCD boots

## 2024-05-12 NOTE — DISCHARGE NOTE PROVIDER - NSDCFUSCHEDAPPT_GEN_ALL_CORE_FT
Andrew Murillo  [facility]  ORTHOSURG [address]  Scheduled Appointment: 05/15/2024    Shereen Head  [facility]  UROGYN [address]  Scheduled Appointment: 06/06/2024    Love Obando  [facility]  ENDOCRIN [address]  Scheduled Appointment: 06/27/2024    Rolando Lr  [facility]  THORSURG [address]  Scheduled Appointment: 08/01/2024

## 2024-05-12 NOTE — DIETITIAN INITIAL EVALUATION ADULT - PERTINENT MEDS FT
MEDICATIONS  (STANDING):  amLODIPine   Tablet 10 milliGRAM(s) Oral daily  ARIPiprazole 2 milliGRAM(s) Oral at bedtime  atorvastatin 40 milliGRAM(s) Oral at bedtime  dextrose 10% Bolus 125 milliLiter(s) IV Bolus once  dextrose 5%. 1000 milliLiter(s) (100 mL/Hr) IV Continuous <Continuous>  dextrose 5%. 1000 milliLiter(s) (50 mL/Hr) IV Continuous <Continuous>  fluvoxaMINE 200 milliGRAM(s) Oral daily  fluvoxaMINE 100 milliGRAM(s) Oral at bedtime  glucagon  Injectable 1 milliGRAM(s) IntraMuscular once  insulin lispro (ADMELOG) corrective regimen sliding scale   SubCutaneous every 6 hours  levothyroxine 150 MICROGram(s) Oral daily  losartan 50 milliGRAM(s) Oral daily  metoclopramide Injectable 10 milliGRAM(s) IV Push every 8 hours  ondansetron Injectable 4 milliGRAM(s) IV Push every 6 hours  pantoprazole  Injectable 40 milliGRAM(s) IV Push daily

## 2024-05-12 NOTE — DISCHARGE NOTE PROVIDER - NSDCCPTREATMENT_GEN_ALL_CORE_FT
PRINCIPAL PROCEDURE  Procedure: Repair, hiatal hernia, robot-assisted  Findings and Treatment: EGD, Re-op Robotic hiatal hernia repair with resection of fundus, lysis of adhesions, Toupet fundoplication     DC instructions

## 2024-05-12 NOTE — DISCHARGE NOTE PROVIDER - NSDCCPCAREPLAN_GEN_ALL_CORE_FT
PRINCIPAL DISCHARGE DIAGNOSIS  Diagnosis: Diaphragmatic hernia without obstruction or gangrene  Assessment and Plan of Treatment: You had a hiatal hernia repair with Dr. Lr. Please follow these instructions on discharge:  1. You have small incisions on your abdomen that were closed with skin glue. The skin glue will peel on its own.   2. Shower daily. Wash incisions with soap and water. No rubbing of the incisions. Pat dry.  3. Continue a full liquid diet for 2-3 days from discharge. If you are tolerating full liquids, you may start soft foods but no red meats. Cut food into small bite sized portions. Smaller meals throughout the day are better than large meals. Continue this until your follow up visit.  4. Avoid foods that trigger heartburn (fatty or fried foods, etc).  5. Continue to use your incentive spirometer, deep breathe, cough, and walk often. [place] more frequent walks are better.  6. No heavy lifting for two weeks after surgery.     PRINCIPAL DISCHARGE DIAGNOSIS  Diagnosis: Diaphragmatic hernia without obstruction or gangrene  Assessment and Plan of Treatment: You had a hiatal hernia repair with Dr. Lr. Please follow these instructions on discharge:  1. You have small incisions on your abdomen that were closed with skin glue. The skin glue will peel on its own.   2. Shower daily. Wash incisions with soap and water. No rubbing of the incisions. Pat dry.  3. Continue a full liquid diet for 2-3 days from discharge. If you are tolerating full liquids, you may start soft foods but no red meats. Cut food into small bite sized portions. Smaller meals throughout the day are better than large meals. Continue this until your follow up visit.  4. Avoid foods that trigger heartburn (fatty or fried foods, etc).  5. Continue to use your incentive spirometer, deep breathe, cough, and walk often. [place] more frequent walks are better.  6. No heavy lifting for two weeks after surgery.  - Full liquid diet for 2 more days. If you are feeling well on full liquids you may start taking in soft foods. - Please refer to list of foods provided by RN.   - Please take in small frequent meals. No carbonated beverages. Avoid spicy foods.   - Activity as tolerated.   - You may crush pills or open capsules to sprinkle on food if swallowing pills is too difficult.   - Please do not drive until your pain is well controlled, and you do not need to take pain medications. These medications may make you constipated. If so, please take over the counter stool softeners for symptoms.   - NOTIFY YOUR SURGEON IF: You have any bleeding that does not stop, any pus draining from your wound, any fever (over 100.4 F) or chills, persistent nausea/vomiting, persistent diarrhea, or if your pain is not controlled on your discharge pain medications.

## 2024-05-12 NOTE — DISCHARGE NOTE NURSING/CASE MANAGEMENT/SOCIAL WORK - NSPROEXTENSIONSOFSELF_GEN_A_NUR
Allergies   Allergen Reactions    Bactrim Anaphylaxis    Mupirocin Swelling and Other (See Comments)     Per H&P, caused him not to be able to see  Redness      Neomycin-Bacitracin Zn-Polymyx      Other reaction(s): Vision Problem  THE DROPS    Polysporin [Bacitracin-Polymyxin B] Other (See Comments)     Causes loss vision  When used in eyes redness    Silver     Sulfa Antibiotics Dermatitis    Tape [Adhesive Tape]      Red bumps can use paper tape     Wound Dressings Dermatitis     Skin peeled off with dressing.     Ancef [Cefazolin Sodium] Rash       PSHx:    Past Surgical History:   Procedure Laterality Date    ABDOMEN SURGERY      Bowel surgery where a portion of his bowel was removed    APPENDECTOMY      BACK SURGERY      twice    CARDIAC SURGERY      bypass x4    CATARACT REMOVAL WITH IMPLANT  2011    right eye    CHOLECYSTECTOMY, LAPAROSCOPIC  12/5/2013    w/cholangiogram    ELBOW SURGERY  5/10/11    Left ulnar nerve decompression    EYE SURGERY Bilateral 12/20/11    Left Eye Cataract Removal    JOINT REPLACEMENT Left toe metatarsal    SHOULDER ARTHROPLASTY Right     SPINE SURGERY  01/2019    Spinal cord stimulator    TOTAL KNEE ARTHROPLASTY Bilateral     UVULOPALATOPHARYGOPLASTY         Social Hx:    Social History     Socioeconomic History    Marital status:      Spouse name: Not on file    Number of children: 2    Years of education: Not on file    Highest education level: Not on file   Occupational History    Occupation: retired   Social Needs    Financial resource strain: Not on file    Food insecurity:     Worry: Not on file     Inability: Not on file   "[employer] needs:     Medical: Not on file     Non-medical: Not on file   Tobacco Use    Smoking status: Never Smoker    Smokeless tobacco: Never Used   Substance and Sexual Activity    Alcohol use: No    Drug use: No    Sexual activity: Not Currently   Lifestyle    Physical activity:     Days per week: procedure and sedation plan. Risks/benefits/alternatives of procedure discussed with patient and any present family members. Risks including, but not limited to: bleeding, perforation, post polypectomy syndrome, splenic injury, need for additional procedures or surgery, risks of anesthesia. Patient understands it is their responsibility to call office for pathology results if they do not hear from my office within 1-2 weeks. All questions answered.     Yumiko Henry MD  5/6/2019 none

## 2024-05-12 NOTE — PROGRESS NOTE ADULT - PROBLEM SELECTOR PLAN 1
around the clock zofran / reglan  head of bed > 30 degrees  pending CT esophagram in AM   Encourage PO intake - clears pending results of CT scan  Encourage OOB to chair and ambulation  Encourage deep breathing exercised and coughing  Chest PT and IS use with bedside nurse
around the clock zofran / reglan  head of bed > 30 degrees  CT esophagram with no leak  Encourage PO intake - clears   Encourage OOB to chair and ambulation  Encourage deep breathing exercised and coughing  Chest PT and IS use with bedside nurse

## 2024-05-12 NOTE — DIETITIAN NUTRITION RISK NOTIFICATION - ADDITIONAL COMMENTS/DIETITIAN RECOMMENDATIONS
Advance diet as medically feasible/tolerable.  Ensure Clear TID (240 kcal, 8g protein per serving).  Rx: MVI daily. Encourage po intake, monitor diet tolerance, and provide assistance at meals as needed. Obtain daily weights to monitor trends.

## 2024-05-15 LAB — SURGICAL PATHOLOGY STUDY: SIGNIFICANT CHANGE UP

## 2024-05-23 ENCOUNTER — LABORATORY RESULT (OUTPATIENT)
Age: 54
End: 2024-05-23

## 2024-05-23 ENCOUNTER — APPOINTMENT (OUTPATIENT)
Dept: THORACIC SURGERY | Facility: CLINIC | Age: 54
End: 2024-05-23
Payer: MEDICARE

## 2024-05-23 VITALS
BODY MASS INDEX: 38.98 KG/M2 | SYSTOLIC BLOOD PRESSURE: 125 MMHG | HEART RATE: 93 BPM | RESPIRATION RATE: 16 BRPM | HEIGHT: 63 IN | OXYGEN SATURATION: 97 % | DIASTOLIC BLOOD PRESSURE: 84 MMHG | WEIGHT: 220 LBS

## 2024-05-23 PROCEDURE — 99024 POSTOP FOLLOW-UP VISIT: CPT

## 2024-05-23 NOTE — PHYSICAL EXAM
[Respiration, Rhythm And Depth] : normal respiratory rhythm and effort [Auscultation Breath Sounds / Voice Sounds] : lungs were clear to auscultation bilaterally [Heart Rate And Rhythm] : heart rate was normal and rhythm regular [Clean] : clean [Dry] : dry [Healing Well] : healing well

## 2024-05-24 RX ORDER — ONDANSETRON 8 MG/1
8 TABLET ORAL
Qty: 30 | Refills: 0 | Status: ACTIVE | COMMUNITY
Start: 2024-05-24 | End: 1900-01-01

## 2024-05-24 NOTE — ASSESSMENT
[FreeTextEntry1] : Ms Freitas has been experiencing nausea without vomiting over the past week. She is able to eat soft foods but has not tried to advance her diet due to the nausea. She has also been having episodes of occasional diarrhea which is her baseline. We discussed adding Zofran to her regiment temporarily and low acidic foods. She is agreeable and will follow up in the next 2 weeks with CXR for clinical evaluation. Overall I am happy with her progress and she is healing well.   PLAN: - Zofran as needed - Return to care in 2 weeks - CXR with next visit     I, Dr. Lr personally performed the evaluation and management (E/M) services for this patient. That E/M includes conducting the initial examination, assessing all conditions, and establishing the plan of care. Today, Solitario Barker NP was here to observe my evaluation and management services for this patient.

## 2024-05-24 NOTE — REASON FOR VISIT
[de-identified] : Upper endoscopy with redo hiatal hernia repair, lysis of adhesion, Toupet fundoplication, and partial fundus resection. [de-identified] : 5/10/2024 [de-identified] : 54 year old female PMH includes HLD, HTN, Hypothyroidism, OCD, LORA, type 2 diabetes mellitus, gastroesophageal reflux disease and hiatal hernia status post repair in 4/2023 with recurrence and symptoms of dysphagia. She then underwent the above procedure. Small right PTX stable on serial x-ray postoperatively. Discharged 5/12/2024.

## 2024-05-28 ENCOUNTER — NON-APPOINTMENT (OUTPATIENT)
Age: 54
End: 2024-05-28

## 2024-05-28 NOTE — ED PROVIDER NOTE - CPE EDP ENMT NORM
No care due was identified.  [facility] Embedded Care Due Messages. Reference number: [id number].   5/28/2024 8:29:03 AM CDT  
Please see pend medication  LOV 05/07/2024  NOV 05/12/2025  
normal...

## 2024-05-29 ENCOUNTER — NON-APPOINTMENT (OUTPATIENT)
Age: 54
End: 2024-05-29

## 2024-05-29 DIAGNOSIS — R11.2 NAUSEA WITH VOMITING, UNSPECIFIED: ICD-10-CM

## 2024-05-29 DIAGNOSIS — K21.9 GASTRO-ESOPHAGEAL REFLUX DISEASE W/OUT ESOPHAGITIS: ICD-10-CM

## 2024-05-30 ENCOUNTER — NON-APPOINTMENT (OUTPATIENT)
Age: 54
End: 2024-05-30

## 2024-05-30 ENCOUNTER — RX RENEWAL (OUTPATIENT)
Age: 54
End: 2024-05-30

## 2024-05-30 PROBLEM — Z98.890 HISTORY OF REPAIR OF HIATAL HERNIA: Status: ACTIVE | Noted: 2023-05-25

## 2024-05-30 RX ORDER — SEMAGLUTIDE 0.68 MG/ML
2 INJECTION, SOLUTION SUBCUTANEOUS
Qty: 3 | Refills: 0 | Status: ACTIVE | COMMUNITY
Start: 2024-03-28 | End: 1900-01-01

## 2024-06-05 ENCOUNTER — NON-APPOINTMENT (OUTPATIENT)
Age: 54
End: 2024-06-05

## 2024-06-05 ENCOUNTER — RESULT CHARGE (OUTPATIENT)
Age: 54
End: 2024-06-05

## 2024-06-06 ENCOUNTER — OUTPATIENT (OUTPATIENT)
Dept: OUTPATIENT SERVICES | Facility: HOSPITAL | Age: 54
LOS: 1 days | End: 2024-06-06
Payer: MEDICARE

## 2024-06-06 ENCOUNTER — APPOINTMENT (OUTPATIENT)
Dept: THORACIC SURGERY | Facility: CLINIC | Age: 54
End: 2024-06-06
Payer: MEDICARE

## 2024-06-06 ENCOUNTER — NON-APPOINTMENT (OUTPATIENT)
Age: 54
End: 2024-06-06

## 2024-06-06 ENCOUNTER — APPOINTMENT (OUTPATIENT)
Dept: UROGYNECOLOGY | Facility: CLINIC | Age: 54
End: 2024-06-06
Payer: MEDICARE

## 2024-06-06 ENCOUNTER — APPOINTMENT (OUTPATIENT)
Dept: UROGYNECOLOGY | Facility: CLINIC | Age: 54
End: 2024-06-06

## 2024-06-06 VITALS
BODY MASS INDEX: 35.97 KG/M2 | DIASTOLIC BLOOD PRESSURE: 89 MMHG | HEART RATE: 74 BPM | WEIGHT: 203 LBS | SYSTOLIC BLOOD PRESSURE: 146 MMHG | HEIGHT: 63 IN | OXYGEN SATURATION: 97 % | TEMPERATURE: 97.2 F | RESPIRATION RATE: 18 BRPM

## 2024-06-06 DIAGNOSIS — N39.46 MIXED INCONTINENCE: ICD-10-CM

## 2024-06-06 DIAGNOSIS — Z96.82 PRESENCE OF NEUROSTIMULATOR: Chronic | ICD-10-CM

## 2024-06-06 DIAGNOSIS — Z87.19 OTHER SPECIFIED POSTPROCEDURAL STATES: ICD-10-CM

## 2024-06-06 DIAGNOSIS — N39.3 STRESS INCONTINENCE (FEMALE) (MALE): ICD-10-CM

## 2024-06-06 DIAGNOSIS — Z98.890 OTHER SPECIFIED POSTPROCEDURAL STATES: ICD-10-CM

## 2024-06-06 DIAGNOSIS — R15.9 FULL INCONTINENCE OF FECES: ICD-10-CM

## 2024-06-06 DIAGNOSIS — R11.2 NAUSEA WITH VOMITING, UNSPECIFIED: ICD-10-CM

## 2024-06-06 DIAGNOSIS — K44.9 DIAPHRAGMATIC HERNIA WITHOUT OBSTRUCTION OR GANGRENE: ICD-10-CM

## 2024-06-06 DIAGNOSIS — Z98.890 OTHER SPECIFIED POSTPROCEDURAL STATES: Chronic | ICD-10-CM

## 2024-06-06 PROCEDURE — 99214 OFFICE O/P EST MOD 30 MIN: CPT

## 2024-06-06 PROCEDURE — 71046 X-RAY EXAM CHEST 2 VIEWS: CPT | Mod: 26

## 2024-06-06 PROCEDURE — 99024 POSTOP FOLLOW-UP VISIT: CPT

## 2024-06-06 PROCEDURE — 71046 X-RAY EXAM CHEST 2 VIEWS: CPT

## 2024-06-06 RX ORDER — CHOLESTYRAMINE 4 G/5.5G
POWDER, FOR SUSPENSION ORAL DAILY
Refills: 0 | Status: ACTIVE | COMMUNITY

## 2024-06-06 NOTE — PHYSICAL EXAM
[FreeTextEntry1] : General: Not in acute distress, alert and oriented x3. Abdomen: Soft, nontender, and nondistended. No obvious hepatosplenomegaly. No obvious hernias. Pelvic Exam: Normal external female genitalia. Saddle sensory exam S2 to S4 is intact. Perineal reflexes not visualized. Urethra is well supported without prolapse, exudates, or lesions. Cough stress test is negative.  Post void residual was checked with I/O cath and was  20 cc of clear urine. Pale and mildly atrophic-appearing vaginal epithelium. No vaginal blood or discharge.  Vaginal cuff has healed well.  No evidence of sling erosion or tension.  No tenderness along the sling pathway.  All vaginal compartments are well supported

## 2024-06-06 NOTE — DISCUSSION/SUMMARY
[FreeTextEntry1] : [] Voided urine sample is sent to the lab for UA and culture to rule out UTI.  If she has evidence of urinary tract infection, will consider treating with nitrofurantoin 100 mg p.o. twice daily for 7 days or Keflex 500 mg p.o. twice daily for 7 days. [] Pt had [facility] SNM procedure in 2022 with Dr. Schulz. Pt notes returning of urinary and fecal urgency. Advised pt to call [facility] Representative Shara to discuss symptoms and change of settings.    [] Advised to take Metamucil/Citrucel daily to have more formed stool. Explained her that taking Metamucil will not give her diarrhea even if taken in increased doses.  [] Follow up in one month to reevaluate urinary urgency []  Instructed to call with any questions or concerns and she verbalizes understanding.

## 2024-06-06 NOTE — ASSESSMENT
[FreeTextEntry1] : I had the pleasure of evaluating Ms. MORA in the office today for postoperative follow up.   Briefly, this is a 54 year old female who had a reoperative robot assisted hiatal hernia repair with a Toupet fundoplication and partial fundus resection on 5/10.   Today we discussed postoperative expectations and progress. Healing from this procedure is different for everyone. I feel Ms. MORA is making good progress. I am confident in time she will continue to improve.   I counseled the patient on diet, activity and habits that may also impact healing. I would like to monitor her progress closely. All questions answered and concerns addressed.   Plan - Return to care in 1 month with a chest x-ray for continued clinical evaluation and monitoring. - The patient was reminded to call if any concerns or issues arise. - The patient expressed understanding of and agreement to the plan moving forward   I, Dr. Rolando Lr, personally performed the evaluation and management (E/M) services for this established patient who presents today with an existing condition.  That E/M includes conducting the examination, assessing all conditions, and establishing a new plan of care.  Today, Austin Clements PA-C, was here to observe my evaluation and management services for this/these condition(s) to be followed going forward.

## 2024-06-06 NOTE — DISCUSSION/SUMMARY
[FreeTextEntry1] : Accession:                             [id number]  Collected Date/Time:                   5/10/2024 17:00 EDT Received Date/Time:                    5/13/2024 05:45 EDT  Surgical Pathology Report - Auth (Verified)  Specimen(s) Submitted 1  Portion of fundus 2  Intraabdominal hernia  Final Diagnosis 1.  Portion of fundus, excision: -   Fibromuscular tissue with polarizable suture material.   2.  Intra-abdominal hernia, excision: -   Fibroadipose tissue. Verified by: Carlitos Zuleta M.D. (Electronic Signature) Reported on: 05/15/24 16:01 EDT

## 2024-06-06 NOTE — ASSESSMENT
[FreeTextEntry1] : Julianna had mid-urethral sling with cystoscopy on 01/16/24 and feels great. Denies urinary leakage with ambulation/sneezing and coughing. Pt notes urinary urgency and fecal urgency for last couple of months. She had [facility] SNM procedure in 2022 and haven't spoken with [facility] rep in last six months.

## 2024-06-06 NOTE — HISTORY OF PRESENT ILLNESS
[FreeTextEntry1] : Patient is a 53-year-old nulligravida with past medical history significant for obesity, hypertension, hyperlipidemia, diabetes, severe obstructive sleep apnea, exertional shortness of breath s/p cardiac cath, who Underwent retropubic mid urethral sling placement with cystoscopy on 1/16/2024. She was discharged home with a Amaya catheter. She passed the voiding trial on 1/19/2024.  Pt presents today for four-month post operative follow up. Pt denies leaking urine with coughing/sneezing and ambulation. Admits to feeling satisfied after voiding. States her steam is normal and notes no dribbling post voiding. Pt states she is noticing increase in her urgency for last couple of months. States she leaks urine with urgency. Pt also notes fecal urgency and have fecal incontinence at times. Pt had [facility] surgery with Dr. Schulz in 2022 for fecal urgency/incontinence and urinary urgency. Pt states she spoke with [employer] rep Shara shah six months ago. States she has IBS and takes medication that she doesn't remember. States she has most of the time diarrhea alternating with constipation. Denies urinary burning, dysuria and pelvic pain.

## 2024-06-06 NOTE — REASON FOR VISIT
[de-identified] : Upper endoscopy with redo hiatal hernia repair, lysis of adhesion, Toupet fundoplication, and partial fundus resection [de-identified] : 5/10/24 [de-identified] :  54 year old female PMH includes HLD, HTN, Hypothyroidism, OCD, LORA, type 2 diabetes mellitus, gastroesophageal reflux disease and hiatal hernia status post repair in 4/2023 with recurrence and symptoms of dysphagia. She then underwent the above procedure. Small right PTX stable on serial x-ray postoperatively. Discharged 5/12/2024.  Today she reports feeling much better. She did have some issues with eating if she doesn't cut the food small enough. She reports that the nausea, vomiting, pain and overall not feeling well has improved. Patient denies chest pain, palpitations, shortness of breath, cough, fevers, chills, fatigue and unintentional weight loss or gain.

## 2024-06-06 NOTE — PHYSICAL EXAM
[] : no respiratory distress [Respiration, Rhythm And Depth] : normal respiratory rhythm and effort [Auscultation Breath Sounds / Voice Sounds] : lungs were clear to auscultation bilaterally [Heart Rate And Rhythm] : heart rate was normal and rhythm regular [Heart Sounds] : normal S1 and S2 [Site: ___] : Site: [unfilled] [Clean] : clean [Dry] : dry [No Edema] : no edema [Bleeding] : no active bleeding [Foul Odor] : no foul smell [Purulent Drainage] : no purulent drainage [Serosanguinous Drainage] : no serosanguinous drainage [Erythema] : not erythematous [Warm] : not warm [Tender] : not tender [FreeTextEntry1] : abd soft nontender, nondistend + bowel sounds. + ventral hernia stable

## 2024-06-07 LAB
APPEARANCE: CLEAR
BACTERIA: NEGATIVE /HPF
BILIRUBIN URINE: NEGATIVE
BLOOD URINE: NEGATIVE
CALCIUM OXALATE CRYSTALS: PRESENT
CAST: 0 /LPF
COLOR: YELLOW
EPITHELIAL CELLS: 0 /HPF
GLUCOSE QUALITATIVE U: NEGATIVE MG/DL
KETONES URINE: ABNORMAL MG/DL
LEUKOCYTE ESTERASE URINE: NEGATIVE
MICROSCOPIC-UA: NORMAL
NITRITE URINE: NEGATIVE
PH URINE: 6
PROTEIN URINE: 30 MG/DL
RED BLOOD CELLS URINE: 3 /HPF
REVIEW: NORMAL
SPECIFIC GRAVITY URINE: >1.03
UROBILINOGEN URINE: 0.2 MG/DL
WHITE BLOOD CELLS URINE: 2 /HPF

## 2024-06-10 DIAGNOSIS — R35.0 FREQUENCY OF MICTURITION: ICD-10-CM

## 2024-06-11 ENCOUNTER — APPOINTMENT (OUTPATIENT)
Dept: ORTHOPEDIC SURGERY | Facility: CLINIC | Age: 54
End: 2024-06-11

## 2024-06-13 ENCOUNTER — APPOINTMENT (OUTPATIENT)
Dept: ORTHOPEDIC SURGERY | Facility: CLINIC | Age: 54
End: 2024-06-13
Payer: MEDICARE

## 2024-06-13 DIAGNOSIS — M17.11 UNILATERAL PRIMARY OSTEOARTHRITIS, RIGHT KNEE: ICD-10-CM

## 2024-06-13 PROCEDURE — 99213 OFFICE O/P EST LOW 20 MIN: CPT

## 2024-06-13 NOTE — DISCUSSION/SUMMARY
[Medication Risks Reviewed] : Medication risks reviewed [Surgical risks reviewed] : Surgical risks reviewed [de-identified] : 54-year-old female presents to the office for follow-up of moderate right knee osteoarthritis.  She has an excellent resolution of her symptoms from viscosupplementation.  She would therefore like to continue with conservative therapy and I think that is indicated.  I recommend she continue with her at home exercise regimen, focusing on low impact activity exercise.  Continue to use Voltaren gel as needed for pain.  She was advised that she will be eligible for repeat viscosupplementation in August.  I recommend the patient follow-up in 3 months for repeat evaluation possible reinitiation of viscosupplementation.  All questions were addressed with the patient and her friend, they both verbalized understanding and agreement with the plan.

## 2024-06-13 NOTE — REVIEW OF SYSTEMS
[Negative] : Heme/Lymph [Joint Pain] : no joint pain [Joint Stiffness] : no joint stiffness [Joint Swelling] : no joint swelling [FreeTextEntry9] : Right knee

## 2024-06-13 NOTE — PHYSICAL EXAM
[Normal] : Gait: normal [de-identified] : Right knee exam shows mild effusion, ROM is 0-120 degrees, no instability, pain with Bettina, medial joint line tenderness. 5/5 motor strength in bilateral lower extremities. Sensory: Intact in bilateral lower extremities. DTRs: Biceps, brachioradialis, triceps, patellar, ankle and plantar 2+ and symmetric bilaterally. Pulses: dorsalis pedis, posterior tibial, femoral, popliteal, and radial 2+ and symmetric bilaterally.

## 2024-06-13 NOTE — HISTORY OF PRESENT ILLNESS
[de-identified] : 54-year-old female presents to the office for follow-up of right knee osteoarthritis.  She completed a series of viscosupplementation in February with excellent resolution of symptoms.  He said her pain is completely resolved.  She is able to go about her activities of daily living without issue and her quality of life has improved.  She is not currently taking anything for pain.  Ambulates without the use of assistive device.  Participates in at home exercise regimen is much as tolerated.  Denies any significant changes to her medical history since her last visit recent trauma or injuries, neurovascular compromise.

## 2024-06-18 ENCOUNTER — RX RENEWAL (OUTPATIENT)
Age: 54
End: 2024-06-18

## 2024-06-18 PROBLEM — K44.9 HIATAL HERNIA: Status: ACTIVE | Noted: 2023-05-11

## 2024-06-18 RX ORDER — OMEPRAZOLE 20 MG/1
20 CAPSULE, DELAYED RELEASE ORAL TWICE DAILY
Qty: 60 | Refills: 0 | Status: ACTIVE | COMMUNITY
Start: 2024-03-14 | End: 1900-01-01

## 2024-06-19 LAB
APPEARANCE: CLEAR
BACTERIA: NEGATIVE /HPF
BILIRUBIN URINE: NEGATIVE
BLOOD URINE: NEGATIVE
CALCIUM OXALATE CRYSTALS: PRESENT
CAST: 2 /LPF
COLOR: YELLOW
EPITHELIAL CELLS: 1 /HPF
GLUCOSE QUALITATIVE U: NEGATIVE MG/DL
KETONES URINE: NEGATIVE MG/DL
LEUKOCYTE ESTERASE URINE: ABNORMAL
MICROSCOPIC-UA: NORMAL
NITRITE URINE: NEGATIVE
PH URINE: 5.5
PROTEIN URINE: NORMAL MG/DL
RED BLOOD CELLS URINE: NORMAL /HPF
REVIEW: NORMAL
SPECIFIC GRAVITY URINE: 1.02
UROBILINOGEN URINE: 0.2 MG/DL
WHITE BLOOD CELLS URINE: 2 /HPF

## 2024-06-20 ENCOUNTER — APPOINTMENT (OUTPATIENT)
Dept: THORACIC SURGERY | Facility: CLINIC | Age: 54
End: 2024-06-20
Payer: MEDICARE

## 2024-06-20 VITALS
BODY MASS INDEX: 36.32 KG/M2 | SYSTOLIC BLOOD PRESSURE: 126 MMHG | HEART RATE: 96 BPM | OXYGEN SATURATION: 98 % | DIASTOLIC BLOOD PRESSURE: 82 MMHG | HEIGHT: 63 IN | WEIGHT: 205 LBS | TEMPERATURE: 97.6 F | RESPIRATION RATE: 18 BRPM

## 2024-06-20 DIAGNOSIS — K44.9 DIAPHRAGMATIC HERNIA W/OUT OBSTRUCTION OR GANGRENE: ICD-10-CM

## 2024-06-20 DIAGNOSIS — R18.8 OTHER ASCITES: ICD-10-CM

## 2024-06-20 LAB — BACTERIA UR CULT: NORMAL

## 2024-06-20 PROCEDURE — 99024 POSTOP FOLLOW-UP VISIT: CPT

## 2024-06-20 RX ORDER — METRONIDAZOLE 250 MG/1
250 TABLET ORAL
Refills: 0 | Status: ACTIVE | COMMUNITY
Start: 2024-06-20

## 2024-06-20 RX ORDER — CIPROFLOXACIN HYDROCHLORIDE 500 MG/1
500 TABLET, FILM COATED ORAL
Qty: 5 | Refills: 0 | Status: COMPLETED | COMMUNITY
Start: 2024-06-10 | End: 2024-06-20

## 2024-06-20 RX ORDER — CEPHALEXIN 500 MG/1
500 TABLET ORAL
Qty: 14 | Refills: 0 | Status: ACTIVE | COMMUNITY
Start: 2024-06-20 | End: 1900-01-01

## 2024-06-20 NOTE — ASSESSMENT
[FreeTextEntry1] : I had the pleasure of evaluating Ms. MORA in the office today for postoperative follow up.   Briefly, this is a 54 year old female who had a robot assisted hiatal hernia repair on 5/10/2024.   Today we discussed postoperative expectations and progress. The [place] CT scan report was reviewed however the images were not available for evaluation. On physical exam, there is a small area of fluctuation deep to the umbilical wound. If it is an abscess, it does not seem to be very large or superficial. I believe a course of antibiotics are warranted.  Healing from this procedure is different for everyone. I feel Ms. MORA is making slow progress. I am confident in time she will continue to improve.   I counseled the patient on diet, activity and habits that may also impact healing. I would like to monitor her progress closely. All questions answered and concerns addressed.   Plan - Return to care in 1 week for continued clinical evaluation and monitoring. - The patient was reminded to call if any concerns or issues arise. - The patient expressed understanding of and agreement to the plan moving forward   I, Dr. Rolando Lr, personally performed the evaluation and management (E/M) services for this established patient who presents today with an existing condition.  That E/M includes conducting the examination, assessing all conditions, and establishing a new plan of care.  Today, Austin Clements PA-C, was here to observe my evaluation and management services for this/these condition(s) to be followed going forward.

## 2024-06-20 NOTE — DISCUSSION/SUMMARY
[Doing Well] : is doing well [Excellent Pain Control] : has excellent pain control [No Sign of Infection] : is showing no signs of infection [FreeTextEntry1] : CT ABDOMEN & PELVIS with Contrast performed at [facility] on 6/14/24: IMPRESSION: Development of possible abscess within the anterior abdominal wall hernia.  Anterior abdominal wall hernia containing fat and with the neck measuring 2.2 cm. Within the hernia is a peripherally enhancing fluid collection measuring 3.0 x 2.7 cm       Accession: [id number] Collected Date/Time: 5/10/2024 17:00 EDT Received Date/Time: 5/13/2024 05:45 EDT  Surgical Pathology Report - Auth (Verified)  Specimen(s) Submitted 1 Portion of fundus 2 Intraabdominal hernia  Final Diagnosis 1. Portion of fundus, excision: - Fibromuscular tissue with polarizable suture material.   2. Intra-abdominal hernia, excision: - Fibroadipose tissue. Verified by: Carlitos Zuleta M.D. (Electronic Signature) Reported on: 05/15/24 16:01 EDT.

## 2024-06-20 NOTE — PHYSICAL EXAM
[Respiration, Rhythm And Depth] : normal respiratory rhythm and effort [Heart Rate And Rhythm] : heart rate was normal and rhythm regular [Site: ___] : Site: [unfilled] [Clean] : clean [Dry] : dry [Healing Well] : healing well [No Edema] : no edema [FreeTextEntry1] : mild tenderness and small fluctuance noted deep to supraumbilical incision.

## 2024-06-20 NOTE — REASON FOR VISIT
[Parent] : parent [de-identified] : Upper endoscopy with redo hiatal hernia repair, lysis of adhesion, Toupet fundoplication, and partial fundus resection [de-identified] : 5/10/24 [de-identified] :  54 year old female PMH includes HLD, HTN, Hypothyroidism, OCD, LORA, type 2 diabetes mellitus, gastroesophageal reflux disease and hiatal hernia status post repair in 4/2023 with recurrence and symptoms of dysphagia. She then underwent the above procedure. Small right PTX stable on serial x-ray postoperatively. Discharged 5/12/2024.  She presented to [facility] ED on 6/13/24 with nausea, diarrhea, and abdominal pain. She subsequently had CT imaging noting development of possible abscess within the anterior abdominal wall hernia.  Today she reports feeling very dizzy and nauseous. She went to [facility] and a week ago with these complaints and a CT scan was done, finding an "abscess" in the abdominal wall. She was given antibiotics in the ER and sent home on them. She has not had a fever but did have some chills last night. She still has some issues with swallowing if her food isn't cut up small enough but she is being more careful about it. Does have some shortness of breath and mild pain at the incision site, a 5-6 on a scale of ten which seems to be increasing. Denies drainage from the site. She was dry heaving a lot on the day she went to [place] but now this is better. She feels she can't throw up even if she is feeling nauseous. She has very little appetite.

## 2024-06-21 ENCOUNTER — INPATIENT (INPATIENT)
Facility: HOSPITAL | Age: 54
LOS: 2 days | Discharge: HOME | DRG: 373 | End: 2024-06-24
Attending: THORACIC SURGERY (CARDIOTHORACIC VASCULAR SURGERY) | Admitting: THORACIC SURGERY (CARDIOTHORACIC VASCULAR SURGERY)
Payer: MEDICARE

## 2024-06-21 VITALS
DIASTOLIC BLOOD PRESSURE: 86 MMHG | SYSTOLIC BLOOD PRESSURE: 168 MMHG | HEART RATE: 76 BPM | WEIGHT: 205.03 LBS | OXYGEN SATURATION: 98 % | HEIGHT: 63 IN | RESPIRATION RATE: 20 BRPM | TEMPERATURE: 98 F

## 2024-06-21 DIAGNOSIS — R18.8 OTHER ASCITES: ICD-10-CM

## 2024-06-21 DIAGNOSIS — Z90.49 ACQUIRED ABSENCE OF OTHER SPECIFIED PARTS OF DIGESTIVE TRACT: Chronic | ICD-10-CM

## 2024-06-21 DIAGNOSIS — Z98.890 OTHER SPECIFIED POSTPROCEDURAL STATES: Chronic | ICD-10-CM

## 2024-06-21 DIAGNOSIS — K65.1 PERITONEAL ABSCESS: ICD-10-CM

## 2024-06-21 LAB
ALBUMIN SERPL ELPH-MCNC: 4.1 G/DL — SIGNIFICANT CHANGE UP (ref 3.3–5.2)
ALP SERPL-CCNC: 81 U/L — SIGNIFICANT CHANGE UP (ref 40–120)
ALT FLD-CCNC: 23 U/L — SIGNIFICANT CHANGE UP
ANION GAP SERPL CALC-SCNC: 15 MMOL/L — SIGNIFICANT CHANGE UP (ref 5–17)
APTT BLD: 32 SEC — SIGNIFICANT CHANGE UP (ref 24.5–35.6)
AST SERPL-CCNC: 22 U/L — SIGNIFICANT CHANGE UP
BASOPHILS # BLD AUTO: 0.06 K/UL — SIGNIFICANT CHANGE UP (ref 0–0.2)
BASOPHILS NFR BLD AUTO: 0.8 % — SIGNIFICANT CHANGE UP (ref 0–2)
BILIRUB SERPL-MCNC: <0.2 MG/DL — LOW (ref 0.4–2)
BLD GP AB SCN SERPL QL: SIGNIFICANT CHANGE UP
BUN SERPL-MCNC: 10.3 MG/DL — SIGNIFICANT CHANGE UP (ref 8–20)
CALCIUM SERPL-MCNC: 9.1 MG/DL — SIGNIFICANT CHANGE UP (ref 8.4–10.5)
CHLORIDE SERPL-SCNC: 106 MMOL/L — SIGNIFICANT CHANGE UP (ref 96–108)
CO2 SERPL-SCNC: 22 MMOL/L — SIGNIFICANT CHANGE UP (ref 22–29)
CREAT SERPL-MCNC: 0.8 MG/DL — SIGNIFICANT CHANGE UP (ref 0.5–1.3)
EGFR: 86 ML/MIN/1.73M2 — SIGNIFICANT CHANGE UP
EOSINOPHIL # BLD AUTO: 0.34 K/UL — SIGNIFICANT CHANGE UP (ref 0–0.5)
EOSINOPHIL NFR BLD AUTO: 4.8 % — SIGNIFICANT CHANGE UP (ref 0–6)
GLUCOSE SERPL-MCNC: 98 MG/DL — SIGNIFICANT CHANGE UP (ref 70–99)
HCG SERPL-ACNC: <4 MIU/ML — SIGNIFICANT CHANGE UP
HCT VFR BLD CALC: 40.2 % — SIGNIFICANT CHANGE UP (ref 34.5–45)
HGB BLD-MCNC: 13.4 G/DL — SIGNIFICANT CHANGE UP (ref 11.5–15.5)
IMM GRANULOCYTES NFR BLD AUTO: 0.3 % — SIGNIFICANT CHANGE UP (ref 0–0.9)
INR BLD: 0.98 RATIO — SIGNIFICANT CHANGE UP (ref 0.85–1.18)
LACTATE BLDV-MCNC: 2.4 MMOL/L — HIGH (ref 0.5–2)
LACTATE BLDV-MCNC: 3 MMOL/L — HIGH (ref 0.5–2)
LIDOCAIN IGE QN: 69 U/L — HIGH (ref 22–51)
LYMPHOCYTES # BLD AUTO: 1.72 K/UL — SIGNIFICANT CHANGE UP (ref 1–3.3)
LYMPHOCYTES # BLD AUTO: 24.1 % — SIGNIFICANT CHANGE UP (ref 13–44)
MCHC RBC-ENTMCNC: 29.1 PG — SIGNIFICANT CHANGE UP (ref 27–34)
MCHC RBC-ENTMCNC: 33.3 GM/DL — SIGNIFICANT CHANGE UP (ref 32–36)
MCV RBC AUTO: 87.4 FL — SIGNIFICANT CHANGE UP (ref 80–100)
MONOCYTES # BLD AUTO: 0.48 K/UL — SIGNIFICANT CHANGE UP (ref 0–0.9)
MONOCYTES NFR BLD AUTO: 6.7 % — SIGNIFICANT CHANGE UP (ref 2–14)
NEUTROPHILS # BLD AUTO: 4.51 K/UL — SIGNIFICANT CHANGE UP (ref 1.8–7.4)
NEUTROPHILS NFR BLD AUTO: 63.3 % — SIGNIFICANT CHANGE UP (ref 43–77)
PLATELET # BLD AUTO: 228 K/UL — SIGNIFICANT CHANGE UP (ref 150–400)
POTASSIUM SERPL-MCNC: 4.5 MMOL/L — SIGNIFICANT CHANGE UP (ref 3.5–5.3)
POTASSIUM SERPL-SCNC: 4.5 MMOL/L — SIGNIFICANT CHANGE UP (ref 3.5–5.3)
PROT SERPL-MCNC: 6.8 G/DL — SIGNIFICANT CHANGE UP (ref 6.6–8.7)
PROTHROM AB SERPL-ACNC: 10.9 SEC — SIGNIFICANT CHANGE UP (ref 9.5–13)
RBC # BLD: 4.6 M/UL — SIGNIFICANT CHANGE UP (ref 3.8–5.2)
RBC # FLD: 12.9 % — SIGNIFICANT CHANGE UP (ref 10.3–14.5)
SODIUM SERPL-SCNC: 143 MMOL/L — SIGNIFICANT CHANGE UP (ref 135–145)
WBC # BLD: 7.13 K/UL — SIGNIFICANT CHANGE UP (ref 3.8–10.5)
WBC # FLD AUTO: 7.13 K/UL — SIGNIFICANT CHANGE UP (ref 3.8–10.5)

## 2024-06-21 PROCEDURE — 71260 CT THORAX DX C+: CPT | Mod: 26,MC

## 2024-06-21 PROCEDURE — 74177 CT ABD & PELVIS W/CONTRAST: CPT | Mod: 26,MC

## 2024-06-21 PROCEDURE — 93010 ELECTROCARDIOGRAM REPORT: CPT

## 2024-06-21 PROCEDURE — 99285 EMERGENCY DEPT VISIT HI MDM: CPT | Mod: FS

## 2024-06-21 RX ORDER — ACETAMINOPHEN 325 MG
650 TABLET ORAL EVERY 6 HOURS
Refills: 0 | Status: DISCONTINUED | OUTPATIENT
Start: 2024-06-21 | End: 2024-06-24

## 2024-06-21 RX ORDER — ACETAMINOPHEN 325 MG
1000 TABLET ORAL ONCE
Refills: 0 | Status: COMPLETED | OUTPATIENT
Start: 2024-06-21 | End: 2024-06-21

## 2024-06-21 RX ORDER — FAMOTIDINE 40 MG
20 TABLET ORAL ONCE
Refills: 0 | Status: COMPLETED | OUTPATIENT
Start: 2024-06-21 | End: 2024-06-21

## 2024-06-21 RX ORDER — DEXTROSE MONOHYDRATE AND SODIUM CHLORIDE 5; .3 G/100ML; G/100ML
1000 INJECTION, SOLUTION INTRAVENOUS ONCE
Refills: 0 | Status: COMPLETED | OUTPATIENT
Start: 2024-06-21 | End: 2024-06-21

## 2024-06-21 RX ORDER — METRONIDAZOLE 500 MG/1
500 TABLET ORAL ONCE
Refills: 0 | Status: COMPLETED | OUTPATIENT
Start: 2024-06-21 | End: 2024-06-21

## 2024-06-21 RX ORDER — HEPARIN SODIUM 50 [USP'U]/ML
5000 INJECTION, SOLUTION INTRAVENOUS EVERY 8 HOURS
Refills: 0 | Status: DISCONTINUED | OUTPATIENT
Start: 2024-06-21 | End: 2024-06-24

## 2024-06-21 RX ORDER — ATORVASTATIN CALCIUM 20 MG/1
40 TABLET, FILM COATED ORAL AT BEDTIME
Refills: 0 | Status: DISCONTINUED | OUTPATIENT
Start: 2024-06-21 | End: 2024-06-24

## 2024-06-21 RX ORDER — AMLODIPINE BESYLATE 2.5 MG/1
10 TABLET ORAL DAILY
Refills: 0 | Status: DISCONTINUED | OUTPATIENT
Start: 2024-06-21 | End: 2024-06-24

## 2024-06-21 RX ORDER — FLUVOXAMINE MALEATE 50 MG/1
100 TABLET ORAL ONCE
Refills: 0 | Status: DISCONTINUED | OUTPATIENT
Start: 2024-06-21 | End: 2024-06-21

## 2024-06-21 RX ORDER — TIGECYCLINE 50 MG/5ML
100 INJECTION, POWDER, LYOPHILIZED, FOR SOLUTION INTRAVENOUS ONCE
Refills: 0 | Status: DISCONTINUED | OUTPATIENT
Start: 2024-06-21 | End: 2024-06-21

## 2024-06-21 RX ORDER — ARIPIPRAZOLE 15 MG/1
2 TABLET ORAL AT BEDTIME
Refills: 0 | Status: DISCONTINUED | OUTPATIENT
Start: 2024-06-21 | End: 2024-06-22

## 2024-06-21 RX ORDER — MORPHINE SULFATE 100 MG/1
4 TABLET, EXTENDED RELEASE ORAL ONCE
Refills: 0 | Status: DISCONTINUED | OUTPATIENT
Start: 2024-06-21 | End: 2024-06-21

## 2024-06-21 RX ORDER — GABAPENTIN
300 POWDER (GRAM) MISCELLANEOUS
Refills: 0 | Status: DISCONTINUED | OUTPATIENT
Start: 2024-06-21 | End: 2024-06-24

## 2024-06-21 RX ORDER — LEVOTHYROXINE SODIUM 25 MCG
150 TABLET ORAL DAILY
Refills: 0 | Status: DISCONTINUED | OUTPATIENT
Start: 2024-06-21 | End: 2024-06-22

## 2024-06-21 RX ORDER — AZTREONAM 2 G
2000 VIAL (EA) INJECTION ONCE
Refills: 0 | Status: COMPLETED | OUTPATIENT
Start: 2024-06-21 | End: 2024-06-21

## 2024-06-21 RX ORDER — PANTOPRAZOLE SODIUM 40 MG/10ML
40 INJECTION, POWDER, FOR SOLUTION INTRAVENOUS
Refills: 0 | Status: DISCONTINUED | OUTPATIENT
Start: 2024-06-21 | End: 2024-06-24

## 2024-06-21 RX ORDER — SODIUM CHLORIDE 0.9 % (FLUSH) 0.9 %
3 SYRINGE (ML) INJECTION EVERY 8 HOURS
Refills: 0 | Status: DISCONTINUED | OUTPATIENT
Start: 2024-06-21 | End: 2024-06-24

## 2024-06-21 RX ORDER — ONDANSETRON HYDROCHLORIDE 2 MG/ML
4 INJECTION INTRAMUSCULAR; INTRAVENOUS ONCE
Refills: 0 | Status: COMPLETED | OUTPATIENT
Start: 2024-06-21 | End: 2024-06-21

## 2024-06-21 RX ORDER — FLUVOXAMINE MALEATE 50 MG/1
200 TABLET ORAL DAILY
Refills: 0 | Status: DISCONTINUED | OUTPATIENT
Start: 2024-06-21 | End: 2024-06-24

## 2024-06-21 RX ORDER — VANCOMYCIN HYDROCHLORIDE 50 MG/ML
1500 KIT ORAL ONCE
Refills: 0 | Status: COMPLETED | OUTPATIENT
Start: 2024-06-21 | End: 2024-06-21

## 2024-06-21 RX ORDER — SODIUM CHLORIDE 0.9 % (FLUSH) 0.9 %
1000 SYRINGE (ML) INJECTION
Refills: 0 | Status: DISCONTINUED | OUTPATIENT
Start: 2024-06-21 | End: 2024-06-22

## 2024-06-21 RX ORDER — SODIUM CHLORIDE 0.9 % (FLUSH) 0.9 %
1000 SYRINGE (ML) INJECTION ONCE
Refills: 0 | Status: DISCONTINUED | OUTPATIENT
Start: 2024-06-21 | End: 2024-06-21

## 2024-06-21 RX ORDER — LOSARTAN POTASSIUM 100 MG/1
25 TABLET, FILM COATED ORAL DAILY
Refills: 0 | Status: DISCONTINUED | OUTPATIENT
Start: 2024-06-21 | End: 2024-06-24

## 2024-06-21 RX ADMIN — METRONIDAZOLE 100 MILLIGRAM(S): 500 TABLET ORAL at 17:51

## 2024-06-21 RX ADMIN — HEPARIN SODIUM 5000 UNIT(S): 50 INJECTION, SOLUTION INTRAVENOUS at 22:58

## 2024-06-21 RX ADMIN — ARIPIPRAZOLE 2 MILLIGRAM(S): 15 TABLET ORAL at 22:58

## 2024-06-21 RX ADMIN — ONDANSETRON HYDROCHLORIDE 4 MILLIGRAM(S): 2 INJECTION INTRAMUSCULAR; INTRAVENOUS at 16:36

## 2024-06-21 RX ADMIN — Medication 20 MILLIGRAM(S): at 16:36

## 2024-06-21 RX ADMIN — DEXTROSE MONOHYDRATE AND SODIUM CHLORIDE 1000 MILLILITER(S): 5; .3 INJECTION, SOLUTION INTRAVENOUS at 19:55

## 2024-06-21 RX ADMIN — MORPHINE SULFATE 4 MILLIGRAM(S): 100 TABLET, EXTENDED RELEASE ORAL at 20:05

## 2024-06-21 RX ADMIN — VANCOMYCIN HYDROCHLORIDE 300 MILLIGRAM(S): KIT at 22:57

## 2024-06-21 RX ADMIN — DEXTROSE MONOHYDRATE AND SODIUM CHLORIDE 1000 MILLILITER(S): 5; .3 INJECTION, SOLUTION INTRAVENOUS at 16:36

## 2024-06-21 RX ADMIN — ATORVASTATIN CALCIUM 40 MILLIGRAM(S): 20 TABLET, FILM COATED ORAL at 22:58

## 2024-06-21 RX ADMIN — METRONIDAZOLE 100 MILLIGRAM(S): 500 TABLET ORAL at 22:57

## 2024-06-21 RX ADMIN — Medication 100 MILLIGRAM(S): at 22:57

## 2024-06-21 RX ADMIN — Medication 400 MILLIGRAM(S): at 18:38

## 2024-06-21 RX ADMIN — DEXTROSE MONOHYDRATE AND SODIUM CHLORIDE 1000 MILLILITER(S): 5; .3 INJECTION, SOLUTION INTRAVENOUS at 17:51

## 2024-06-22 LAB
A1C WITH ESTIMATED AVERAGE GLUCOSE RESULT: 6.2 % — HIGH (ref 4–5.6)
ALBUMIN SERPL ELPH-MCNC: 3.8 G/DL — SIGNIFICANT CHANGE UP (ref 3.3–5.2)
ALP SERPL-CCNC: 146 U/L — HIGH (ref 40–120)
ALT FLD-CCNC: 52 U/L — HIGH
ANION GAP SERPL CALC-SCNC: 11 MMOL/L — SIGNIFICANT CHANGE UP (ref 5–17)
APPEARANCE UR: CLEAR — SIGNIFICANT CHANGE UP
APTT BLD: 30.2 SEC — SIGNIFICANT CHANGE UP (ref 24.5–35.6)
AST SERPL-CCNC: 71 U/L — HIGH
BACTERIA # UR AUTO: NEGATIVE /HPF — SIGNIFICANT CHANGE UP
BASOPHILS # BLD AUTO: 0.04 K/UL — SIGNIFICANT CHANGE UP (ref 0–0.2)
BASOPHILS NFR BLD AUTO: 0.8 % — SIGNIFICANT CHANGE UP (ref 0–2)
BILIRUB SERPL-MCNC: 0.3 MG/DL — LOW (ref 0.4–2)
BILIRUB UR-MCNC: NEGATIVE — SIGNIFICANT CHANGE UP
BUN SERPL-MCNC: 9.1 MG/DL — SIGNIFICANT CHANGE UP (ref 8–20)
CALCIUM SERPL-MCNC: 8.7 MG/DL — SIGNIFICANT CHANGE UP (ref 8.4–10.5)
CAST: 1 /LPF — SIGNIFICANT CHANGE UP (ref 0–4)
CHLORIDE SERPL-SCNC: 104 MMOL/L — SIGNIFICANT CHANGE UP (ref 96–108)
CO2 SERPL-SCNC: 25 MMOL/L — SIGNIFICANT CHANGE UP (ref 22–29)
COLOR SPEC: YELLOW — SIGNIFICANT CHANGE UP
CREAT SERPL-MCNC: 0.91 MG/DL — SIGNIFICANT CHANGE UP (ref 0.5–1.3)
DIFF PNL FLD: NEGATIVE — SIGNIFICANT CHANGE UP
EGFR: 74 ML/MIN/1.73M2 — SIGNIFICANT CHANGE UP
EOSINOPHIL # BLD AUTO: 0.24 K/UL — SIGNIFICANT CHANGE UP (ref 0–0.5)
EOSINOPHIL NFR BLD AUTO: 4.6 % — SIGNIFICANT CHANGE UP (ref 0–6)
ESTIMATED AVERAGE GLUCOSE: 131 MG/DL — HIGH (ref 68–114)
GLUCOSE BLDC GLUCOMTR-MCNC: 105 MG/DL — HIGH (ref 70–99)
GLUCOSE BLDC GLUCOMTR-MCNC: 115 MG/DL — HIGH (ref 70–99)
GLUCOSE BLDC GLUCOMTR-MCNC: 133 MG/DL — HIGH (ref 70–99)
GLUCOSE SERPL-MCNC: 102 MG/DL — HIGH (ref 70–99)
GLUCOSE UR QL: NEGATIVE MG/DL — SIGNIFICANT CHANGE UP
HCG SERPL-ACNC: <4 MIU/ML — SIGNIFICANT CHANGE UP
HCT VFR BLD CALC: 36.9 % — SIGNIFICANT CHANGE UP (ref 34.5–45)
HGB BLD-MCNC: 12.2 G/DL — SIGNIFICANT CHANGE UP (ref 11.5–15.5)
IMM GRANULOCYTES NFR BLD AUTO: 0.2 % — SIGNIFICANT CHANGE UP (ref 0–0.9)
INR BLD: 1.03 RATIO — SIGNIFICANT CHANGE UP (ref 0.85–1.18)
KETONES UR-MCNC: NEGATIVE MG/DL — SIGNIFICANT CHANGE UP
LACTATE SERPL-SCNC: 1.3 MMOL/L — SIGNIFICANT CHANGE UP (ref 0.5–2)
LEUKOCYTE ESTERASE UR-ACNC: NEGATIVE — SIGNIFICANT CHANGE UP
LYMPHOCYTES # BLD AUTO: 1.66 K/UL — SIGNIFICANT CHANGE UP (ref 1–3.3)
LYMPHOCYTES # BLD AUTO: 31.5 % — SIGNIFICANT CHANGE UP (ref 13–44)
MCHC RBC-ENTMCNC: 28.4 PG — SIGNIFICANT CHANGE UP (ref 27–34)
MCHC RBC-ENTMCNC: 33.1 GM/DL — SIGNIFICANT CHANGE UP (ref 32–36)
MCV RBC AUTO: 86 FL — SIGNIFICANT CHANGE UP (ref 80–100)
MONOCYTES # BLD AUTO: 0.4 K/UL — SIGNIFICANT CHANGE UP (ref 0–0.9)
MONOCYTES NFR BLD AUTO: 7.6 % — SIGNIFICANT CHANGE UP (ref 2–14)
MRSA PCR RESULT.: SIGNIFICANT CHANGE UP
NEUTROPHILS # BLD AUTO: 2.92 K/UL — SIGNIFICANT CHANGE UP (ref 1.8–7.4)
NEUTROPHILS NFR BLD AUTO: 55.3 % — SIGNIFICANT CHANGE UP (ref 43–77)
NITRITE UR-MCNC: NEGATIVE — SIGNIFICANT CHANGE UP
NT-PROBNP SERPL-SCNC: 118 PG/ML — SIGNIFICANT CHANGE UP (ref 0–300)
PH UR: 6 — SIGNIFICANT CHANGE UP (ref 5–8)
PLATELET # BLD AUTO: 196 K/UL — SIGNIFICANT CHANGE UP (ref 150–400)
POTASSIUM SERPL-MCNC: 4.1 MMOL/L — SIGNIFICANT CHANGE UP (ref 3.5–5.3)
POTASSIUM SERPL-SCNC: 4.1 MMOL/L — SIGNIFICANT CHANGE UP (ref 3.5–5.3)
PREALB SERPL-MCNC: 24 MG/DL — SIGNIFICANT CHANGE UP (ref 18–38)
PROCALCITONIN SERPL-MCNC: 0.06 NG/ML — SIGNIFICANT CHANGE UP (ref 0.02–0.1)
PROCALCITONIN SERPL-MCNC: 0.06 NG/ML — SIGNIFICANT CHANGE UP (ref 0.02–0.1)
PROT SERPL-MCNC: 6.3 G/DL — LOW (ref 6.6–8.7)
PROT UR-MCNC: NEGATIVE MG/DL — SIGNIFICANT CHANGE UP
PROTHROM AB SERPL-ACNC: 11.4 SEC — SIGNIFICANT CHANGE UP (ref 9.5–13)
RBC # BLD: 4.29 M/UL — SIGNIFICANT CHANGE UP (ref 3.8–5.2)
RBC # FLD: 12.6 % — SIGNIFICANT CHANGE UP (ref 10.3–14.5)
RBC CASTS # UR COMP ASSIST: 0 /HPF — SIGNIFICANT CHANGE UP (ref 0–4)
S AUREUS DNA NOSE QL NAA+PROBE: SIGNIFICANT CHANGE UP
SODIUM SERPL-SCNC: 140 MMOL/L — SIGNIFICANT CHANGE UP (ref 135–145)
SP GR SPEC: 1.02 — SIGNIFICANT CHANGE UP (ref 1–1.03)
SQUAMOUS # UR AUTO: 1 /HPF — SIGNIFICANT CHANGE UP (ref 0–5)
T3 SERPL-MCNC: 101 NG/DL — SIGNIFICANT CHANGE UP (ref 80–200)
T4 AB SER-ACNC: 7 UG/DL — SIGNIFICANT CHANGE UP (ref 4.5–12)
T4 FREE SERPL-MCNC: 1.2 NG/DL — SIGNIFICANT CHANGE UP (ref 0.9–1.8)
TSH SERPL-MCNC: 10.81 UIU/ML — HIGH (ref 0.27–4.2)
UROBILINOGEN FLD QL: 0.2 MG/DL — SIGNIFICANT CHANGE UP (ref 0.2–1)
WBC # BLD: 5.27 K/UL — SIGNIFICANT CHANGE UP (ref 3.8–10.5)
WBC # FLD AUTO: 5.27 K/UL — SIGNIFICANT CHANGE UP (ref 3.8–10.5)
WBC UR QL: 0 /HPF — SIGNIFICANT CHANGE UP (ref 0–5)

## 2024-06-22 PROCEDURE — 99024 POSTOP FOLLOW-UP VISIT: CPT

## 2024-06-22 PROCEDURE — 99223 1ST HOSP IP/OBS HIGH 75: CPT

## 2024-06-22 PROCEDURE — 93010 ELECTROCARDIOGRAM REPORT: CPT

## 2024-06-22 RX ORDER — LEVOTHYROXINE SODIUM 25 MCG
120 TABLET ORAL AT BEDTIME
Refills: 0 | Status: DISCONTINUED | OUTPATIENT
Start: 2024-06-22 | End: 2024-06-24

## 2024-06-22 RX ORDER — METRONIDAZOLE 500 MG/1
TABLET ORAL
Refills: 0 | Status: DISCONTINUED | OUTPATIENT
Start: 2024-06-22 | End: 2024-06-22

## 2024-06-22 RX ORDER — ACETAMINOPHEN 325 MG
1000 TABLET ORAL ONCE
Refills: 0 | Status: COMPLETED | OUTPATIENT
Start: 2024-06-22 | End: 2024-06-22

## 2024-06-22 RX ORDER — DEXTROSE 30 % IN WATER 30 %
15 VIAL (ML) INTRAVENOUS ONCE
Refills: 0 | Status: DISCONTINUED | OUTPATIENT
Start: 2024-06-22 | End: 2024-06-24

## 2024-06-22 RX ORDER — SACCHAROMYCES BOULARDII 50 MG
250 CAPSULE ORAL
Refills: 0 | Status: DISCONTINUED | OUTPATIENT
Start: 2024-06-22 | End: 2024-06-24

## 2024-06-22 RX ORDER — DEXTROSE 30 % IN WATER 30 %
25 VIAL (ML) INTRAVENOUS ONCE
Refills: 0 | Status: DISCONTINUED | OUTPATIENT
Start: 2024-06-22 | End: 2024-06-24

## 2024-06-22 RX ORDER — GLUCAGON HYDROCHLORIDE 1 MG/ML
1 INJECTION, POWDER, FOR SOLUTION INTRAMUSCULAR; INTRAVENOUS; SUBCUTANEOUS ONCE
Refills: 0 | Status: DISCONTINUED | OUTPATIENT
Start: 2024-06-22 | End: 2024-06-24

## 2024-06-22 RX ORDER — METRONIDAZOLE 500 MG/1
500 TABLET ORAL ONCE
Refills: 0 | Status: COMPLETED | OUTPATIENT
Start: 2024-06-22 | End: 2024-06-22

## 2024-06-22 RX ORDER — AZTREONAM 2 G
2000 VIAL (EA) INJECTION EVERY 8 HOURS
Refills: 0 | Status: DISCONTINUED | OUTPATIENT
Start: 2024-06-22 | End: 2024-06-22

## 2024-06-22 RX ORDER — INSULIN LISPRO 100 [IU]/ML
INJECTION, SOLUTION SUBCUTANEOUS
Refills: 0 | Status: DISCONTINUED | OUTPATIENT
Start: 2024-06-22 | End: 2024-06-24

## 2024-06-22 RX ORDER — METRONIDAZOLE 500 MG/1
500 TABLET ORAL EVERY 8 HOURS
Refills: 0 | Status: DISCONTINUED | OUTPATIENT
Start: 2024-06-22 | End: 2024-06-22

## 2024-06-22 RX ORDER — ONDANSETRON HYDROCHLORIDE 2 MG/ML
4 INJECTION INTRAMUSCULAR; INTRAVENOUS EVERY 8 HOURS
Refills: 0 | Status: DISCONTINUED | OUTPATIENT
Start: 2024-06-22 | End: 2024-06-24

## 2024-06-22 RX ORDER — MEROPENEM 500 MG/1
1000 INJECTION, POWDER, FOR SOLUTION INTRAVENOUS EVERY 8 HOURS
Refills: 0 | Status: DISCONTINUED | OUTPATIENT
Start: 2024-06-22 | End: 2024-06-22

## 2024-06-22 RX ORDER — DEXTROSE 30 % IN WATER 30 %
12.5 VIAL (ML) INTRAVENOUS ONCE
Refills: 0 | Status: DISCONTINUED | OUTPATIENT
Start: 2024-06-22 | End: 2024-06-24

## 2024-06-22 RX ORDER — LEVOTHYROXINE SODIUM 25 MCG
150 TABLET ORAL DAILY
Refills: 0 | Status: DISCONTINUED | OUTPATIENT
Start: 2024-06-22 | End: 2024-06-22

## 2024-06-22 RX ORDER — METOCLOPRAMIDE 5 MG/5ML
10 SOLUTION ORAL EVERY 8 HOURS
Refills: 0 | Status: COMPLETED | OUTPATIENT
Start: 2024-06-22 | End: 2024-06-23

## 2024-06-22 RX ORDER — ONDANSETRON HYDROCHLORIDE 2 MG/ML
4 INJECTION INTRAMUSCULAR; INTRAVENOUS ONCE
Refills: 0 | Status: COMPLETED | OUTPATIENT
Start: 2024-06-22 | End: 2024-06-22

## 2024-06-22 RX ORDER — VANCOMYCIN HYDROCHLORIDE 50 MG/ML
1500 KIT ORAL EVERY 12 HOURS
Refills: 0 | Status: DISCONTINUED | OUTPATIENT
Start: 2024-06-22 | End: 2024-06-22

## 2024-06-22 RX ORDER — MEROPENEM 500 MG/1
1000 INJECTION, POWDER, FOR SOLUTION INTRAVENOUS EVERY 8 HOURS
Refills: 0 | Status: DISCONTINUED | OUTPATIENT
Start: 2024-06-22 | End: 2024-06-24

## 2024-06-22 RX ADMIN — MEROPENEM 1000 MILLIGRAM(S): 500 INJECTION, POWDER, FOR SOLUTION INTRAVENOUS at 21:24

## 2024-06-22 RX ADMIN — Medication 650 MILLIGRAM(S): at 12:08

## 2024-06-22 RX ADMIN — MEROPENEM 1000 MILLIGRAM(S): 500 INJECTION, POWDER, FOR SOLUTION INTRAVENOUS at 12:59

## 2024-06-22 RX ADMIN — HEPARIN SODIUM 5000 UNIT(S): 50 INJECTION, SOLUTION INTRAVENOUS at 21:24

## 2024-06-22 RX ADMIN — LOSARTAN POTASSIUM 25 MILLIGRAM(S): 100 TABLET, FILM COATED ORAL at 05:32

## 2024-06-22 RX ADMIN — Medication 3 MILLILITER(S): at 12:33

## 2024-06-22 RX ADMIN — Medication 1000 MILLIGRAM(S): at 07:30

## 2024-06-22 RX ADMIN — ONDANSETRON HYDROCHLORIDE 4 MILLIGRAM(S): 2 INJECTION INTRAMUSCULAR; INTRAVENOUS at 06:30

## 2024-06-22 RX ADMIN — Medication 3 MILLILITER(S): at 21:25

## 2024-06-22 RX ADMIN — Medication 80 MILLILITER(S): at 05:47

## 2024-06-22 RX ADMIN — METRONIDAZOLE 100 MILLIGRAM(S): 500 TABLET ORAL at 09:05

## 2024-06-22 RX ADMIN — Medication 300 MILLIGRAM(S): at 05:32

## 2024-06-22 RX ADMIN — HEPARIN SODIUM 5000 UNIT(S): 50 INJECTION, SOLUTION INTRAVENOUS at 13:15

## 2024-06-22 RX ADMIN — Medication 150 MICROGRAM(S): at 06:31

## 2024-06-22 RX ADMIN — AMLODIPINE BESYLATE 10 MILLIGRAM(S): 2.5 TABLET ORAL at 05:32

## 2024-06-22 RX ADMIN — Medication 3 MILLILITER(S): at 05:46

## 2024-06-22 RX ADMIN — Medication 650 MILLIGRAM(S): at 12:33

## 2024-06-22 RX ADMIN — VANCOMYCIN HYDROCHLORIDE 300 MILLIGRAM(S): KIT at 12:08

## 2024-06-22 RX ADMIN — Medication 120 MICROGRAM(S): at 21:25

## 2024-06-22 RX ADMIN — Medication 250 MILLIGRAM(S): at 17:09

## 2024-06-22 RX ADMIN — Medication 650 MILLIGRAM(S): at 21:35

## 2024-06-22 RX ADMIN — METOCLOPRAMIDE 10 MILLIGRAM(S): 5 SOLUTION ORAL at 21:24

## 2024-06-22 RX ADMIN — Medication 650 MILLIGRAM(S): at 22:35

## 2024-06-22 RX ADMIN — HEPARIN SODIUM 5000 UNIT(S): 50 INJECTION, SOLUTION INTRAVENOUS at 05:32

## 2024-06-22 RX ADMIN — Medication 300 MILLIGRAM(S): at 17:09

## 2024-06-22 RX ADMIN — PANTOPRAZOLE SODIUM 40 MILLIGRAM(S): 40 INJECTION, POWDER, FOR SOLUTION INTRAVENOUS at 05:32

## 2024-06-22 RX ADMIN — ATORVASTATIN CALCIUM 40 MILLIGRAM(S): 20 TABLET, FILM COATED ORAL at 21:25

## 2024-06-22 RX ADMIN — Medication 400 MILLIGRAM(S): at 06:30

## 2024-06-22 RX ADMIN — FLUVOXAMINE MALEATE 200 MILLIGRAM(S): 50 TABLET ORAL at 12:09

## 2024-06-23 LAB
ANION GAP SERPL CALC-SCNC: 13 MMOL/L — SIGNIFICANT CHANGE UP (ref 5–17)
BUN SERPL-MCNC: 6.3 MG/DL — LOW (ref 8–20)
CALCIUM SERPL-MCNC: 8.9 MG/DL — SIGNIFICANT CHANGE UP (ref 8.4–10.5)
CHLORIDE SERPL-SCNC: 103 MMOL/L — SIGNIFICANT CHANGE UP (ref 96–108)
CO2 SERPL-SCNC: 26 MMOL/L — SIGNIFICANT CHANGE UP (ref 22–29)
CREAT SERPL-MCNC: 0.92 MG/DL — SIGNIFICANT CHANGE UP (ref 0.5–1.3)
CULTURE RESULTS: NO GROWTH — SIGNIFICANT CHANGE UP
EGFR: 73 ML/MIN/1.73M2 — SIGNIFICANT CHANGE UP
GLUCOSE BLDC GLUCOMTR-MCNC: 104 MG/DL — HIGH (ref 70–99)
GLUCOSE BLDC GLUCOMTR-MCNC: 109 MG/DL — HIGH (ref 70–99)
GLUCOSE BLDC GLUCOMTR-MCNC: 109 MG/DL — HIGH (ref 70–99)
GLUCOSE BLDC GLUCOMTR-MCNC: 131 MG/DL — HIGH (ref 70–99)
GLUCOSE SERPL-MCNC: 108 MG/DL — HIGH (ref 70–99)
HCT VFR BLD CALC: 38 % — SIGNIFICANT CHANGE UP (ref 34.5–45)
HGB BLD-MCNC: 13.1 G/DL — SIGNIFICANT CHANGE UP (ref 11.5–15.5)
MAGNESIUM SERPL-MCNC: 1.7 MG/DL — SIGNIFICANT CHANGE UP (ref 1.6–2.6)
MCHC RBC-ENTMCNC: 29.2 PG — SIGNIFICANT CHANGE UP (ref 27–34)
MCHC RBC-ENTMCNC: 34.5 GM/DL — SIGNIFICANT CHANGE UP (ref 32–36)
MCV RBC AUTO: 84.6 FL — SIGNIFICANT CHANGE UP (ref 80–100)
PLATELET # BLD AUTO: 202 K/UL — SIGNIFICANT CHANGE UP (ref 150–400)
POTASSIUM SERPL-MCNC: 3.8 MMOL/L — SIGNIFICANT CHANGE UP (ref 3.5–5.3)
POTASSIUM SERPL-SCNC: 3.8 MMOL/L — SIGNIFICANT CHANGE UP (ref 3.5–5.3)
RBC # BLD: 4.49 M/UL — SIGNIFICANT CHANGE UP (ref 3.8–5.2)
RBC # FLD: 12.5 % — SIGNIFICANT CHANGE UP (ref 10.3–14.5)
SODIUM SERPL-SCNC: 142 MMOL/L — SIGNIFICANT CHANGE UP (ref 135–145)
SPECIMEN SOURCE: SIGNIFICANT CHANGE UP
VANCOMYCIN TROUGH SERPL-MCNC: 4.3 UG/ML — LOW (ref 10–20)
WBC # BLD: 4.42 K/UL — SIGNIFICANT CHANGE UP (ref 3.8–10.5)
WBC # FLD AUTO: 4.42 K/UL — SIGNIFICANT CHANGE UP (ref 3.8–10.5)

## 2024-06-23 PROCEDURE — 99024 POSTOP FOLLOW-UP VISIT: CPT

## 2024-06-23 RX ORDER — POTASSIUM CHLORIDE 600 MG/1
40 TABLET, FILM COATED, EXTENDED RELEASE ORAL ONCE
Refills: 0 | Status: COMPLETED | OUTPATIENT
Start: 2024-06-23 | End: 2024-06-23

## 2024-06-23 RX ORDER — MAGNESIUM SULFATE 100 %
2 POWDER (GRAM) MISCELLANEOUS ONCE
Refills: 0 | Status: COMPLETED | OUTPATIENT
Start: 2024-06-23 | End: 2024-06-23

## 2024-06-23 RX ADMIN — Medication 250 MILLIGRAM(S): at 05:29

## 2024-06-23 RX ADMIN — METOCLOPRAMIDE 10 MILLIGRAM(S): 5 SOLUTION ORAL at 05:28

## 2024-06-23 RX ADMIN — LOSARTAN POTASSIUM 25 MILLIGRAM(S): 100 TABLET, FILM COATED ORAL at 05:29

## 2024-06-23 RX ADMIN — HEPARIN SODIUM 5000 UNIT(S): 50 INJECTION, SOLUTION INTRAVENOUS at 13:48

## 2024-06-23 RX ADMIN — Medication 300 MILLIGRAM(S): at 17:10

## 2024-06-23 RX ADMIN — AMLODIPINE BESYLATE 10 MILLIGRAM(S): 2.5 TABLET ORAL at 05:29

## 2024-06-23 RX ADMIN — FLUVOXAMINE MALEATE 200 MILLIGRAM(S): 50 TABLET ORAL at 12:47

## 2024-06-23 RX ADMIN — Medication 25 GRAM(S): at 08:32

## 2024-06-23 RX ADMIN — ATORVASTATIN CALCIUM 40 MILLIGRAM(S): 20 TABLET, FILM COATED ORAL at 21:46

## 2024-06-23 RX ADMIN — HEPARIN SODIUM 5000 UNIT(S): 50 INJECTION, SOLUTION INTRAVENOUS at 21:47

## 2024-06-23 RX ADMIN — Medication 300 MILLIGRAM(S): at 05:29

## 2024-06-23 RX ADMIN — Medication 120 MICROGRAM(S): at 21:46

## 2024-06-23 RX ADMIN — Medication 250 MILLIGRAM(S): at 17:10

## 2024-06-23 RX ADMIN — PANTOPRAZOLE SODIUM 40 MILLIGRAM(S): 40 INJECTION, POWDER, FOR SOLUTION INTRAVENOUS at 05:29

## 2024-06-23 RX ADMIN — HEPARIN SODIUM 5000 UNIT(S): 50 INJECTION, SOLUTION INTRAVENOUS at 05:29

## 2024-06-23 RX ADMIN — Medication 3 MILLILITER(S): at 05:29

## 2024-06-23 RX ADMIN — Medication 3 MILLILITER(S): at 12:40

## 2024-06-23 RX ADMIN — MEROPENEM 1000 MILLIGRAM(S): 500 INJECTION, POWDER, FOR SOLUTION INTRAVENOUS at 21:46

## 2024-06-23 RX ADMIN — POTASSIUM CHLORIDE 40 MILLIEQUIVALENT(S): 600 TABLET, FILM COATED, EXTENDED RELEASE ORAL at 21:46

## 2024-06-23 RX ADMIN — MEROPENEM 1000 MILLIGRAM(S): 500 INJECTION, POWDER, FOR SOLUTION INTRAVENOUS at 13:48

## 2024-06-23 RX ADMIN — METOCLOPRAMIDE 10 MILLIGRAM(S): 5 SOLUTION ORAL at 13:47

## 2024-06-23 RX ADMIN — MEROPENEM 1000 MILLIGRAM(S): 500 INJECTION, POWDER, FOR SOLUTION INTRAVENOUS at 05:28

## 2024-06-23 RX ADMIN — Medication 3 MILLILITER(S): at 22:30

## 2024-06-24 ENCOUNTER — TRANSCRIPTION ENCOUNTER (OUTPATIENT)
Age: 54
End: 2024-06-24

## 2024-06-24 VITALS
DIASTOLIC BLOOD PRESSURE: 88 MMHG | RESPIRATION RATE: 18 BRPM | HEART RATE: 82 BPM | OXYGEN SATURATION: 97 % | TEMPERATURE: 98 F | SYSTOLIC BLOOD PRESSURE: 131 MMHG

## 2024-06-24 LAB
ANION GAP SERPL CALC-SCNC: 12 MMOL/L — SIGNIFICANT CHANGE UP (ref 5–17)
BUN SERPL-MCNC: 7.6 MG/DL — LOW (ref 8–20)
CALCIUM SERPL-MCNC: 9.6 MG/DL — SIGNIFICANT CHANGE UP (ref 8.4–10.5)
CHLORIDE SERPL-SCNC: 107 MMOL/L — SIGNIFICANT CHANGE UP (ref 96–108)
CO2 SERPL-SCNC: 27 MMOL/L — SIGNIFICANT CHANGE UP (ref 22–29)
CREAT SERPL-MCNC: 0.91 MG/DL — SIGNIFICANT CHANGE UP (ref 0.5–1.3)
EGFR: 74 ML/MIN/1.73M2 — SIGNIFICANT CHANGE UP
GLUCOSE BLDC GLUCOMTR-MCNC: 110 MG/DL — HIGH (ref 70–99)
GLUCOSE BLDC GLUCOMTR-MCNC: 110 MG/DL — HIGH (ref 70–99)
GLUCOSE SERPL-MCNC: 109 MG/DL — HIGH (ref 70–99)
HCT VFR BLD CALC: 43.9 % — SIGNIFICANT CHANGE UP (ref 34.5–45)
HGB BLD-MCNC: 15.2 G/DL — SIGNIFICANT CHANGE UP (ref 11.5–15.5)
MAGNESIUM SERPL-MCNC: 2 MG/DL — SIGNIFICANT CHANGE UP (ref 1.6–2.6)
MCHC RBC-ENTMCNC: 29.5 PG — SIGNIFICANT CHANGE UP (ref 27–34)
MCHC RBC-ENTMCNC: 34.6 GM/DL — SIGNIFICANT CHANGE UP (ref 32–36)
MCV RBC AUTO: 85.1 FL — SIGNIFICANT CHANGE UP (ref 80–100)
PLATELET # BLD AUTO: 231 K/UL — SIGNIFICANT CHANGE UP (ref 150–400)
POTASSIUM SERPL-MCNC: 4.5 MMOL/L — SIGNIFICANT CHANGE UP (ref 3.5–5.3)
POTASSIUM SERPL-SCNC: 4.5 MMOL/L — SIGNIFICANT CHANGE UP (ref 3.5–5.3)
RBC # BLD: 5.16 M/UL — SIGNIFICANT CHANGE UP (ref 3.8–5.2)
RBC # FLD: 12.7 % — SIGNIFICANT CHANGE UP (ref 10.3–14.5)
SODIUM SERPL-SCNC: 146 MMOL/L — HIGH (ref 135–145)
T4 AB SER-ACNC: 10.3 UG/DL — SIGNIFICANT CHANGE UP (ref 4.5–12)
T4 FREE SERPL-MCNC: 1.6 NG/DL — SIGNIFICANT CHANGE UP (ref 0.9–1.8)
TSH SERPL-MCNC: 5.97 UIU/ML — HIGH (ref 0.27–4.2)
WBC # BLD: 6.87 K/UL — SIGNIFICANT CHANGE UP (ref 3.8–10.5)
WBC # FLD AUTO: 6.87 K/UL — SIGNIFICANT CHANGE UP (ref 3.8–10.5)

## 2024-06-24 PROCEDURE — 84480 ASSAY TRIIODOTHYRONINE (T3): CPT

## 2024-06-24 PROCEDURE — 85610 PROTHROMBIN TIME: CPT

## 2024-06-24 PROCEDURE — 83036 HEMOGLOBIN GLYCOSYLATED A1C: CPT

## 2024-06-24 PROCEDURE — 99233 SBSQ HOSP IP/OBS HIGH 50: CPT

## 2024-06-24 PROCEDURE — 83690 ASSAY OF LIPASE: CPT

## 2024-06-24 PROCEDURE — 84439 ASSAY OF FREE THYROXINE: CPT

## 2024-06-24 PROCEDURE — 85730 THROMBOPLASTIN TIME PARTIAL: CPT

## 2024-06-24 PROCEDURE — 80048 BASIC METABOLIC PNL TOTAL CA: CPT

## 2024-06-24 PROCEDURE — 93005 ELECTROCARDIOGRAM TRACING: CPT

## 2024-06-24 PROCEDURE — 87641 MR-STAPH DNA AMP PROBE: CPT

## 2024-06-24 PROCEDURE — 84436 ASSAY OF TOTAL THYROXINE: CPT

## 2024-06-24 PROCEDURE — 86850 RBC ANTIBODY SCREEN: CPT

## 2024-06-24 PROCEDURE — 83880 ASSAY OF NATRIURETIC PEPTIDE: CPT

## 2024-06-24 PROCEDURE — 99232 SBSQ HOSP IP/OBS MODERATE 35: CPT

## 2024-06-24 PROCEDURE — 86900 BLOOD TYPING SEROLOGIC ABO: CPT

## 2024-06-24 PROCEDURE — 71260 CT THORAX DX C+: CPT | Mod: MC

## 2024-06-24 PROCEDURE — 82962 GLUCOSE BLOOD TEST: CPT

## 2024-06-24 PROCEDURE — 85027 COMPLETE CBC AUTOMATED: CPT

## 2024-06-24 PROCEDURE — 80202 ASSAY OF VANCOMYCIN: CPT

## 2024-06-24 PROCEDURE — 84702 CHORIONIC GONADOTROPIN TEST: CPT

## 2024-06-24 PROCEDURE — 99024 POSTOP FOLLOW-UP VISIT: CPT

## 2024-06-24 PROCEDURE — 84145 PROCALCITONIN (PCT): CPT

## 2024-06-24 PROCEDURE — 87040 BLOOD CULTURE FOR BACTERIA: CPT

## 2024-06-24 PROCEDURE — 86901 BLOOD TYPING SEROLOGIC RH(D): CPT

## 2024-06-24 PROCEDURE — 87086 URINE CULTURE/COLONY COUNT: CPT

## 2024-06-24 PROCEDURE — 83605 ASSAY OF LACTIC ACID: CPT

## 2024-06-24 PROCEDURE — 80053 COMPREHEN METABOLIC PANEL: CPT

## 2024-06-24 PROCEDURE — 96375 TX/PRO/DX INJ NEW DRUG ADDON: CPT

## 2024-06-24 PROCEDURE — 84443 ASSAY THYROID STIM HORMONE: CPT

## 2024-06-24 PROCEDURE — 96374 THER/PROPH/DIAG INJ IV PUSH: CPT

## 2024-06-24 PROCEDURE — 74177 CT ABD & PELVIS W/CONTRAST: CPT | Mod: MC

## 2024-06-24 PROCEDURE — 99285 EMERGENCY DEPT VISIT HI MDM: CPT

## 2024-06-24 PROCEDURE — 36415 COLL VENOUS BLD VENIPUNCTURE: CPT

## 2024-06-24 PROCEDURE — 81001 URINALYSIS AUTO W/SCOPE: CPT

## 2024-06-24 PROCEDURE — 83735 ASSAY OF MAGNESIUM: CPT

## 2024-06-24 PROCEDURE — 84134 ASSAY OF PREALBUMIN: CPT

## 2024-06-24 PROCEDURE — 87640 STAPH A DNA AMP PROBE: CPT

## 2024-06-24 PROCEDURE — 85025 COMPLETE CBC W/AUTO DIFF WBC: CPT

## 2024-06-24 RX ORDER — LEVOTHYROXINE SODIUM 25 MCG
1 TABLET ORAL
Qty: 30 | Refills: 0
Start: 2024-06-24 | End: 2024-07-23

## 2024-06-24 RX ORDER — TOPIRAMATE 25 MG
1 TABLET ORAL
Refills: 0 | DISCHARGE

## 2024-06-24 RX ORDER — LEVOTHYROXINE SODIUM 25 MCG
175 TABLET ORAL DAILY
Refills: 0 | Status: DISCONTINUED | OUTPATIENT
Start: 2024-06-25 | End: 2024-06-24

## 2024-06-24 RX ORDER — METOCLOPRAMIDE 5 MG/5ML
1 SOLUTION ORAL
Qty: 42 | Refills: 0
Start: 2024-06-24 | End: 2024-07-07

## 2024-06-24 RX ORDER — METOCLOPRAMIDE 5 MG/5ML
10 SOLUTION ORAL EVERY 8 HOURS
Refills: 0 | Status: DISCONTINUED | OUTPATIENT
Start: 2024-06-24 | End: 2024-06-24

## 2024-06-24 RX ORDER — CIPROFLOXACIN HCL 500 MG
1 TABLET ORAL
Qty: 9 | Refills: 0
Start: 2024-06-24 | End: 2024-06-27

## 2024-06-24 RX ORDER — LEVOTHYROXINE SODIUM 125 MCG
1 TABLET ORAL
Refills: 0 | DISCHARGE

## 2024-06-24 RX ORDER — ARIPIPRAZOLE 15 MG/1
1 TABLET ORAL
Refills: 0 | DISCHARGE

## 2024-06-24 RX ORDER — ARIPIPRAZOLE 15 MG/1
2 TABLET ORAL
Qty: 0 | Refills: 0 | DISCHARGE
Start: 2024-06-24

## 2024-06-24 RX ORDER — SACCHAROMYCES BOULARDII 50 MG
1 CAPSULE ORAL
Qty: 9 | Refills: 0
Start: 2024-06-24 | End: 2024-06-27

## 2024-06-24 RX ORDER — TOPIRAMATE 50 MG/1
100 TABLET, FILM COATED ORAL
Qty: 0 | Refills: 0 | DISCHARGE
Start: 2024-06-24

## 2024-06-24 RX ORDER — SULFAMETHOXAZOLE AND TRIMETHOPRIM 800; 160 MG/1; MG/1
1 TABLET ORAL
Refills: 0 | Status: DISCONTINUED | OUTPATIENT
Start: 2024-06-24 | End: 2024-06-24

## 2024-06-24 RX ADMIN — Medication 650 MILLIGRAM(S): at 06:30

## 2024-06-24 RX ADMIN — Medication 650 MILLIGRAM(S): at 05:30

## 2024-06-24 RX ADMIN — Medication 3 MILLILITER(S): at 14:14

## 2024-06-24 RX ADMIN — MEROPENEM 1000 MILLIGRAM(S): 500 INJECTION, POWDER, FOR SOLUTION INTRAVENOUS at 14:15

## 2024-06-24 RX ADMIN — PANTOPRAZOLE SODIUM 40 MILLIGRAM(S): 40 INJECTION, POWDER, FOR SOLUTION INTRAVENOUS at 05:28

## 2024-06-24 RX ADMIN — FLUVOXAMINE MALEATE 200 MILLIGRAM(S): 50 TABLET ORAL at 14:15

## 2024-06-24 RX ADMIN — MEROPENEM 1000 MILLIGRAM(S): 500 INJECTION, POWDER, FOR SOLUTION INTRAVENOUS at 05:29

## 2024-06-24 RX ADMIN — Medication 250 MILLIGRAM(S): at 05:28

## 2024-06-24 RX ADMIN — Medication 300 MILLIGRAM(S): at 05:28

## 2024-06-24 RX ADMIN — HEPARIN SODIUM 5000 UNIT(S): 50 INJECTION, SOLUTION INTRAVENOUS at 05:29

## 2024-06-24 RX ADMIN — AMLODIPINE BESYLATE 10 MILLIGRAM(S): 2.5 TABLET ORAL at 05:28

## 2024-06-24 RX ADMIN — Medication 3 MILLILITER(S): at 05:21

## 2024-06-24 RX ADMIN — LOSARTAN POTASSIUM 25 MILLIGRAM(S): 100 TABLET, FILM COATED ORAL at 05:28

## 2024-06-24 RX ADMIN — HEPARIN SODIUM 5000 UNIT(S): 50 INJECTION, SOLUTION INTRAVENOUS at 14:15

## 2024-06-26 LAB
CULTURE RESULTS: SIGNIFICANT CHANGE UP
CULTURE RESULTS: SIGNIFICANT CHANGE UP
SPECIMEN SOURCE: SIGNIFICANT CHANGE UP
SPECIMEN SOURCE: SIGNIFICANT CHANGE UP

## 2024-06-27 ENCOUNTER — APPOINTMENT (OUTPATIENT)
Dept: THORACIC SURGERY | Facility: CLINIC | Age: 54
End: 2024-06-27

## 2024-06-27 ENCOUNTER — APPOINTMENT (OUTPATIENT)
Dept: ENDOCRINOLOGY | Facility: CLINIC | Age: 54
End: 2024-06-27
Payer: MEDICARE

## 2024-06-27 VITALS
BODY MASS INDEX: 35.97 KG/M2 | HEIGHT: 63 IN | OXYGEN SATURATION: 97 % | DIASTOLIC BLOOD PRESSURE: 70 MMHG | WEIGHT: 203 LBS | SYSTOLIC BLOOD PRESSURE: 122 MMHG | HEART RATE: 79 BPM

## 2024-06-27 DIAGNOSIS — E78.5 HYPERLIPIDEMIA, UNSPECIFIED: ICD-10-CM

## 2024-06-27 DIAGNOSIS — E11.9 TYPE 2 DIABETES MELLITUS W/OUT COMPLICATIONS: ICD-10-CM

## 2024-06-27 DIAGNOSIS — E55.9 VITAMIN D DEFICIENCY, UNSPECIFIED: ICD-10-CM

## 2024-06-27 DIAGNOSIS — E03.9 HYPOTHYROIDISM, UNSPECIFIED: ICD-10-CM

## 2024-06-27 LAB — GLUCOSE BLDC GLUCOMTR-MCNC: 147

## 2024-06-27 PROCEDURE — 99214 OFFICE O/P EST MOD 30 MIN: CPT

## 2024-06-29 ENCOUNTER — NON-APPOINTMENT (OUTPATIENT)
Age: 54
End: 2024-06-29

## 2024-07-01 ENCOUNTER — APPOINTMENT (OUTPATIENT)
Dept: ORTHOPEDIC SURGERY | Facility: CLINIC | Age: 54
End: 2024-07-01
Payer: MEDICARE

## 2024-07-01 VITALS
BODY MASS INDEX: 35.44 KG/M2 | WEIGHT: 200 LBS | SYSTOLIC BLOOD PRESSURE: 110 MMHG | HEART RATE: 103 BPM | HEIGHT: 63 IN | DIASTOLIC BLOOD PRESSURE: 76 MMHG

## 2024-07-01 DIAGNOSIS — M25.532 PAIN IN LEFT WRIST: ICD-10-CM

## 2024-07-01 PROCEDURE — 99203 OFFICE O/P NEW LOW 30 MIN: CPT

## 2024-07-01 PROCEDURE — 99213 OFFICE O/P EST LOW 20 MIN: CPT

## 2024-07-01 PROCEDURE — 73110 X-RAY EXAM OF WRIST: CPT | Mod: LT

## 2024-07-02 PROBLEM — S63.642A SPRAIN OF METACARPOPHALANGEAL (MCP) JOINT OF LEFT THUMB, INITIAL ENCOUNTER: Status: ACTIVE | Noted: 2024-07-02

## 2024-07-02 PROBLEM — M25.532 LEFT WRIST PAIN: Status: ACTIVE | Noted: 2024-07-01

## 2024-07-02 PROBLEM — M79.642 PAIN OF LEFT HAND: Status: ACTIVE | Noted: 2024-07-01

## 2024-07-09 ENCOUNTER — RX RENEWAL (OUTPATIENT)
Age: 54
End: 2024-07-09

## 2024-07-11 ENCOUNTER — APPOINTMENT (OUTPATIENT)
Dept: THORACIC SURGERY | Facility: CLINIC | Age: 54
End: 2024-07-11
Payer: MEDICARE

## 2024-07-11 VITALS
RESPIRATION RATE: 16 BRPM | TEMPERATURE: 97.7 F | DIASTOLIC BLOOD PRESSURE: 83 MMHG | OXYGEN SATURATION: 99 % | WEIGHT: 200 LBS | BODY MASS INDEX: 35.44 KG/M2 | HEIGHT: 63 IN | SYSTOLIC BLOOD PRESSURE: 132 MMHG | HEART RATE: 72 BPM

## 2024-07-11 DIAGNOSIS — K44.9 DIAPHRAGMATIC HERNIA W/OUT OBSTRUCTION OR GANGRENE: ICD-10-CM

## 2024-07-11 DIAGNOSIS — R11.0 NAUSEA: ICD-10-CM

## 2024-07-11 DIAGNOSIS — R18.8 OTHER ASCITES: ICD-10-CM

## 2024-07-11 PROCEDURE — 99024 POSTOP FOLLOW-UP VISIT: CPT

## 2024-07-12 ENCOUNTER — APPOINTMENT (OUTPATIENT)
Dept: UROGYNECOLOGY | Facility: CLINIC | Age: 54
End: 2024-07-12

## 2024-07-12 DIAGNOSIS — N39.46 MIXED INCONTINENCE: ICD-10-CM

## 2024-07-12 DIAGNOSIS — R35.0 FREQUENCY OF MICTURITION: ICD-10-CM

## 2024-07-12 DIAGNOSIS — N39.3 STRESS INCONTINENCE (FEMALE) (MALE): ICD-10-CM

## 2024-07-12 PROCEDURE — 99213 OFFICE O/P EST LOW 20 MIN: CPT

## 2024-07-15 ENCOUNTER — APPOINTMENT (OUTPATIENT)
Dept: ORTHOPEDIC SURGERY | Facility: CLINIC | Age: 54
End: 2024-07-15
Payer: MEDICARE

## 2024-07-15 VITALS
TEMPERATURE: 98.1 F | WEIGHT: 200 LBS | HEIGHT: 63 IN | BODY MASS INDEX: 35.44 KG/M2 | HEART RATE: 73 BPM | SYSTOLIC BLOOD PRESSURE: 121 MMHG | DIASTOLIC BLOOD PRESSURE: 79 MMHG

## 2024-07-15 DIAGNOSIS — S63.642A SPRAIN OF METACARPOPHALANGEAL JOINT OF LEFT THUMB, INITIAL ENCOUNTER: ICD-10-CM

## 2024-07-15 DIAGNOSIS — S63.649D: ICD-10-CM

## 2024-07-15 DIAGNOSIS — M79.642 PAIN IN LEFT HAND: ICD-10-CM

## 2024-07-15 PROCEDURE — 99213 OFFICE O/P EST LOW 20 MIN: CPT

## 2024-07-16 ENCOUNTER — RX RENEWAL (OUTPATIENT)
Age: 54
End: 2024-07-16

## 2024-07-18 LAB
APPEARANCE: CLEAR
BACTERIA: NEGATIVE /HPF
BILIRUBIN URINE: NEGATIVE
BLOOD URINE: NEGATIVE
CAST: 0 /LPF
COLOR: YELLOW
EPITHELIAL CELLS: 1 /HPF
GLUCOSE QUALITATIVE U: NEGATIVE MG/DL
MICROSCOPIC-UA: NORMAL
NITRITE URINE: NEGATIVE
PROTEIN URINE: NEGATIVE MG/DL
RED BLOOD CELLS URINE: 0 /HPF
SPECIFIC GRAVITY URINE: 1.01
UROBILINOGEN URINE: 0.2 MG/DL
WHITE BLOOD CELLS URINE: 0 /HPF

## 2024-07-19 ENCOUNTER — APPOINTMENT (OUTPATIENT)
Dept: MAMMOGRAPHY | Facility: CLINIC | Age: 54
End: 2024-07-19
Payer: MEDICARE

## 2024-07-19 PROCEDURE — 77063 BREAST TOMOSYNTHESIS BI: CPT

## 2024-07-19 PROCEDURE — 77067 SCR MAMMO BI INCL CAD: CPT

## 2024-07-23 DIAGNOSIS — N39.0 URINARY TRACT INFECTION, SITE NOT SPECIFIED: ICD-10-CM

## 2024-07-23 LAB — BACTERIA UR CULT: ABNORMAL

## 2024-07-23 RX ORDER — CIPROFLOXACIN HYDROCHLORIDE 500 MG/1
500 TABLET, FILM COATED ORAL
Qty: 5 | Refills: 0 | Status: ACTIVE | COMMUNITY
Start: 2024-07-23 | End: 1900-01-01

## 2024-08-01 ENCOUNTER — APPOINTMENT (OUTPATIENT)
Dept: THORACIC SURGERY | Facility: CLINIC | Age: 54
End: 2024-08-01

## 2024-08-08 ENCOUNTER — APPOINTMENT (OUTPATIENT)
Dept: THORACIC SURGERY | Facility: CLINIC | Age: 54
End: 2024-08-08

## 2024-08-08 PROCEDURE — 99024 POSTOP FOLLOW-UP VISIT: CPT

## 2024-08-09 ENCOUNTER — NON-APPOINTMENT (OUTPATIENT)
Age: 54
End: 2024-08-09

## 2024-08-12 NOTE — DISCUSSION/SUMMARY
[FreeTextEntry1] : CT ABDOMEN AND PELVIS IC performed at [facility] CT CHEST IC PROCEDURE DATE: 06/21/2024 INTERPRETATION: CLINICAL INFORMATION: Abdominal pain, status post hiatal hernia repair in May 2024. COMPARISON: 1/28/2024 and 5/11/2024. CONTRAST/COMPLICATIONS: IV Contrast: Omnipaque 350 90 cc administered 10 cc discarded Oral Contrast: NONE Complications: None reported at time of study completion  PROCEDURE: CT of the Chest, Abdomen and Pelvis was performed. Sagittal and coronal reformats were performed.  FINDINGS: CHEST: LUNGS AND LARGE AIRWAYS: Patent central airways. No pulmonary nodules. PLEURA: No pleural effusion. VESSELS: Within normal limits. HEART: Heart size is normal. No pericardial effusion. MEDIASTINUM AND ADÁN: No lymphadenopathy. CHEST WALL AND LOWER NECK: Within normal limits.  ABDOMEN AND PELVIS: LIVER: Within normal limits. BILE DUCTS: Normal caliber. GALLBLADDER: Cholecystectomy. SPLEEN: Within normal limits. PANCREAS: Within normal limits. ADRENALS: Within normal limits. KIDNEYS/URETERS: Multiple, ill-defined and new cortical hypodensities in both kidneys, a few of which are linear and a few are round. No perinephric fatty stranding. 3 mm nonobstructing stone in the left lower pole.  BLADDER: Decompressed. REPRODUCTIVE ORGANS: Uterus and adnexa within normal limits.  BOWEL: No bowel obstruction. Appendix is normal. Postsurgical changes at GE junction from hiatal hernia repair. No adjacent fluid, fatty stranding or collection. PERITONEUM/RETROPERITONEUM: Within normal limits. VESSELS: Within normal limits. LYMPH NODES: No lymphadenopathy. ABDOMINAL WALL: Fat-containing umbilical hernia with adjacent fatty stranding. A 2 cm fluid collection within the hernia sac. BONES: Degenerative changes. A spinal stimulator device in the right gluteal subcutaneous tissues with a lead entering at right S3. A stable lucent lesion with sclerotic borders involving the right inferior pubic ramus.  IMPRESSION: Findings are suggestive of bilateral pyelonephritis. A small collection within the umbilical hernia with adjacent fatty stranding, suggestive of abscess. Clear lungs.  --- End of Report ---  MATILDA PATTERSON MD; Attending Radiologist.

## 2024-08-12 NOTE — DISCUSSION/SUMMARY
[FreeTextEntry1] : CT ABDOMEN AND PELVIS IC performed at [facility] CT CHEST IC PROCEDURE DATE: 06/21/2024 INTERPRETATION: CLINICAL INFORMATION: Abdominal pain, status post hiatal hernia repair in May 2024. COMPARISON: 1/28/2024 and 5/11/2024. CONTRAST/COMPLICATIONS: IV Contrast: Omnipaque 350 90 cc administered 10 cc discarded Oral Contrast: NONE Complications: None reported at time of study completion  PROCEDURE: CT of the Chest, Abdomen and Pelvis was performed. Sagittal and coronal reformats were performed.  FINDINGS: CHEST: LUNGS AND LARGE AIRWAYS: Patent central airways. No pulmonary nodules. PLEURA: No pleural effusion. VESSELS: Within normal limits. HEART: Heart size is normal. No pericardial effusion. MEDIASTINUM AND ADÁN: No lymphadenopathy. CHEST WALL AND LOWER NECK: Within normal limits.  ABDOMEN AND PELVIS: LIVER: Within normal limits. BILE DUCTS: Normal caliber. GALLBLADDER: Cholecystectomy. SPLEEN: Within normal limits. PANCREAS: Within normal limits. ADRENALS: Within normal limits. KIDNEYS/URETERS: Multiple, ill-defined and new cortical hypodensities in both kidneys, a few of which are linear and a few are round. No perinephric fatty stranding. 3 mm nonobstructing stone in the left lower pole.  BLADDER: Decompressed. REPRODUCTIVE ORGANS: Uterus and adnexa within normal limits.  BOWEL: No bowel obstruction. Appendix is normal. Postsurgical changes at GE junction from hiatal hernia repair. No adjacent fluid, fatty stranding or collection. PERITONEUM/RETROPERITONEUM: Within normal limits. VESSELS: Within normal limits. LYMPH NODES: No lymphadenopathy. ABDOMINAL WALL: Fat-containing umbilical hernia with adjacent fatty stranding. A 2 cm fluid collection within the hernia sac. BONES: Degenerative changes. A spinal stimulator device in the right gluteal subcutaneous tissues with a lead entering at right S3. A stable lucent lesion with sclerotic borders involving the right inferior pubic ramus.  IMPRESSION: Findings are suggestive of bilateral pyelonephritis. A small collection within the umbilical hernia with adjacent fatty stranding, suggestive of abscess. Clear lungs.  --- End of Report ---  MATILDA PATTERSON MD; Attending Radiologist.

## 2024-08-12 NOTE — REASON FOR VISIT
[de-identified] : Upper endoscopy with redo hiatal hernia repair, lysis of adhesion, Toupet fundoplication, and partial fundus resection. [de-identified] : 5/10/2024 [de-identified] : 54 year old female PMH includes HLD, HTN, Hypothyroidism, OCD, LORA, type 2 diabetes mellitus, gastroesophageal reflux disease and hiatal hernia status post repair in 4/2023 with recurrence and symptoms of dysphagia. She then underwent the above procedure. Small right PTX stable on serial x-ray postoperatively. Discharged 5/12/2024.  She presented to [facility] ED on 6/13/24 with nausea, diarrhea, and abdominal pain. She subsequently had CT imaging noting development of possible abscess within the anterior abdominal wall hernia, for which she was started on antibiotics.  6/20 office visit - small area of fluctuation deep to the umbilical wound; PO antibiotics prescribed.  6/20-6/24: admitted for evaluation of abdominal collection/IV ABX. Physical exam consistent with umbilical hernia with fat necrosis. Afebrile with no leukocytosis, blood and urine cultures negative. No I&D indicated. Seen by ID, treated empirically with IV antibiotics. Upon review of HIE, patient noted to have positive urine culture (Klebsiella pneumoniae and Proteus mirabilis) from 6/6/2024, for which patient was discharged home on Ciprofloxacin 500mg q12h. Patient also noted to have elevated TSH 10.81, for which endocrinology recommended increasing dose of Synthroid to 175 mcg on discharge with outpatient follow up. Discharged 6/24.  She presents today for clinical evaluation. She continues to report nausea and states that she notices it has been severe since increasing her doses of Ozempic which she takes for diabetes and weight loss.   [Parent] : parent

## 2024-08-12 NOTE — DISCUSSION/SUMMARY
[FreeTextEntry1] : CT ABDOMEN AND PELVIS IC performed at [facility] CT CHEST IC PROCEDURE DATE: 06/21/2024 INTERPRETATION: CLINICAL INFORMATION: Abdominal pain, status post hiatal hernia repair in May 2024. COMPARISON: 1/28/2024 and 5/11/2024. CONTRAST/COMPLICATIONS: IV Contrast: Omnipaque 350 90 cc administered 10 cc discarded Oral Contrast: NONE Complications: None reported at time of study completion  PROCEDURE: CT of the Chest, Abdomen and Pelvis was performed. Sagittal and coronal reformats were performed.  FINDINGS: CHEST: LUNGS AND LARGE AIRWAYS: Patent central airways. No pulmonary nodules. PLEURA: No pleural effusion. VESSELS: Within normal limits. HEART: Heart size is normal. No pericardial effusion. MEDIASTINUM AND ADÁN: No lymphadenopathy. CHEST WALL AND LOWER NECK: Within normal limits.  ABDOMEN AND PELVIS: LIVER: Within normal limits. BILE DUCTS: Normal caliber. GALLBLADDER: Cholecystectomy. SPLEEN: Within normal limits. PANCREAS: Within normal limits. ADRENALS: Within normal limits. KIDNEYS/URETERS: Multiple, ill-defined and new cortical hypodensities in both kidneys, a few of which are linear and a few are round. No perinephric fatty stranding. 3 mm nonobstructing stone in the left lower pole.  BLADDER: Decompressed. REPRODUCTIVE ORGANS: Uterus and adnexa within normal limits.  BOWEL: No bowel obstruction. Appendix is normal. Postsurgical changes at GE junction from hiatal hernia repair. No adjacent fluid, fatty stranding or collection. PERITONEUM/RETROPERITONEUM: Within normal limits. VESSELS: Within normal limits. LYMPH NODES: No lymphadenopathy. ABDOMINAL WALL: Fat-containing umbilical hernia with adjacent fatty stranding. A 2 cm fluid collection within the hernia sac. BONES: Degenerative changes. A spinal stimulator device in the right gluteal subcutaneous tissues with a lead entering at right S3. A stable lucent lesion with sclerotic borders involving the right inferior pubic ramus.  IMPRESSION: Findings are suggestive of bilateral pyelonephritis. A small collection within the umbilical hernia with adjacent fatty stranding, suggestive of abscess. Clear lungs.  --- End of Report ---  MATILDA PATTERSON MD; Attending Radiologist.

## 2024-08-12 NOTE — PHYSICAL EXAM
[Respiration, Rhythm And Depth] : normal respiratory rhythm and effort [Heart Rate And Rhythm] : heart rate was normal and rhythm regular [Site: ___] : Site: [unfilled] [Clean] : clean [Dry] : dry [Healing Well] : healing well

## 2024-08-12 NOTE — REASON FOR VISIT
[de-identified] : Upper endoscopy with redo hiatal hernia repair, lysis of adhesion, Toupet fundoplication, and partial fundus resection. [de-identified] : 5/10/2024 [de-identified] : 54 year old female PMH includes HLD, HTN, Hypothyroidism, OCD, LORA, type 2 diabetes mellitus, gastroesophageal reflux disease and hiatal hernia status post repair in 4/2023 with recurrence and symptoms of dysphagia. She then underwent the above procedure. Small right PTX stable on serial x-ray postoperatively. Discharged 5/12/2024.  She presented to [facility] ED on 6/13/24 with nausea, diarrhea, and abdominal pain. She subsequently had CT imaging noting development of possible abscess within the anterior abdominal wall hernia, for which she was started on antibiotics.  6/20 office visit - small area of fluctuation deep to the umbilical wound; PO antibiotics prescribed.  6/20-6/24: admitted for evaluation of abdominal collection/IV ABX. Physical exam consistent with umbilical hernia with fat necrosis. Afebrile with no leukocytosis, blood and urine cultures negative. No I&D indicated. Seen by ID, treated empirically with IV antibiotics. Upon review of HIE, patient noted to have positive urine culture (Klebsiella pneumoniae and Proteus mirabilis) from 6/6/2024, for which patient was discharged home on Ciprofloxacin 500mg q12h. Patient also noted to have elevated TSH 10.81, for which endocrinology recommended increasing dose of Synthroid to 175 mcg on discharge with outpatient follow up. Discharged 6/24.  She presents today for clinical evaluation. She continues to report nausea and states that she notices it has been severe since increasing her doses of Ozempic which she takes for diabetes and weight loss.   [Parent] : parent

## 2024-08-12 NOTE — ASSESSMENT
[FreeTextEntry1] : Ms Freitas presents today with continued complaints of nausea and vomiting. Unfortunately, she is also taking Ozempic for her diabetes and weight loss. The side effects are profound for her in combination with her hernia repair and at this time I have recommended follow up with Endocrine to search for an alternative treatment. She is agreeable and will follow up with them for diabetes management. She will return to care in 4 months for clinical evaluation.   PLAN: - Return to care in 4 months for clinical evaluation      I, Dr. Lr personally performed the evaluation and management (E/M) services for this patient. That E/M includes conducting the initial examination, assessing all conditions, and establishing the plan of care. Today, Solitario Barker NP was here to observe my evaluation and management services for this patient.

## 2024-08-12 NOTE — REASON FOR VISIT
[de-identified] : Upper endoscopy with redo hiatal hernia repair, lysis of adhesion, Toupet fundoplication, and partial fundus resection. [de-identified] : 5/10/2024 [de-identified] : 54 year old female PMH includes HLD, HTN, Hypothyroidism, OCD, LORA, type 2 diabetes mellitus, gastroesophageal reflux disease and hiatal hernia status post repair in 4/2023 with recurrence and symptoms of dysphagia. She then underwent the above procedure. Small right PTX stable on serial x-ray postoperatively. Discharged 5/12/2024.  She presented to [facility] ED on 6/13/24 with nausea, diarrhea, and abdominal pain. She subsequently had CT imaging noting development of possible abscess within the anterior abdominal wall hernia, for which she was started on antibiotics.  6/20 office visit - small area of fluctuation deep to the umbilical wound; PO antibiotics prescribed.  6/20-6/24: admitted for evaluation of abdominal collection/IV ABX. Physical exam consistent with umbilical hernia with fat necrosis. Afebrile with no leukocytosis, blood and urine cultures negative. No I&D indicated. Seen by ID, treated empirically with IV antibiotics. Upon review of HIE, patient noted to have positive urine culture (Klebsiella pneumoniae and Proteus mirabilis) from 6/6/2024, for which patient was discharged home on Ciprofloxacin 500mg q12h. Patient also noted to have elevated TSH 10.81, for which endocrinology recommended increasing dose of Synthroid to 175 mcg on discharge with outpatient follow up. Discharged 6/24.  She presents today for clinical evaluation. She continues to report nausea and states that she notices it has been severe since increasing her doses of Ozempic which she takes for diabetes and weight loss.   [Parent] : parent

## 2024-08-15 ENCOUNTER — APPOINTMENT (OUTPATIENT)
Dept: ORTHOPEDIC SURGERY | Facility: CLINIC | Age: 54
End: 2024-08-15
Payer: MEDICARE

## 2024-08-15 PROCEDURE — 99213 OFFICE O/P EST LOW 20 MIN: CPT | Mod: 25

## 2024-08-15 PROCEDURE — 20610 DRAIN/INJ JOINT/BURSA W/O US: CPT | Mod: RT

## 2024-08-15 RX ORDER — HYALURONATE SODIUM 20 MG/2 ML
20 SYRINGE (ML) INTRAARTICULAR
Qty: 1 | Refills: 0 | Status: ACTIVE | COMMUNITY
Start: 2024-08-15

## 2024-08-15 NOTE — HISTORY OF PRESENT ILLNESS
[de-identified] : 54-year-old female presents to the office for follow-up of right knee osteoarthritis.  She completed a series of viscosupplementation in February which did provide excellent resolution of her symptoms.  She states that her pain has gradually began to return.  It is exacerbated by rising from a seated position ambulating and navigating stairs.  He stays active at home by swimming about 3-4 times per week which does aid in her symptomatic relief.  Ambulates without the use of assistive device.  Takes Tylenol occasionally for the pain.  Denies any recent injuries falls or trauma, numbness or tingling to her lower extremity, locking catching or buckling in the knee.  Of note she recently underwent hiatal hernia surgery as well as she recently stopped Ozempic.

## 2024-08-15 NOTE — DISCUSSION/SUMMARY
[Medication Risks Reviewed] : Medication risks reviewed [Surgical risks reviewed] : Surgical risks reviewed [de-identified] : 54-year-old female presents to the office for follow-up of her moderate right knee osteoarthritis.  She has had excellent resolution with conservative therapies in the past and would like to continue with that today.  She is not eligible for repeat series of viscosupplementation would like to proceed.  I have given her injection of Euflexxa to the right knee which she tolerated well.  I recommend she continue with her at home exercise regimen, focusing on low impact active and exercise.  She may continue to take Tylenol and use Voltaren gel as needed for pain.  She should follow-up in 1 week for repeat injection.  All questions were addressed, patient in agreement.

## 2024-08-15 NOTE — PHYSICAL EXAM
[de-identified] : Right knee exam shows mild effusion, ROM is 0-120 degrees, no instability, pain with Bettina, medial joint line tenderness. 5/5 motor strength in bilateral lower extremities. Sensory: Intact in bilateral lower extremities. DTRs: Biceps, brachioradialis, triceps, patellar, ankle and plantar 2+ and symmetric bilaterally. Pulses: dorsalis pedis, posterior tibial, femoral, popliteal, and radial 2+ and symmetric bilaterally.

## 2024-08-15 NOTE — PROCEDURE
[de-identified] : Euflexxa # 1 Right knee Discussed at length with the patient the planned Euflexxa injection. The risks, benefits, convalescence and alternatives were reviewed. The possible side effects discussed included but were not limited to: pain, swelling, heat and redness. There symptoms are generally mild but if they are extensive then contact the office. Giving pain relievers by mouth such as NSAIDs or Tylenol can generally treat the reactions to Euflexxa. Rare cases of infection have been noted. Rash, hives and itching may occur post injection. If you have muscle pain or cramps, flushing and or swelling of the face, rapid heart beat, nausea, dizziness, fever, chills, headache, difficulty breathing, swelling in the arms or legs, or have a prickly feeling of your skin, contact a health care provider immediately.  Following this discussion, the knee was prepped with betadine and under sterile condition the Euflexxa injection was performed with a 22 gauge needle. The needle was introduced into the joint, aspiration was performed to ensure intra-articular placement and the medication was injected. Upon withdrawal of the needle the site was cleaned with alcohol and a bandaid applied. The patient tolerated the injection well and there were no adverse effects. Post injection instructions included no strenuous activity for 24 hours, cryotherapy and if there are any adverse effects to contact the office.

## 2024-08-22 ENCOUNTER — APPOINTMENT (OUTPATIENT)
Dept: ORTHOPEDIC SURGERY | Facility: CLINIC | Age: 54
End: 2024-08-22
Payer: MEDICARE

## 2024-08-22 PROCEDURE — 20610 DRAIN/INJ JOINT/BURSA W/O US: CPT | Mod: RT

## 2024-08-22 NOTE — REASON FOR VISIT
[Follow-Up Visit] : a follow-up visit for [FreeTextEntry2] : right Euflexxa #2 LOT [id number] EXP 03/30/2025

## 2024-08-22 NOTE — HISTORY OF PRESENT ILLNESS
[de-identified] : 54-year-old female presents to the office for Euflexxa injection 2 out of 3 to the right knee.

## 2024-08-22 NOTE — PROCEDURE
[de-identified] : Euflexxa # 2 right knee Discussed at length with the patient the planned Euflexxa injection. The risks, benefits, convalescence and alternatives were reviewed. The possible side effects discussed included but were not limited to: pain, swelling, heat and redness. There symptoms are generally mild but if they are extensive then contact the office. Giving pain relievers by mouth such as NSAIDs or Tylenol can generally treat the reactions to Euflexxa. Rare cases of infection have been noted. Rash, hives and itching may occur post injection. If you have muscle pain or cramps, flushing and or swelling of the face, rapid heart beat, nausea, dizziness, fever, chills, headache, difficulty breathing, swelling in the arms or legs, or have a prickly feeling of your skin, contact a health care provider immediately.  Following this discussion, the knee was prepped with betadine and under sterile condition the Euflexxa injection was performed with a 22 gauge needle. The needle was introduced into the joint, aspiration was performed to ensure intra-articular placement and the medication was injected. Upon withdrawal of the needle the site was cleaned with alcohol and a bandaid applied. The patient tolerated the injection well and there were no adverse effects. Post injection instructions included no strenuous activity for 24 hours, cryotherapy and if there are any adverse effects to contact the office.

## 2024-08-22 NOTE — DISCUSSION/SUMMARY
[Medication Risks Reviewed] : Medication risks reviewed [de-identified] : 54-year-old female presents to the office status post Euflexxa injection 2 out of 3 to the right knee.  Tolerated well.  Will follow-up in 1 week for repeat injection.  All questions addressed, patient in agreement.

## 2024-08-27 ENCOUNTER — APPOINTMENT (OUTPATIENT)
Dept: ORTHOPEDIC SURGERY | Facility: CLINIC | Age: 54
End: 2024-08-27
Payer: MEDICARE

## 2024-08-27 DIAGNOSIS — M17.11 UNILATERAL PRIMARY OSTEOARTHRITIS, RIGHT KNEE: ICD-10-CM

## 2024-08-27 PROCEDURE — 20610 DRAIN/INJ JOINT/BURSA W/O US: CPT | Mod: RT

## 2024-08-27 NOTE — REASON FOR VISIT
[Follow-Up Visit] : a follow-up visit for [FreeTextEntry2] :  right Euflexxa #3 LOT [id number] EXP 03/30/2025.

## 2024-08-27 NOTE — HISTORY OF PRESENT ILLNESS
[de-identified] : 54-year-old female presents to the office for Euflexxa injection 3-3 to the right knee.

## 2024-08-27 NOTE — DISCUSSION/SUMMARY
[Medication Risks Reviewed] : Medication risks reviewed [Surgical risks reviewed] : Surgical risks reviewed [de-identified] : 54-year-old female presents to the office status post Euflexxa injection 3 of 3 to the right knee.  Tolerated well.  Will follow-up in 3 months for  repeat evaluation.  All questions addressed, patient agreement.

## 2024-08-27 NOTE — PROCEDURE
[de-identified] : Euflexxa # 3 Right knee Discussed at length with the patient the planned Euflexxa injection. The risks, benefits, convalescence and alternatives were reviewed. The possible side effects discussed included but were not limited to: pain, swelling, heat and redness. There symptoms are generally mild but if they are extensive then contact the office. Giving pain relievers by mouth such as NSAIDs or Tylenol can generally treat the reactions to Euflexxa. Rare cases of infection have been noted. Rash, hives and itching may occur post injection. If you have muscle pain or cramps, flushing and or swelling of the face, rapid heart beat, nausea, dizziness, fever, chills, headache, difficulty breathing, swelling in the arms or legs, or have a prickly feeling of your skin, contact a health care provider immediately.  Following this discussion, the knee was prepped with betadine and under sterile condition the Euflexxa injection was performed with a 22 gauge needle. The needle was introduced into the joint, aspiration was performed to ensure intra-articular placement and the medication was injected. Upon withdrawal of the needle the site was cleaned with alcohol and a bandaid applied. The patient tolerated the injection well and there were no adverse effects. Post injection instructions included no strenuous activity for 24 hours, cryotherapy and if there are any adverse effects to contact the office.

## 2024-09-04 ENCOUNTER — APPOINTMENT (OUTPATIENT)
Dept: ORTHOPEDIC SURGERY | Facility: CLINIC | Age: 54
End: 2024-09-04

## 2024-09-09 ENCOUNTER — APPOINTMENT (OUTPATIENT)
Dept: CARDIOLOGY | Facility: CLINIC | Age: 54
End: 2024-09-09
Payer: MEDICARE

## 2024-09-09 VITALS
RESPIRATION RATE: 14 BRPM | HEIGHT: 63 IN | BODY MASS INDEX: 34.2 KG/M2 | HEART RATE: 76 BPM | OXYGEN SATURATION: 100 % | WEIGHT: 193 LBS | SYSTOLIC BLOOD PRESSURE: 130 MMHG | DIASTOLIC BLOOD PRESSURE: 74 MMHG

## 2024-09-09 DIAGNOSIS — E78.5 HYPERLIPIDEMIA, UNSPECIFIED: ICD-10-CM

## 2024-09-09 DIAGNOSIS — K31.84 GASTROPARESIS: ICD-10-CM

## 2024-09-09 DIAGNOSIS — R07.89 OTHER CHEST PAIN: ICD-10-CM

## 2024-09-09 DIAGNOSIS — R94.39 ABNORMAL RESULT OF OTHER CARDIOVASCULAR FUNCTION STUDY: ICD-10-CM

## 2024-09-09 DIAGNOSIS — E11.9 TYPE 2 DIABETES MELLITUS W/OUT COMPLICATIONS: ICD-10-CM

## 2024-09-09 DIAGNOSIS — Z87.440 PERSONAL HISTORY OF URINARY (TRACT) INFECTIONS: ICD-10-CM

## 2024-09-09 DIAGNOSIS — Z98.890 OTHER SPECIFIED POSTPROCEDURAL STATES: ICD-10-CM

## 2024-09-09 DIAGNOSIS — Z87.19 OTHER SPECIFIED POSTPROCEDURAL STATES: ICD-10-CM

## 2024-09-09 DIAGNOSIS — I10 ESSENTIAL (PRIMARY) HYPERTENSION: ICD-10-CM

## 2024-09-09 DIAGNOSIS — G47.33 OBSTRUCTIVE SLEEP APNEA (ADULT) (PEDIATRIC): ICD-10-CM

## 2024-09-09 PROCEDURE — 99215 OFFICE O/P EST HI 40 MIN: CPT

## 2024-09-09 PROCEDURE — G2211 COMPLEX E/M VISIT ADD ON: CPT

## 2024-09-09 NOTE — DISCUSSION/SUMMARY
[FreeTextEntry1] : To review, Julianna is a very pleasant 54-year-old female with following active issues.  Hypertension: Well-controlled on current meds.  Continue same.  Hyperlipidemia: Tolerating statin therapy.  Controlled LDL -it was less than 40 on labs September 2024.  Diabetes: Continue follow-up with endocrinology.  Last A1c was 8.7 in September 2024  Patient with atypical left-sided chest pain as noted in HPI.  Previously, abnormal stress test left abnormal coronaries by cath in 2022.  Further evaluation with CTA of coronaries indicated.  If she has chest discomfort that lasts more than 5 minutes prior to CTA, she should go to the ER stat via 911.  Continue statins, ARB.  Unable to take aspirin without side effects.  Labs in September 2024 showed low TSH (0.09) normal free T4; patient will follow this up with endocrinologist.  Currently, clinically euthyroid.  Follow-up after CTA.  Thank you for this referral and allowing me to participate in the care of this patient.  If I can be of any further help or  if you have any questions, please do not hesitate to contact me   Sincerely,  Gregorio Stephenson MD, FACC, VENICE

## 2024-09-09 NOTE — HISTORY OF PRESENT ILLNESS
[FreeTextEntry1] : Julianna is a 54-year-old female with hypertension, obesity, diabetes, GERD.    After an abnormal stress test: Invasive coronary angiography in 2022 revealed normal coronary arteries.  He did have left submammary chest pressure symptoms lasting about 1 hour over the weekend.  She had slight sweats, no shortness of breath but had apprehension  Hypertension: Almost well-controlled.    Obesity, sleep apnea, on CPAP mask. No chest pain, testing dyspnea, PND, orthopnea or, pedal edema.  No history of cardiovascular events in the past year.    Patient with history of GERD.  She had hernia repair and fundoplication.  She tried Ozempic with which she had side effects and had to be stopped.  Echocardiogram October 2023 revealed EF of 55%.  Mid anteroseptal segment and mid inferoseptal segments are mildly hypokinetic?  Mild LVH.  Mild MR and trace AR.  No pericardial effusion.  Normal PASP.  Study was technically difficult.  EKG 4/29/2024: Sinus rhythm.  Left axis deviation.  LVH.  No ST-T abnormality.

## 2024-09-09 NOTE — PHYSICAL EXAM
[Soft] : abdomen soft [Normal] : no edema, no cyanosis, no clubbing, no varicosities [No Edema] : no edema [de-identified] : Obesity

## 2024-09-19 ENCOUNTER — NON-APPOINTMENT (OUTPATIENT)
Age: 54
End: 2024-09-19

## 2024-09-26 ENCOUNTER — APPOINTMENT (OUTPATIENT)
Dept: THORACIC SURGERY | Facility: CLINIC | Age: 54
End: 2024-09-26
Payer: MEDICARE

## 2024-09-26 VITALS
RESPIRATION RATE: 16 BRPM | HEIGHT: 63 IN | SYSTOLIC BLOOD PRESSURE: 154 MMHG | DIASTOLIC BLOOD PRESSURE: 95 MMHG | OXYGEN SATURATION: 100 % | WEIGHT: 193 LBS | BODY MASS INDEX: 34.2 KG/M2 | HEART RATE: 74 BPM

## 2024-09-26 PROCEDURE — 99214 OFFICE O/P EST MOD 30 MIN: CPT

## 2024-09-26 NOTE — DATA REVIEWED
[FreeTextEntry1] : Independent review of imaging and independent interpretation was performed at today's visit.  - CT Scan of the abdomen and pelvis from [facility]

## 2024-09-26 NOTE — ASSESSMENT
[FreeTextEntry1] : Ms. NATIVIDAD MORA is a 54-year-old female who presents for a follow up appointment. Past medical history includes HLD, HTN, Hypothyroidism, OCD, LORA, type 2 diabetes mellitus, gastroesophageal reflux disease and hiatal hernia status post repair in 4/2023 with recurrence and symptoms of dysphagia.  09/24/24: CT of the abdomen and Pelvis from [facility] - Small hiatal hernia   She has been experiencing difficulty swallowing and presents today for clinical evaluation. She had been evaluated at [facility] 2 weeks ago for chest pain. CT imaging was obtained revealing a small hiatal hernia She had it when she ate or when she took her medications. She has been coughing a lot more and is non productive. She is not taking PPI therapies. Given her profound symptoms and recent imaging noting hiatal hernia an Endoscopy should be performed.   I have reviewed the patient's medical records and diagnostic images at time of this office consultation and have made the following recommendation: 1. Endoscopy     Preoperative checklist (Reviewed with patient in office) - Confirm allergies, including latex: PENICILLIN - Confirm pacemaker: NONE - Anticoagulation/antiplatelets noted and will be discontinued/continued: NONE - SGLT-2 Inhibitors (discontinued 3 days prior to surgery) or GLP-1 (discontinued 1 week prior to surgery): NONE - All other supplements, NSAIDs and fish oil were discussed and will be held one week before surgery     I, Dr. Lr personally performed the evaluation and management (E/M) services for this patient. That E/M includes conducting the initial examination, assessing all conditions, and establishing the plan of care. Today, Solitario Barker NP was here to observe my evaluation and management services for this patient.

## 2024-09-26 NOTE — DATA REVIEWED
[FreeTextEntry1] : Independent review of imaging and independent interpretation was performed at today's visit.  - CT Scan of the abdomen and pelvis from [facility]

## 2024-09-26 NOTE — REVIEW OF SYSTEMS
[Feeling Poorly] : feeling poorly [Feeling Tired] : feeling tired [Chest Pain] : chest pain [Cough] : cough [Abdominal Pain] : abdominal pain [Vomiting] : vomiting [Heartburn] : heartburn [Negative] : Heme/Lymph [SOB on Exertion] : no shortness of breath during exertion [Constipation] : no constipation [Diarrhea] : no diarrhea [Melena] : no melena

## 2024-09-26 NOTE — HISTORY OF PRESENT ILLNESS
[FreeTextEntry1] : Ms. NATIVIDAD MORA is a 54-year-old female who presents for a follow up appointment. Past medical history includes HLD, HTN, Hypothyroidism, OCD, LORA, type 2 diabetes mellitus, gastroesophageal reflux disease and hiatal hernia status post repair in 4/2023 with recurrence and symptoms of dysphagia.  5/10/24 s/p Upper endoscopy with redo hiatal hernia repair, lysis of adhesion, Toupet fundoplication, and partial fundus resection   6/13/24: presented to [facility] ED with nausea, diarrhea, and abdominal pain. She subsequently had CT imaging noting development of possible abscess within the anterior abdominal wall hernia, for which she was started on antibiotics.  6/20 office visit - small area of fluctuation deep to the umbilical wound; PO antibiotics prescribed.  6/20-6/24: admitted for evaluation of abdominal collection/IV ABX. Physical exam consistent with umbilical hernia with fat necrosis. Afebrile with no leukocytosis, blood and urine cultures negative. No I&D indicated. Seen by ID, treated empirically with IV antibiotics. Upon review of HIE, patient noted to have positive urine culture (Klebsiella pneumoniae and Proteus mirabilis) from 6/6/2024, for which patient was discharged home on Ciprofloxacin 500mg q12h. Patient also noted to have elevated TSH 10.81, for which endocrinology recommended increasing dose of Synthroid to 175 mcg on discharge with outpatient follow up. Discharged 6/24.  6/21/24 CT abdomen/pelvis at [facility]  - Findings are suggestive of bilateral pyelonephritis - A small collection within the umbilical hernia with adjacent fatty stranding, suggestive of abscess - Clear lungs  8/8/24 office visit: recommended to follow up with endocrinology for alternate treatment of Ozempic.  09/24/24: CT of the abdomen and Pelvis from [facility] - Small hiatal hernia   She has been experiencing difficulty swallowing and presents today for clinical evaluation. She had been evaluated at [facility] 2 weeks ago for chest pain. CT imaging was obtained revealing a small hiatal hernia She had it when she ate or when she took her medications. She has been coughing a lot more and is non productive. She is not taking PPI therapies.

## 2024-09-26 NOTE — ASSESSMENT
[FreeTextEntry1] : Ms. NATIVIDAD MORA is a 54-year-old female who presents for a follow up appointment. Past medical history includes HLD, HTN, Hypothyroidism, OCD, LORA, type 2 diabetes mellitus, gastroesophageal reflux disease and hiatal hernia status post repair in 4/2023 with recurrence and symptoms of dysphagia.  09/24/24: CT of the abdomen and Pelvis from [facility] - Small hiatal hernia   She has been experiencing difficulty swallowing and presents today for clinical evaluation. She had been evaluated at [facility] 2 weeks ago for chest pain. CT imaging was obtained revealing a small hiatal hernia She had it when she ate or when she took her medications. She has been coughing a lot more and is non productive. She is not taking PPI therapies. Given her profound symptoms and recent imaging noting hiatal hernia an Endoscopy should be performed.   I have reviewed the patient's medical records and diagnostic images at time of this office consultation and have made the following recommendation: 1. Endoscopy     Preoperative checklist (Reviewed with patient in office) - Confirm allergies, including latex: PENICILLIN - Confirm pacemaker: NONE - Anticoagulation/antiplatelets noted and will be discontinued/continued: NONE - SGLT-2 Inhibitors (discontinued 3 days prior to surgery) or GLP-1 (discontinued 1 week prior to surgery): NONE - All other supplements, NSAIDs and fish oil were discussed and will be held one week before surgery     I, Dr. Lr personally performed the evaluation and management (E/M) services for this patient. That E/M includes conducting the initial examination, assessing all conditions, and establishing the plan of care. Today, Solitario Barker NP was here to observe my evaluation and management services for this patient.

## 2024-09-26 NOTE — HISTORY OF PRESENT ILLNESS
[FreeTextEntry1] : Ms. NATIVIDAD MORA is a 54-year-old female who presents for a follow up appointment. Past medical history includes HLD, HTN, Hypothyroidism, OCD, LORA, type 2 diabetes mellitus, gastroesophageal reflux disease and hiatal hernia status post repair in 4/2023 with recurrence and symptoms of dysphagia.  5/10/24 s/p Upper endoscopy with redo hiatal hernia repair, lysis of adhesion, Toupet fundoplication, and partial fundus resection   6/13/24: presented to [facility] ED with nausea, diarrhea, and abdominal pain. She subsequently had CT imaging noting development of possible abscess within the anterior abdominal wall hernia, for which she was started on antibiotics.  6/20 office visit - small area of fluctuation deep to the umbilical wound; PO antibiotics prescribed.  6/20-6/24: admitted for evaluation of abdominal collection/IV ABX. Physical exam consistent with umbilical hernia with fat necrosis. Afebrile with no leukocytosis, blood and urine cultures negative. No I&D indicated. Seen by ID, treated empirically with IV antibiotics. Upon review of HIE, patient noted to have positive urine culture (Klebsiella pneumoniae and Proteus mirabilis) from 6/6/2024, for which patient was discharged home on Ciprofloxacin 500mg q12h. Patient also noted to have elevated TSH 10.81, for which endocrinology recommended increasing dose of Synthroid to 175 mcg on discharge with outpatient follow up. Discharged 6/24.  6/21/24 CT abdomen/pelvis at [facility]  - Findings are suggestive of bilateral pyelonephritis - A small collection within the umbilical hernia with adjacent fatty stranding, suggestive of abscess - Clear lungs  8/8/24 office visit: recommended to follow up with endocrinology for alternate treatment of Ozempic.  09/24/24: CT of the abdomen and Pelvis from [facility] - Small hiatal hernia   She has been experiencing difficulty swallowing and presents today for clinical evaluation. She had been evaluated at [facility] 2 weeks ago for chest pain. CT imaging was obtained revealing a small hiatal hernia She had it when she ate or when she took her medications. She has been coughing a lot more and is non productive. She is not taking PPI therapies.

## 2024-09-26 NOTE — PHYSICAL EXAM
[Sclera] : the sclera and conjunctiva were normal [Neck Appearance] : the appearance of the neck was normal [Respiration, Rhythm And Depth] : normal respiratory rhythm and effort [Auscultation Breath Sounds / Voice Sounds] : lungs were clear to auscultation bilaterally [Heart Rate And Rhythm] : heart rate was normal and rhythm regular [Examination Of The Chest] : the chest was normal in appearance [Abnormal Walk] : normal gait [Bowel Sounds] : normal bowel sounds [Skin Color & Pigmentation] : normal skin color and pigmentation [Sensation] : the sensory exam was normal to light touch and pinprick [Oriented To Time, Place, And Person] : oriented to person, place, and time [Impaired Insight] : insight and judgment were intact

## 2024-09-27 ENCOUNTER — APPOINTMENT (OUTPATIENT)
Dept: ENDOCRINOLOGY | Facility: CLINIC | Age: 54
End: 2024-09-27
Payer: MEDICARE

## 2024-09-27 VITALS
TEMPERATURE: 95.5 F | BODY MASS INDEX: 35.08 KG/M2 | SYSTOLIC BLOOD PRESSURE: 130 MMHG | DIASTOLIC BLOOD PRESSURE: 76 MMHG | WEIGHT: 198 LBS | HEIGHT: 63 IN | OXYGEN SATURATION: 98 % | HEART RATE: 77 BPM

## 2024-09-27 LAB — GLUCOSE BLDC GLUCOMTR-MCNC: 234

## 2024-09-27 PROCEDURE — 99214 OFFICE O/P EST MOD 30 MIN: CPT

## 2024-09-27 RX ORDER — LEVOTHYROXINE SODIUM 0.14 MG/1
137 TABLET ORAL
Qty: 90 | Refills: 0 | Status: ACTIVE | COMMUNITY
Start: 2024-09-27 | End: 1900-01-01

## 2024-09-27 RX ORDER — METFORMIN ER 500 MG 500 MG/1
500 TABLET ORAL
Qty: 180 | Refills: 1 | Status: ACTIVE | COMMUNITY
Start: 2024-09-27 | End: 1900-01-01

## 2024-09-27 NOTE — ASSESSMENT
[FreeTextEntry1] : 1- T2DM  Plan:  - decreased metformin to 500 mg BID due to diarrhea. Pt was also asked to follow up with her gastroenterologist.    2- Hypothyroidism  Full weight base LT4 dose is 144 mcg Currently on Levothyroxine 175 mcg daily  Plan:  - Decrease levothyroxine to 137 mcg daily - check thyroid profile in 6-8 weeks    3- Obesity with BMI 39 on CPAP s/p cholecystectomy  - Discussed considering Zepbound for weight management. She had GI upset with Ozempic, so will avoid Wegovy. Following GI eval for diarrhea may start treatment as pt has been gaining more weight.   4- HLP  Plan:  - Atorvastatin 20    5- Bone health  No dairy consumption due to IBS Plan:  - Start Vit D 1000 IU daily   Follow up in 3 month

## 2024-09-27 NOTE — HISTORY OF PRESENT ILLNESS
[FreeTextEntry1] : 54 year old women with h/o obesity (BMI 39), LROA (uses CPAP), T2DM, hypothyroidism, HLD, IBS, GERD and HTN presented to establish care. Never followed up with Endocrinology before.   Labs  Sep 2024- TSH suppressed at 0.15, with normal free T4 and free T3, A1C 6%, Tg 102, LDL-C 59, Vit D 28.6 May 2024 are notable for TSH 4.5 with free T4 of 1.5, free T3 of 3.3, TPO negative, C-peptide of 5.3 with blood glucose of 122, A1c 6.1%, vitamin B12 at 456, eGFR 60, albumin to creatinine ratio of 4. Feb 2024- eGFR 61, A1C 6.2%, TSH 6.6, FT4  1.1, LDL-C 44, Tg 127  - T2DM  Dx'ed in her 30's Admits to not following diabetic diet, eats a lot sweats, pasta etc.  decreased metformin to 500 mg BID. Stopped Ozempic due to GI side effects (N/V diarrhea).  Stopped Glimepiride.  Reports FBG is usually in 130's, Occasionally has BG readings in 60 (feels light headed at those times).   Has peripheral neuropathy.  Follows up with nephrology, podiatry, and ophthalmology.   - Hypothyroidism  Following hospitalization and TSH reading at 10, Levothyroxine dose was increased to 175 mcg daily. Recently started taking levothyroxine correctly. Takes omeprazole at noon. No palpitation following dose increase   - Bone Health  Doesn't consume dairy due to IBS.  Doesn't take Vit D tablet .

## 2024-09-30 ENCOUNTER — APPOINTMENT (OUTPATIENT)
Dept: CARDIOLOGY | Facility: CLINIC | Age: 54
End: 2024-09-30
Payer: MEDICARE

## 2024-09-30 VITALS
DIASTOLIC BLOOD PRESSURE: 76 MMHG | BODY MASS INDEX: 33.83 KG/M2 | WEIGHT: 191 LBS | SYSTOLIC BLOOD PRESSURE: 144 MMHG | HEART RATE: 78 BPM | OXYGEN SATURATION: 99 %

## 2024-09-30 DIAGNOSIS — R07.89 OTHER CHEST PAIN: ICD-10-CM

## 2024-09-30 DIAGNOSIS — Z98.890 OTHER SPECIFIED POSTPROCEDURAL STATES: ICD-10-CM

## 2024-09-30 DIAGNOSIS — R94.39 ABNORMAL RESULT OF OTHER CARDIOVASCULAR FUNCTION STUDY: ICD-10-CM

## 2024-09-30 DIAGNOSIS — F32.9 MAJOR DEPRESSIVE DISORDER, SINGLE EPISODE, UNSPECIFIED: ICD-10-CM

## 2024-09-30 DIAGNOSIS — E78.5 HYPERLIPIDEMIA, UNSPECIFIED: ICD-10-CM

## 2024-09-30 DIAGNOSIS — I10 ESSENTIAL (PRIMARY) HYPERTENSION: ICD-10-CM

## 2024-09-30 DIAGNOSIS — E03.9 HYPOTHYROIDISM, UNSPECIFIED: ICD-10-CM

## 2024-09-30 DIAGNOSIS — Z87.440 PERSONAL HISTORY OF URINARY (TRACT) INFECTIONS: ICD-10-CM

## 2024-09-30 DIAGNOSIS — F10.10 ALCOHOL ABUSE, UNCOMPLICATED: ICD-10-CM

## 2024-09-30 DIAGNOSIS — K21.9 GASTRO-ESOPHAGEAL REFLUX DISEASE W/OUT ESOPHAGITIS: ICD-10-CM

## 2024-09-30 DIAGNOSIS — G47.33 OBSTRUCTIVE SLEEP APNEA (ADULT) (PEDIATRIC): ICD-10-CM

## 2024-09-30 DIAGNOSIS — K31.84 GASTROPARESIS: ICD-10-CM

## 2024-09-30 DIAGNOSIS — R35.0 FREQUENCY OF MICTURITION: ICD-10-CM

## 2024-09-30 DIAGNOSIS — R13.10 DYSPHAGIA, UNSPECIFIED: ICD-10-CM

## 2024-09-30 DIAGNOSIS — Z87.19 OTHER SPECIFIED POSTPROCEDURAL STATES: ICD-10-CM

## 2024-09-30 DIAGNOSIS — K44.9 DIAPHRAGMATIC HERNIA W/OUT OBSTRUCTION OR GANGRENE: ICD-10-CM

## 2024-09-30 DIAGNOSIS — F19.11 OTHER PSYCHOACTIVE SUBSTANCE ABUSE, IN REMISSION: ICD-10-CM

## 2024-09-30 DIAGNOSIS — R15.9 FULL INCONTINENCE OF FECES: ICD-10-CM

## 2024-09-30 DIAGNOSIS — F42.9 OBSESSIVE-COMPULSIVE DISORDER, UNSPECIFIED: ICD-10-CM

## 2024-09-30 DIAGNOSIS — I42.9 CARDIOMYOPATHY, UNSPECIFIED: ICD-10-CM

## 2024-09-30 DIAGNOSIS — E11.9 TYPE 2 DIABETES MELLITUS W/OUT COMPLICATIONS: ICD-10-CM

## 2024-09-30 PROCEDURE — 99215 OFFICE O/P EST HI 40 MIN: CPT

## 2024-09-30 PROCEDURE — G2211 COMPLEX E/M VISIT ADD ON: CPT

## 2024-09-30 NOTE — PHYSICAL EXAM
[Well Developed] : well developed [Well Nourished] : well nourished [Obese] : obese [No Carotid Bruit] : no carotid bruit [Normal S1, S2] : normal S1, S2 [No Murmur] : no murmur [Clear Lung Fields] : clear lung fields [Good Air Entry] : good air entry [Normal Gait] : normal gait [No Edema] : no edema [Moves all extremities] : moves all extremities [Alert and Oriented] : alert and oriented

## 2024-09-30 NOTE — DISCUSSION/SUMMARY
[FreeTextEntry1] : NATIVIDAD MORA is a 54 year old F who presents today Sep 30, 2024 with the above history and the following active issues:   Atypical Chest Pain: Likely noncardiac in nature. CTA Heart Coronaries showed no evidence of CAD or stenosis. NT-proBNP <10, negative troponins.  Pt was reassured regarding her cardiovascular status. She is compliant with lipid lowering therapy.   HLD: LDL was 59 in September 2024. Continue Atorvastatin.  HTN: BP uncontrolled at 144/76. She takes Amlodipine 10mg and Irbesartan 150mg daily. Recommended that patient start Metoprolol Succinate ER 25mg daily.  F/u OV for BP check in 2 weeks.   Syncope: >2 months ago, no recurrence of sx. Likely vasovagal syncope. She denies dizziness/lightheadedness, denies palpitations.  Would consider Holter monitoring in the future if she were to have any sx.   LORA on CPAP: compliant with CPAP. Ongoing f/u with PCP.   DM: A1C was 6% in September 2024. On Metformin. Ongoing f/u with PCP, Endocrine.   Hypothyroid: low TSH, normal T4 on most recent labs. On Levothyroxine. Ongoing f/u with PCP, Endocrine.   Depression/Anxiety, prior hx of EtOH/Cocaine abuse: in recovery, abstinent from alcohol/illicit drugs currently. On Wellbutrin, Fluvoxamine. Ongoing f/u with PCP.   Obesity: she has lost 7 lbs since September 2024. Dietary changes to reduce saturated fat intake and other measures to reduce cholesterol have been discussed. Reduction of total calories and carbohydrates. Patient was educated on low sodium diet and avoidance of excessive alcohol. Recommended that patient try and follow active lifestyle with regular cardiovascular exercise.   Limitations of non-invasive testing reviewed. Red flag symptoms which would warrant sooner emergent evaluation reviewed with the patient. All questions and concerns were addressed and answered.   Sincerely,   Cora Waite, [facility] Patient's history, testing, and plan reviewed with supervising MD: Dr. Gregorio Stephenson

## 2024-10-01 ENCOUNTER — NON-APPOINTMENT (OUTPATIENT)
Age: 54
End: 2024-10-01

## 2024-10-01 ENCOUNTER — APPOINTMENT (OUTPATIENT)
Dept: OPHTHALMOLOGY | Facility: CLINIC | Age: 54
End: 2024-10-01
Payer: MEDICARE

## 2024-10-01 PROCEDURE — 92134 CPTRZ OPH DX IMG PST SGM RTA: CPT

## 2024-10-01 PROCEDURE — 92014 COMPRE OPH EXAM EST PT 1/>: CPT

## 2024-10-01 RX ORDER — METOPROLOL SUCCINATE 25 MG/1
25 TABLET, EXTENDED RELEASE ORAL
Qty: 90 | Refills: 1 | Status: ACTIVE | COMMUNITY
Start: 2024-10-01 | End: 1900-01-01

## 2024-10-10 ENCOUNTER — APPOINTMENT (OUTPATIENT)
Dept: NEPHROLOGY | Facility: CLINIC | Age: 54
End: 2024-10-10
Payer: MEDICARE

## 2024-10-10 VITALS
HEIGHT: 63 IN | WEIGHT: 191 LBS | DIASTOLIC BLOOD PRESSURE: 64 MMHG | HEART RATE: 64 BPM | OXYGEN SATURATION: 98 % | RESPIRATION RATE: 16 BRPM | SYSTOLIC BLOOD PRESSURE: 124 MMHG | BODY MASS INDEX: 33.84 KG/M2

## 2024-10-10 PROCEDURE — 99215 OFFICE O/P EST HI 40 MIN: CPT

## 2024-10-21 ENCOUNTER — APPOINTMENT (OUTPATIENT)
Dept: CARDIOLOGY | Facility: CLINIC | Age: 54
End: 2024-10-21
Payer: MEDICARE

## 2024-10-21 VITALS
DIASTOLIC BLOOD PRESSURE: 70 MMHG | WEIGHT: 194 LBS | BODY MASS INDEX: 34.37 KG/M2 | OXYGEN SATURATION: 99 % | SYSTOLIC BLOOD PRESSURE: 130 MMHG | HEART RATE: 57 BPM

## 2024-10-21 DIAGNOSIS — Z87.19 OTHER SPECIFIED POSTPROCEDURAL STATES: ICD-10-CM

## 2024-10-21 DIAGNOSIS — R07.89 OTHER CHEST PAIN: ICD-10-CM

## 2024-10-21 DIAGNOSIS — E11.9 TYPE 2 DIABETES MELLITUS W/OUT COMPLICATIONS: ICD-10-CM

## 2024-10-21 DIAGNOSIS — R13.10 DYSPHAGIA, UNSPECIFIED: ICD-10-CM

## 2024-10-21 DIAGNOSIS — E78.5 HYPERLIPIDEMIA, UNSPECIFIED: ICD-10-CM

## 2024-10-21 DIAGNOSIS — Z98.890 OTHER SPECIFIED POSTPROCEDURAL STATES: ICD-10-CM

## 2024-10-21 DIAGNOSIS — K44.9 DIAPHRAGMATIC HERNIA W/OUT OBSTRUCTION OR GANGRENE: ICD-10-CM

## 2024-10-21 DIAGNOSIS — K21.9 GASTRO-ESOPHAGEAL REFLUX DISEASE W/OUT ESOPHAGITIS: ICD-10-CM

## 2024-10-21 DIAGNOSIS — R94.39 ABNORMAL RESULT OF OTHER CARDIOVASCULAR FUNCTION STUDY: ICD-10-CM

## 2024-10-21 DIAGNOSIS — I42.9 CARDIOMYOPATHY, UNSPECIFIED: ICD-10-CM

## 2024-10-21 DIAGNOSIS — Z87.440 PERSONAL HISTORY OF URINARY (TRACT) INFECTIONS: ICD-10-CM

## 2024-10-21 DIAGNOSIS — K31.84 GASTROPARESIS: ICD-10-CM

## 2024-10-21 DIAGNOSIS — F32.9 MAJOR DEPRESSIVE DISORDER, SINGLE EPISODE, UNSPECIFIED: ICD-10-CM

## 2024-10-21 DIAGNOSIS — Z78.9 OTHER SPECIFIED HEALTH STATUS: ICD-10-CM

## 2024-10-21 DIAGNOSIS — R35.0 FREQUENCY OF MICTURITION: ICD-10-CM

## 2024-10-21 DIAGNOSIS — E03.9 HYPOTHYROIDISM, UNSPECIFIED: ICD-10-CM

## 2024-10-21 DIAGNOSIS — F10.10 ALCOHOL ABUSE, UNCOMPLICATED: ICD-10-CM

## 2024-10-21 DIAGNOSIS — F19.11 OTHER PSYCHOACTIVE SUBSTANCE ABUSE, IN REMISSION: ICD-10-CM

## 2024-10-21 DIAGNOSIS — R15.9 FULL INCONTINENCE OF FECES: ICD-10-CM

## 2024-10-21 DIAGNOSIS — I10 ESSENTIAL (PRIMARY) HYPERTENSION: ICD-10-CM

## 2024-10-21 PROCEDURE — G2211 COMPLEX E/M VISIT ADD ON: CPT

## 2024-10-21 PROCEDURE — 99213 OFFICE O/P EST LOW 20 MIN: CPT

## 2024-10-24 ENCOUNTER — NON-APPOINTMENT (OUTPATIENT)
Age: 54
End: 2024-10-24

## 2024-10-24 ENCOUNTER — APPOINTMENT (OUTPATIENT)
Dept: OPHTHALMOLOGY | Facility: CLINIC | Age: 54
End: 2024-10-24
Payer: SELF-PAY

## 2024-10-24 PROCEDURE — 92015 DETERMINE REFRACTIVE STATE: CPT

## 2024-11-04 ENCOUNTER — APPOINTMENT (OUTPATIENT)
Dept: CARDIOLOGY | Facility: CLINIC | Age: 54
End: 2024-11-04

## 2024-11-06 ENCOUNTER — RESULT REVIEW (OUTPATIENT)
Age: 54
End: 2024-11-06

## 2024-11-06 ENCOUNTER — OUTPATIENT (OUTPATIENT)
Dept: OUTPATIENT SERVICES | Facility: HOSPITAL | Age: 54
LOS: 1 days | End: 2024-11-06
Payer: MEDICARE

## 2024-11-06 ENCOUNTER — APPOINTMENT (OUTPATIENT)
Dept: THORACIC SURGERY | Facility: HOSPITAL | Age: 54
End: 2024-11-06

## 2024-11-06 ENCOUNTER — TRANSCRIPTION ENCOUNTER (OUTPATIENT)
Age: 54
End: 2024-11-06

## 2024-11-06 DIAGNOSIS — K44.9 DIAPHRAGMATIC HERNIA WITHOUT OBSTRUCTION OR GANGRENE: ICD-10-CM

## 2024-11-06 DIAGNOSIS — Z98.890 OTHER SPECIFIED POSTPROCEDURAL STATES: Chronic | ICD-10-CM

## 2024-11-06 DIAGNOSIS — Z90.49 ACQUIRED ABSENCE OF OTHER SPECIFIED PARTS OF DIGESTIVE TRACT: Chronic | ICD-10-CM

## 2024-11-06 DIAGNOSIS — Z96.82 PRESENCE OF NEUROSTIMULATOR: Chronic | ICD-10-CM

## 2024-11-06 LAB — GLUCOSE BLDC GLUCOMTR-MCNC: 153 MG/DL — HIGH (ref 70–99)

## 2024-11-06 PROCEDURE — 88305 TISSUE EXAM BY PATHOLOGIST: CPT | Mod: 26

## 2024-11-06 PROCEDURE — 88342 IMHCHEM/IMCYTCHM 1ST ANTB: CPT | Mod: 26

## 2024-11-06 PROCEDURE — 43200 ESOPHAGOSCOPY FLEXIBLE BRUSH: CPT

## 2024-11-06 DEVICE — KIT A-LINE 1LUM 20G X 12CM SAFE KIT: Type: IMPLANTABLE DEVICE | Status: FUNCTIONAL

## 2024-11-06 NOTE — BRIEF OPERATIVE NOTE - NSICDXBRIEFPROCEDURE_GEN_ALL_CORE_FT
PROCEDURES:  EGD with biopsy 06-Nov-2024 13:03:12 biopsy antrum, GE junction, mid esophagus Jeannie Lovell

## 2024-11-07 ENCOUNTER — APPOINTMENT (OUTPATIENT)
Dept: UROGYNECOLOGY | Facility: CLINIC | Age: 54
End: 2024-11-07

## 2024-11-07 VITALS
DIASTOLIC BLOOD PRESSURE: 80 MMHG | BODY MASS INDEX: 34.38 KG/M2 | SYSTOLIC BLOOD PRESSURE: 132 MMHG | HEIGHT: 63 IN | WEIGHT: 194 LBS

## 2024-11-07 PROCEDURE — 99213 OFFICE O/P EST LOW 20 MIN: CPT

## 2024-11-07 PROCEDURE — 81003 URINALYSIS AUTO W/O SCOPE: CPT | Mod: QW

## 2024-11-08 LAB
BILIRUB UR QL STRIP: NEGATIVE
CLARITY UR: CLEAR
COLLECTION METHOD: NORMAL
GLUCOSE UR-MCNC: NEGATIVE
HCG UR QL: 0.2 EU/DL
HGB UR QL STRIP.AUTO: NEGATIVE
KETONES UR-MCNC: NEGATIVE
LEUKOCYTE ESTERASE UR QL STRIP: NEGATIVE
NITRITE UR QL STRIP: NEGATIVE
PH UR STRIP: 5.5
PROT UR STRIP-MCNC: NEGATIVE
SP GR UR STRIP: 1.02
SURGICAL PATHOLOGY STUDY: SIGNIFICANT CHANGE UP

## 2024-11-11 LAB — BACTERIA UR CULT: NORMAL

## 2024-11-14 ENCOUNTER — APPOINTMENT (OUTPATIENT)
Dept: THORACIC SURGERY | Facility: CLINIC | Age: 54
End: 2024-11-14

## 2024-11-20 PROCEDURE — 88305 TISSUE EXAM BY PATHOLOGIST: CPT

## 2024-11-20 PROCEDURE — 88342 IMHCHEM/IMCYTCHM 1ST ANTB: CPT

## 2024-11-20 PROCEDURE — 43239 EGD BIOPSY SINGLE/MULTIPLE: CPT

## 2024-11-20 PROCEDURE — 82962 GLUCOSE BLOOD TEST: CPT

## 2024-11-21 ENCOUNTER — APPOINTMENT (OUTPATIENT)
Dept: THORACIC SURGERY | Facility: CLINIC | Age: 54
End: 2024-11-21
Payer: MEDICARE

## 2024-11-21 VITALS
DIASTOLIC BLOOD PRESSURE: 78 MMHG | OXYGEN SATURATION: 99 % | RESPIRATION RATE: 16 BRPM | HEART RATE: 57 BPM | SYSTOLIC BLOOD PRESSURE: 145 MMHG

## 2024-11-21 DIAGNOSIS — K44.9 DIAPHRAGMATIC HERNIA W/OUT OBSTRUCTION OR GANGRENE: ICD-10-CM

## 2024-11-21 PROCEDURE — 99213 OFFICE O/P EST LOW 20 MIN: CPT

## 2024-11-27 ENCOUNTER — APPOINTMENT (OUTPATIENT)
Dept: ORTHOPEDIC SURGERY | Facility: CLINIC | Age: 54
End: 2024-11-27
Payer: MEDICARE

## 2024-11-27 VITALS
SYSTOLIC BLOOD PRESSURE: 132 MMHG | WEIGHT: 194 LBS | HEIGHT: 63 IN | BODY MASS INDEX: 34.38 KG/M2 | HEART RATE: 76 BPM | DIASTOLIC BLOOD PRESSURE: 79 MMHG

## 2024-11-27 DIAGNOSIS — M17.11 UNILATERAL PRIMARY OSTEOARTHRITIS, RIGHT KNEE: ICD-10-CM

## 2024-11-27 PROCEDURE — 99214 OFFICE O/P EST MOD 30 MIN: CPT | Mod: 25

## 2024-11-27 PROCEDURE — 20610 DRAIN/INJ JOINT/BURSA W/O US: CPT | Mod: RT

## 2025-01-02 ENCOUNTER — NON-APPOINTMENT (OUTPATIENT)
Age: 55
End: 2025-01-02

## 2025-01-06 ENCOUNTER — APPOINTMENT (OUTPATIENT)
Dept: ENDOCRINOLOGY | Facility: CLINIC | Age: 55
End: 2025-01-06
Payer: MEDICARE

## 2025-01-06 VITALS
HEIGHT: 63 IN | SYSTOLIC BLOOD PRESSURE: 138 MMHG | WEIGHT: 200 LBS | BODY MASS INDEX: 35.44 KG/M2 | DIASTOLIC BLOOD PRESSURE: 86 MMHG | TEMPERATURE: 96.8 F | HEART RATE: 70 BPM | OXYGEN SATURATION: 99 %

## 2025-01-06 DIAGNOSIS — E55.9 VITAMIN D DEFICIENCY, UNSPECIFIED: ICD-10-CM

## 2025-01-06 DIAGNOSIS — E03.9 HYPOTHYROIDISM, UNSPECIFIED: ICD-10-CM

## 2025-01-06 DIAGNOSIS — R73.9 HYPERGLYCEMIA, UNSPECIFIED: ICD-10-CM

## 2025-01-06 DIAGNOSIS — E11.9 TYPE 2 DIABETES MELLITUS W/OUT COMPLICATIONS: ICD-10-CM

## 2025-01-06 DIAGNOSIS — E78.5 HYPERLIPIDEMIA, UNSPECIFIED: ICD-10-CM

## 2025-01-06 LAB — GLUCOSE BLDC GLUCOMTR-MCNC: 178

## 2025-01-06 PROCEDURE — 99214 OFFICE O/P EST MOD 30 MIN: CPT

## 2025-01-06 PROCEDURE — 82962 GLUCOSE BLOOD TEST: CPT

## 2025-01-28 ENCOUNTER — NON-APPOINTMENT (OUTPATIENT)
Age: 55
End: 2025-01-28

## 2025-01-28 DIAGNOSIS — K21.9 GASTRO-ESOPHAGEAL REFLUX DISEASE W/OUT ESOPHAGITIS: ICD-10-CM

## 2025-02-06 ENCOUNTER — APPOINTMENT (OUTPATIENT)
Dept: THORACIC SURGERY | Facility: CLINIC | Age: 55
End: 2025-02-06
Payer: MEDICARE

## 2025-02-06 VITALS
DIASTOLIC BLOOD PRESSURE: 84 MMHG | TEMPERATURE: 98 F | SYSTOLIC BLOOD PRESSURE: 128 MMHG | HEIGHT: 63 IN | BODY MASS INDEX: 35.44 KG/M2 | HEART RATE: 60 BPM | RESPIRATION RATE: 16 BRPM | OXYGEN SATURATION: 97 % | WEIGHT: 200 LBS

## 2025-02-06 DIAGNOSIS — Z87.19 PERSONAL HISTORY OF OTHER DISEASES OF THE DIGESTIVE SYSTEM: ICD-10-CM

## 2025-02-06 DIAGNOSIS — Z98.890 OTHER SPECIFIED POSTPROCEDURAL STATES: ICD-10-CM

## 2025-02-06 DIAGNOSIS — Z87.19 OTHER SPECIFIED POSTPROCEDURAL STATES: ICD-10-CM

## 2025-02-06 PROCEDURE — 99213 OFFICE O/P EST LOW 20 MIN: CPT

## 2025-02-06 RX ORDER — LURASIDONE HYDROCHLORIDE 40 MG/1
40 TABLET, FILM COATED ORAL
Refills: 0 | Status: ACTIVE | COMMUNITY

## 2025-02-06 RX ORDER — LIPASE/PROTEASE/AMYLASE 497MG
CAPSULE,DELAYED RELEASE (ENTERIC COATED) ORAL
Refills: 0 | Status: ACTIVE | COMMUNITY

## 2025-02-07 ENCOUNTER — APPOINTMENT (OUTPATIENT)
Dept: UROGYNECOLOGY | Facility: CLINIC | Age: 55
End: 2025-02-07

## 2025-02-07 DIAGNOSIS — N39.46 MIXED INCONTINENCE: ICD-10-CM

## 2025-02-07 DIAGNOSIS — N39.3 STRESS INCONTINENCE (FEMALE) (MALE): ICD-10-CM

## 2025-02-07 DIAGNOSIS — R15.9 FULL INCONTINENCE OF FECES: ICD-10-CM

## 2025-02-07 DIAGNOSIS — R35.0 FREQUENCY OF MICTURITION: ICD-10-CM

## 2025-02-07 DIAGNOSIS — N32.81 OVERACTIVE BLADDER: ICD-10-CM

## 2025-02-07 PROCEDURE — 99214 OFFICE O/P EST MOD 30 MIN: CPT

## 2025-02-07 PROCEDURE — 51798 US URINE CAPACITY MEASURE: CPT

## 2025-02-10 LAB
APPEARANCE: CLEAR
BACTERIA UR CULT: NORMAL
BACTERIA: NEGATIVE /HPF
BILIRUBIN URINE: NEGATIVE
BLOOD URINE: NEGATIVE
CALCIUM OXALATE CRYSTALS: PRESENT
CAST: 0 /LPF
COLOR: YELLOW
EPITHELIAL CELLS: 2 /HPF
GLUCOSE QUALITATIVE U: NEGATIVE
KETONES URINE: NEGATIVE
LEUKOCYTE ESTERASE URINE: NEGATIVE
MICROSCOPIC-UA: NORMAL
NITRITE URINE: NEGATIVE
PH URINE: 6
PROTEIN URINE: NORMAL
RED BLOOD CELLS URINE: 2 /HPF
REVIEW: NORMAL
SPECIFIC GRAVITY URINE: >=1.03
UROBILINOGEN URINE: NORMAL
WHITE BLOOD CELLS URINE: 0 /HPF

## 2025-02-26 DIAGNOSIS — M25.561 PAIN IN RIGHT KNEE: ICD-10-CM

## 2025-02-27 ENCOUNTER — APPOINTMENT (OUTPATIENT)
Dept: ORTHOPEDIC SURGERY | Facility: CLINIC | Age: 55
End: 2025-02-27
Payer: MEDICARE

## 2025-02-27 PROCEDURE — 99213 OFFICE O/P EST LOW 20 MIN: CPT | Mod: 25

## 2025-02-27 PROCEDURE — 20610 DRAIN/INJ JOINT/BURSA W/O US: CPT | Mod: RT

## 2025-02-27 RX ORDER — HYALURONATE SODIUM 20 MG/2 ML
20 SYRINGE (ML) INTRAARTICULAR
Qty: 1 | Refills: 0 | Status: ACTIVE | COMMUNITY
Start: 2025-02-27

## 2025-03-05 ENCOUNTER — APPOINTMENT (OUTPATIENT)
Dept: ORTHOPEDIC SURGERY | Facility: CLINIC | Age: 55
End: 2025-03-05
Payer: MEDICARE

## 2025-03-05 PROCEDURE — 20610 DRAIN/INJ JOINT/BURSA W/O US: CPT | Mod: RT

## 2025-03-06 ENCOUNTER — APPOINTMENT (OUTPATIENT)
Dept: RADIOLOGY | Facility: CLINIC | Age: 55
End: 2025-03-06
Payer: MEDICARE

## 2025-03-06 PROCEDURE — 72220 X-RAY EXAM SACRUM TAILBONE: CPT

## 2025-03-13 ENCOUNTER — APPOINTMENT (OUTPATIENT)
Dept: ORTHOPEDIC SURGERY | Facility: CLINIC | Age: 55
End: 2025-03-13
Payer: MEDICARE

## 2025-03-13 ENCOUNTER — APPOINTMENT (OUTPATIENT)
Dept: UROGYNECOLOGY | Facility: CLINIC | Age: 55
End: 2025-03-13

## 2025-03-13 VITALS
WEIGHT: 200 LBS | BODY MASS INDEX: 35.44 KG/M2 | SYSTOLIC BLOOD PRESSURE: 124 MMHG | HEIGHT: 63 IN | DIASTOLIC BLOOD PRESSURE: 80 MMHG

## 2025-03-13 DIAGNOSIS — M17.11 UNILATERAL PRIMARY OSTEOARTHRITIS, RIGHT KNEE: ICD-10-CM

## 2025-03-13 DIAGNOSIS — N39.46 MIXED INCONTINENCE: ICD-10-CM

## 2025-03-13 DIAGNOSIS — R35.0 FREQUENCY OF MICTURITION: ICD-10-CM

## 2025-03-13 DIAGNOSIS — N39.3 STRESS INCONTINENCE (FEMALE) (MALE): ICD-10-CM

## 2025-03-13 PROCEDURE — 99213 OFFICE O/P EST LOW 20 MIN: CPT

## 2025-03-13 PROCEDURE — 20610 DRAIN/INJ JOINT/BURSA W/O US: CPT | Mod: RT

## 2025-03-20 ENCOUNTER — NON-APPOINTMENT (OUTPATIENT)
Age: 55
End: 2025-03-20

## 2025-03-22 ENCOUNTER — NON-APPOINTMENT (OUTPATIENT)
Age: 55
End: 2025-03-22

## 2025-03-26 NOTE — COUNSELING
- Uncontrolled, which she attributes to nerves. BP labile during visit today  - Will have her return in 3-4 weeks for a nurse visit for BP check  - Continue carvedilol 12.5 mg BID, lisinopril 40 mg daily and hydrochlorothiazide 25 mg daily   [Hygeine (Including Daily Shower)] : hygeine (including daily shower) [Importance of Regular Medical Follow-Up] : the importance of regular medical follow-up [No Heavy Lifting] : no heavy lifting (>15-20 lb. for 1 month or 25 lb. for 3 months from date of surgery) [Blood Pressure Control] : blood pressure control [S/S of infection] : signs and symptoms of infection (and to whom it should be reported) [Progressive Ambulation/Activity] : progressive ambulation/activity [Medication/Vitamin/Herb/Food Interaction] : medication/vitamin/herb/food interaction

## 2025-03-31 ENCOUNTER — NON-APPOINTMENT (OUTPATIENT)
Age: 55
End: 2025-03-31

## 2025-04-08 ENCOUNTER — APPOINTMENT (OUTPATIENT)
Dept: ENDOCRINOLOGY | Facility: CLINIC | Age: 55
End: 2025-04-08
Payer: MEDICARE

## 2025-04-08 VITALS
TEMPERATURE: 98 F | HEART RATE: 65 BPM | WEIGHT: 205 LBS | DIASTOLIC BLOOD PRESSURE: 80 MMHG | BODY MASS INDEX: 35.87 KG/M2 | HEIGHT: 63.5 IN | SYSTOLIC BLOOD PRESSURE: 120 MMHG | OXYGEN SATURATION: 98 % | RESPIRATION RATE: 16 BRPM

## 2025-04-08 DIAGNOSIS — E78.5 HYPERLIPIDEMIA, UNSPECIFIED: ICD-10-CM

## 2025-04-08 DIAGNOSIS — E03.9 HYPOTHYROIDISM, UNSPECIFIED: ICD-10-CM

## 2025-04-08 DIAGNOSIS — E11.9 TYPE 2 DIABETES MELLITUS W/OUT COMPLICATIONS: ICD-10-CM

## 2025-04-08 DIAGNOSIS — R73.09 OTHER ABNORMAL GLUCOSE: ICD-10-CM

## 2025-04-08 LAB — GLUCOSE BLDC GLUCOMTR-MCNC: 118

## 2025-04-08 PROCEDURE — 99214 OFFICE O/P EST MOD 30 MIN: CPT

## 2025-04-08 PROCEDURE — 82962 GLUCOSE BLOOD TEST: CPT

## 2025-04-08 RX ORDER — METFORMIN ER 500 MG 500 MG/1
500 TABLET ORAL
Qty: 180 | Refills: 1 | Status: ACTIVE | COMMUNITY
Start: 2025-04-08 | End: 1900-01-01

## 2025-04-08 RX ORDER — LEVOTHYROXINE SODIUM 0.11 MG/1
112 TABLET ORAL DAILY
Qty: 90 | Refills: 1 | Status: ACTIVE | COMMUNITY
Start: 2025-04-08 | End: 1900-01-01

## 2025-04-10 ENCOUNTER — APPOINTMENT (OUTPATIENT)
Dept: NEPHROLOGY | Facility: CLINIC | Age: 55
End: 2025-04-10

## 2025-04-10 VITALS
HEIGHT: 63.5 IN | RESPIRATION RATE: 16 BRPM | WEIGHT: 200 LBS | HEART RATE: 77 BPM | OXYGEN SATURATION: 98 % | SYSTOLIC BLOOD PRESSURE: 102 MMHG | DIASTOLIC BLOOD PRESSURE: 60 MMHG | BODY MASS INDEX: 35 KG/M2

## 2025-04-10 DIAGNOSIS — I10 ESSENTIAL (PRIMARY) HYPERTENSION: ICD-10-CM

## 2025-04-10 PROCEDURE — 99215 OFFICE O/P EST HI 40 MIN: CPT

## 2025-05-12 ENCOUNTER — NON-APPOINTMENT (OUTPATIENT)
Age: 55
End: 2025-05-12

## 2025-05-13 ENCOUNTER — NON-APPOINTMENT (OUTPATIENT)
Age: 55
End: 2025-05-13

## 2025-05-13 ENCOUNTER — APPOINTMENT (OUTPATIENT)
Dept: CARDIOLOGY | Facility: CLINIC | Age: 55
End: 2025-05-13
Payer: MEDICARE

## 2025-05-13 VITALS
HEART RATE: 67 BPM | BODY MASS INDEX: 34.3 KG/M2 | WEIGHT: 196 LBS | OXYGEN SATURATION: 98 % | HEIGHT: 63.5 IN | SYSTOLIC BLOOD PRESSURE: 118 MMHG | RESPIRATION RATE: 14 BRPM | DIASTOLIC BLOOD PRESSURE: 80 MMHG

## 2025-05-13 DIAGNOSIS — I10 ESSENTIAL (PRIMARY) HYPERTENSION: ICD-10-CM

## 2025-05-13 DIAGNOSIS — R06.09 OTHER FORMS OF DYSPNEA: ICD-10-CM

## 2025-05-13 DIAGNOSIS — E11.9 TYPE 2 DIABETES MELLITUS W/OUT COMPLICATIONS: ICD-10-CM

## 2025-05-13 DIAGNOSIS — R07.89 OTHER CHEST PAIN: ICD-10-CM

## 2025-05-13 DIAGNOSIS — G47.33 OBSTRUCTIVE SLEEP APNEA (ADULT) (PEDIATRIC): ICD-10-CM

## 2025-05-13 DIAGNOSIS — E78.5 HYPERLIPIDEMIA, UNSPECIFIED: ICD-10-CM

## 2025-05-13 DIAGNOSIS — R13.10 DYSPHAGIA, UNSPECIFIED: ICD-10-CM

## 2025-05-13 DIAGNOSIS — E66.9 OBESITY, UNSPECIFIED: ICD-10-CM

## 2025-05-13 PROCEDURE — 99214 OFFICE O/P EST MOD 30 MIN: CPT

## 2025-05-13 PROCEDURE — 93000 ELECTROCARDIOGRAM COMPLETE: CPT

## 2025-05-13 RX ORDER — ORAL SEMAGLUTIDE 3 MG/1
3 TABLET ORAL DAILY
Refills: 0 | Status: ACTIVE | COMMUNITY

## 2025-05-13 RX ORDER — BREXPIPRAZOLE 1 MG/1
1 TABLET ORAL DAILY
Refills: 0 | Status: ACTIVE | COMMUNITY

## 2025-06-18 ENCOUNTER — APPOINTMENT (OUTPATIENT)
Dept: COLORECTAL SURGERY | Facility: CLINIC | Age: 55
End: 2025-06-18
Payer: MEDICARE

## 2025-06-18 VITALS
BODY MASS INDEX: 34.12 KG/M2 | HEIGHT: 63.5 IN | RESPIRATION RATE: 15 BRPM | SYSTOLIC BLOOD PRESSURE: 136 MMHG | DIASTOLIC BLOOD PRESSURE: 78 MMHG | HEART RATE: 60 BPM | WEIGHT: 195 LBS | OXYGEN SATURATION: 98 %

## 2025-06-18 PROCEDURE — 99204 OFFICE O/P NEW MOD 45 MIN: CPT | Mod: 25

## 2025-06-18 PROCEDURE — 76872 US TRANSRECTAL: CPT

## 2025-06-18 PROCEDURE — 91122 ANORECTAL MANOMETRY: CPT

## 2025-06-18 RX ORDER — LOPERAMIDE HCL 2 MG/1
2 TABLET, FILM COATED ORAL DAILY
Qty: 30 | Refills: 6 | Status: ACTIVE | COMMUNITY
Start: 2025-06-18 | End: 1900-01-01

## 2025-06-19 ENCOUNTER — APPOINTMENT (OUTPATIENT)
Facility: CLINIC | Age: 55
End: 2025-06-19
Payer: MEDICARE

## 2025-06-19 VITALS
OXYGEN SATURATION: 97 % | WEIGHT: 199 LBS | DIASTOLIC BLOOD PRESSURE: 70 MMHG | SYSTOLIC BLOOD PRESSURE: 120 MMHG | HEART RATE: 63 BPM | HEIGHT: 63.5 IN | BODY MASS INDEX: 34.82 KG/M2

## 2025-06-19 PROCEDURE — 99214 OFFICE O/P EST MOD 30 MIN: CPT

## 2025-06-23 ENCOUNTER — OUTPATIENT (OUTPATIENT)
Dept: OUTPATIENT SERVICES | Facility: HOSPITAL | Age: 55
LOS: 1 days | End: 2025-06-23
Payer: MEDICARE

## 2025-06-23 VITALS
HEIGHT: 63.5 IN | DIASTOLIC BLOOD PRESSURE: 70 MMHG | WEIGHT: 200.62 LBS | OXYGEN SATURATION: 99 % | TEMPERATURE: 97 F | HEART RATE: 72 BPM | SYSTOLIC BLOOD PRESSURE: 120 MMHG | RESPIRATION RATE: 16 BRPM

## 2025-06-23 DIAGNOSIS — Z01.818 ENCOUNTER FOR OTHER PREPROCEDURAL EXAMINATION: ICD-10-CM

## 2025-06-23 DIAGNOSIS — K43.2 INCISIONAL HERNIA WITHOUT OBSTRUCTION OR GANGRENE: ICD-10-CM

## 2025-06-23 DIAGNOSIS — I10 ESSENTIAL (PRIMARY) HYPERTENSION: ICD-10-CM

## 2025-06-23 DIAGNOSIS — Z29.9 ENCOUNTER FOR PROPHYLACTIC MEASURES, UNSPECIFIED: ICD-10-CM

## 2025-06-23 DIAGNOSIS — Z98.890 OTHER SPECIFIED POSTPROCEDURAL STATES: Chronic | ICD-10-CM

## 2025-06-23 DIAGNOSIS — Z96.82 PRESENCE OF NEUROSTIMULATOR: Chronic | ICD-10-CM

## 2025-06-23 DIAGNOSIS — E11.9 TYPE 2 DIABETES MELLITUS WITHOUT COMPLICATIONS: ICD-10-CM

## 2025-06-23 DIAGNOSIS — R13.10 DYSPHAGIA, UNSPECIFIED: ICD-10-CM

## 2025-06-23 DIAGNOSIS — Z90.49 ACQUIRED ABSENCE OF OTHER SPECIFIED PARTS OF DIGESTIVE TRACT: Chronic | ICD-10-CM

## 2025-06-23 LAB
A1C WITH ESTIMATED AVERAGE GLUCOSE RESULT: 5.9 % — HIGH (ref 4–5.6)
ALBUMIN SERPL ELPH-MCNC: 3.9 G/DL — SIGNIFICANT CHANGE UP (ref 3.3–5.2)
ALP SERPL-CCNC: 246 U/L — HIGH (ref 40–120)
ALT FLD-CCNC: 116 U/L — HIGH
ANION GAP SERPL CALC-SCNC: 15 MMOL/L — SIGNIFICANT CHANGE UP (ref 5–17)
AST SERPL-CCNC: 55 U/L — HIGH
BASOPHILS # BLD AUTO: 0.05 K/UL — SIGNIFICANT CHANGE UP (ref 0–0.2)
BASOPHILS NFR BLD AUTO: 0.6 % — SIGNIFICANT CHANGE UP (ref 0–2)
BILIRUB SERPL-MCNC: <0.2 MG/DL — LOW (ref 0.4–2)
BLD GP AB SCN SERPL QL: SIGNIFICANT CHANGE UP
BUN SERPL-MCNC: 11.5 MG/DL — SIGNIFICANT CHANGE UP (ref 8–20)
CALCIUM SERPL-MCNC: 8.7 MG/DL — SIGNIFICANT CHANGE UP (ref 8.4–10.5)
CHLORIDE SERPL-SCNC: 107 MMOL/L — SIGNIFICANT CHANGE UP (ref 96–108)
CO2 SERPL-SCNC: 21 MMOL/L — LOW (ref 22–29)
CREAT SERPL-MCNC: 1.07 MG/DL — SIGNIFICANT CHANGE UP (ref 0.5–1.3)
EGFR: 61 ML/MIN/1.73M2 — SIGNIFICANT CHANGE UP
EGFR: 61 ML/MIN/1.73M2 — SIGNIFICANT CHANGE UP
EOSINOPHIL # BLD AUTO: 0.32 K/UL — SIGNIFICANT CHANGE UP (ref 0–0.5)
EOSINOPHIL NFR BLD AUTO: 3.9 % — SIGNIFICANT CHANGE UP (ref 0–6)
ESTIMATED AVERAGE GLUCOSE: 123 MG/DL — HIGH (ref 68–114)
GLUCOSE SERPL-MCNC: 81 MG/DL — SIGNIFICANT CHANGE UP (ref 70–99)
HCT VFR BLD CALC: 36.8 % — SIGNIFICANT CHANGE UP (ref 34.5–45)
HGB BLD-MCNC: 12.1 G/DL — SIGNIFICANT CHANGE UP (ref 11.5–15.5)
IMM GRANULOCYTES # BLD AUTO: 0.03 K/UL — SIGNIFICANT CHANGE UP (ref 0–0.07)
IMM GRANULOCYTES NFR BLD AUTO: 0.4 % — SIGNIFICANT CHANGE UP (ref 0–0.9)
LYMPHOCYTES # BLD AUTO: 1.56 K/UL — SIGNIFICANT CHANGE UP (ref 1–3.3)
LYMPHOCYTES NFR BLD AUTO: 19.1 % — SIGNIFICANT CHANGE UP (ref 13–44)
MCHC RBC-ENTMCNC: 29.4 PG — SIGNIFICANT CHANGE UP (ref 27–34)
MCHC RBC-ENTMCNC: 32.9 G/DL — SIGNIFICANT CHANGE UP (ref 32–36)
MCV RBC AUTO: 89.3 FL — SIGNIFICANT CHANGE UP (ref 80–100)
MONOCYTES # BLD AUTO: 0.66 K/UL — SIGNIFICANT CHANGE UP (ref 0–0.9)
MONOCYTES NFR BLD AUTO: 8.1 % — SIGNIFICANT CHANGE UP (ref 2–14)
NEUTROPHILS # BLD AUTO: 5.54 K/UL — SIGNIFICANT CHANGE UP (ref 1.8–7.4)
NEUTROPHILS NFR BLD AUTO: 67.9 % — SIGNIFICANT CHANGE UP (ref 43–77)
NRBC # BLD AUTO: 0 K/UL — SIGNIFICANT CHANGE UP (ref 0–0)
NRBC # FLD: 0 K/UL — SIGNIFICANT CHANGE UP (ref 0–0)
NRBC BLD AUTO-RTO: 0 /100 WBCS — SIGNIFICANT CHANGE UP (ref 0–0)
PLATELET # BLD AUTO: 207 K/UL — SIGNIFICANT CHANGE UP (ref 150–400)
PMV BLD: 10.2 FL — SIGNIFICANT CHANGE UP (ref 7–13)
POTASSIUM SERPL-MCNC: 4.3 MMOL/L — SIGNIFICANT CHANGE UP (ref 3.5–5.3)
POTASSIUM SERPL-SCNC: 4.3 MMOL/L — SIGNIFICANT CHANGE UP (ref 3.5–5.3)
PROT SERPL-MCNC: 6.8 G/DL — SIGNIFICANT CHANGE UP (ref 6.6–8.7)
RBC # BLD: 4.12 M/UL — SIGNIFICANT CHANGE UP (ref 3.8–5.2)
RBC # FLD: 12.9 % — SIGNIFICANT CHANGE UP (ref 10.3–14.5)
SODIUM SERPL-SCNC: 143 MMOL/L — SIGNIFICANT CHANGE UP (ref 135–145)
WBC # BLD: 8.16 K/UL — SIGNIFICANT CHANGE UP (ref 3.8–10.5)
WBC # FLD AUTO: 8.16 K/UL — SIGNIFICANT CHANGE UP (ref 3.8–10.5)

## 2025-06-23 PROCEDURE — 93010 ELECTROCARDIOGRAM REPORT: CPT

## 2025-06-23 PROCEDURE — 86901 BLOOD TYPING SEROLOGIC RH(D): CPT

## 2025-06-23 PROCEDURE — 83036 HEMOGLOBIN GLYCOSYLATED A1C: CPT

## 2025-06-23 PROCEDURE — 86850 RBC ANTIBODY SCREEN: CPT

## 2025-06-23 PROCEDURE — 87640 STAPH A DNA AMP PROBE: CPT

## 2025-06-23 PROCEDURE — G0463: CPT

## 2025-06-23 PROCEDURE — 86900 BLOOD TYPING SEROLOGIC ABO: CPT

## 2025-06-23 PROCEDURE — 93005 ELECTROCARDIOGRAM TRACING: CPT

## 2025-06-23 PROCEDURE — 85025 COMPLETE CBC W/AUTO DIFF WBC: CPT

## 2025-06-23 PROCEDURE — 36415 COLL VENOUS BLD VENIPUNCTURE: CPT

## 2025-06-23 PROCEDURE — 87641 MR-STAPH DNA AMP PROBE: CPT

## 2025-06-23 PROCEDURE — 80053 COMPREHEN METABOLIC PANEL: CPT

## 2025-06-23 RX ORDER — CEFAZOLIN SODIUM IN 0.9 % NACL 3 G/100 ML
2000 INTRAVENOUS SOLUTION, PIGGYBACK (ML) INTRAVENOUS ONCE
Refills: 0 | Status: COMPLETED | OUTPATIENT
Start: 2025-07-07 | End: 2025-07-07

## 2025-06-23 NOTE — H&P PST ADULT - HISTORY OF PRESENT ILLNESS
Pt is a 55 years old female seen today pre-op for upper endoscopy incision hernia repair  Pt is a 55 years old female seen today pre-op for upper endoscopy incision hernia repair. Pt reports hx of hiatal hernia repair April 2024,Pt reports hx of umbilical hernia since 2023, states hernia increased in size status post up hiatal hernia repair in 2024. Pt intermittent pain and discomfort, pain aggravated with meals, nauseas. Pt denies fever/chills   Pt is a 55 years old female seen today pre-op for upper endoscopy incision hernia repair. Pt reports hx of hiatal hernia repair approximately  April 2024,  reports umbilical hernia was first  noticed in 2023,  the hernia increased in size and pain symptoms  status post up hiatal hernia repair in 2024. Pt  reports intermittent pain and discomfort, pain aggravated with meals, nauseas. Pt denies fever/chills, and any other related issues. Pt medical hx includes OCD, HLD, HTN, DMT2, Thyroid disorder, CKD,  obese BMI 35.0. Pt scheduled for this surgery on 7/7/25 with Dr. Be Marshall  Pt is a 55 years old female seen today pre-op for upper endoscopy incision hernia repair. Pt reports hx of hiatal hernia repair approximately  April 2024,  reports umbilical hernia was first  noticed in 2023,  the hernia increased in size and pain symptoms  status post hiatal hernia repair in 2024 and ever since then. Pt  reports intermittent pain and discomfort, pain aggravated with meals, nauseas, around umbilical region . Pt denies fever/chills, and any other related issues. Pt medical hx includes OCD, HLD, HTN, DMT2, Thyroid disorder, CKD,  obese BMI 35.0. Pt scheduled for this surgery on 7/7/25 with Dr. Be Marshall

## 2025-06-23 NOTE — H&P PST ADULT - NSICDXPASTMEDICALHX_GEN_ALL_CORE_FT
PAST MEDICAL HISTORY:  Diabetes mellitus     High cholesterol     History of OCD (obsessive compulsive disorder)     Hypertension     Hypothyroidism     Major depression     Obstructive sleep apnea     Stage 1 chronic kidney disease      PAST MEDICAL HISTORY:  Diabetes mellitus     Dysphagia     High cholesterol     History of OCD (obsessive compulsive disorder)     Hypertension     Hypothyroidism     Incisional hernia     Major depression     Obstructive sleep apnea     Stage 1 chronic kidney disease

## 2025-06-23 NOTE — H&P PST ADULT - BIRTH SEX
Your current Orthopaedic problem we are working together to treat is:  Left knee    It is recommended you schedule a follow-up appointment with Liat Montoya PA-C in 4-6 weeks.  Office hours are 8:00 am to 5:00 pm Monday through Friday.  If it is urgent that you speak with someone outside of these hours, our [facility] Call Center will be able to assist you.  You can reach the office by calling the [phone] ([place]).  We do highly recommend [employer], if you do not already have this.  You can request access via the internet or by simply talking with a  at any of the clinics.  [email or web address].    Thank you for choosing [facility] as your Orthopaedic provider!   
Female

## 2025-06-23 NOTE — H&P PST ADULT - ASSESSMENT
Pt is a 55 years old female seen today pre-op for upper endoscopy incision hernia repair. Pt reports hx of hiatal hernia repair approximately  April 2024,  reports umbilical hernia was first  noticed in 2023,  the hernia increased in size and pain symptoms  status post up hiatal hernia repair in 2024. Pt  reports intermittent pain and discomfort, pain aggravated with meals, nauseas. Pt denies fever/chills, and any other related issues. Pt medical hx includes OCD, HLD, HTN, DMT2, Thyroid disorder, CKD,  obese BMI 35.0. Pt scheduled for this surgery on 7/7/25 with Dr. Be Marshall  Pt is a 55 years old female seen today pre-op for upper endoscopy incision hernia repair for incisional hernia and dysphagia . Pt reports hx of hiatal hernia repair approximately  2024,  reports umbilical hernia was first  noticed in ,  the hernia increased in size and pain symptoms  status post  hiatal hernia repair in  and ever since then. Pt  reports intermittent pain and discomfort, pain aggravated with meals, nauseas. around umbilical region  Pt denies fever/chills, and any other related issues. Pt medical hx includes OCD, HLD, HTN, DMT2, Thyroid disorder, CKD,  obese BMI 35.0. Pt scheduled for this surgery on 25 with Dr. Be Marshall Surgery protocol reviewed with Pt today. Pt to follow up with PCP, cardiologist for evaluation and optimization  prior to this surgery.   Patient instructed on NPO protocol   Patient instructed to stop NSAID, all vitamins supplement, Fish oil, COQ 10,  herbals 5 days before this surgery.   Pt okay to take Tylenol as needed for pain   Patient will continue to take all his medications as prescribed    Patient instructed on infection prevention   Chlorhexidine scrub instructions provided  CAPRINI VTE 2.0 SCORE [CLOT updated 2019]    AGE RELATED RISK FACTORS                                                       MOBILITY RELATED FACTORS  [X ] Age 41-60 years                                            (1 Point)                    [ ] Bed rest                                                        (1 Point)  [ ] Age: 61-74 years                                           (2 Points)                  [ ] Plaster cast                                                   (2 Points)  [ ] Age= 75 years                                              (3 Points)                    [ ] Bed bound for more than 72 hours                 (2 Points)    DISEASE RELATED RISK FACTORS                                               GENDER SPECIFIC FACTORS  [ ] Edema in the lower extremities                       (1 Point)              [ ] Pregnancy                                                     (1 Point)  [ ] Varicose veins                                               (1 Point)                     [ ] Post-partum < 6 weeks                                   (1 Point)             X[ ] BMI > 25 Kg/m2                                            (1 Point)                     [ ] Hormonal therapy  or oral contraception          (1 Point)                 [ ] Sepsis (in the previous month)                        (1 Point)               [ ] History of pregnancy complications                 (1 point)  [ ] Pneumonia or serious lung disease                                               [ ] Unexplained or recurrent                     (1 Point)           (in the previous month)                               (1 Point)  [ ] Abnormal pulmonary function test                     (1 Point)                 SURGERY RELATED RISK FACTORS  [ ] Acute myocardial infarction                              (1 Point)               [ ]  Section                                             (1 Point)  [ ] Congestive heart failure (in the previous month)  (1 Point)      [ ] Minor surgery                                                  (1 Point)   [ ] Inflammatory bowel disease                             (1 Point)               [ ] Arthroscopic surgery                                        (2 Points)  [ ] Central venous access                                      (2 Points)                [ X] General surgery lasting more than 45 minutes (2 points)  [ ] Malignancy- Present or previous                   (2 Points)                [ ] Elective arthroplasty                                         (5 points)    [ ] Stroke (in the previous month)                          (5 Points)                                                                                                                                                           HEMATOLOGY RELATED FACTORS                                                 TRAUMA RELATED RISK FACTORS  [ ] Prior episodes of VTE                                     (3 Points)                [ ] Fracture of the hip, pelvis, or leg                       (5 Points)  [ ] Positive family history for VTE                         (3 Points)             [ ] Acute spinal cord injury (in the previous month)  (5 Points)  [ ] Prothrombin 05989 A                                     (3 Points)               [ ] Paralysis  (less than 1 month)                             (5 Points)  [ ] Factor V Leiden                                             (3 Points)                  [ ] Multiple Trauma within 1 month                        (5 Points)  [ ] Lupus anticoagulants                                     (3 Points)                                                           [ ] Anticardiolipin antibodies                               (3 Points)                                                       [ ] High homocysteine in the blood                      (3 Points)                                             [ ] Other congenital or acquired thrombophilia      (3 Points)                                                [ ] Heparin induced thrombocytopenia                  (3 Points)                                     Total Score [   4       ]  OPIOID RISK TOOL    ANALILIA EACH BOX THAT APPLIES AND ADD TOTALS AT THE END    FAMILY HISTORY OF SUBSTANCE ABUSE                 FEMALE         MALE                                                Alcohol                             [  ]1 pt          [  ]3pts                                               Illegal Durgs                     [  ]2 pts        [  ]3pts                                               Rx Drugs                           [  ]4 pts        [  ]4 pts    PERSONAL HISTORY OF SUBSTANCE ABUSE                                                                                          Alcohol                             [  ]3 pts       [  ]3 pts                                               Illegal Drugs                     [  ]4 pts        [  ]4 pts                                               Rx Drugs                           [  ]5 pts        [  ]5 pts    AGE BETWEEN 16-45 YEARS                                      [  ]1 pt         [  ]1 pt    HISTORY OF PREADOLESCENT   SEXUAL ABUSE                                                             [  ]3 pts        [  ]0pts    PSYCHOLOGICAL DISEASE                     ADD, OCD, Bipolar, Schizophrenia        [  ]2 pts         [ X ]2 pts                      Depression                                               [  X]1 pt           [  ]1 pt           SCORING TOTAL   (add numbers and type here)              (**3*)                                     A score of 3 or lower indicated LOW risk for future opioid abuse  A score of 4 to 7 indicated moderate risk for future opioid abuse  A score of 8 or higher indicates a high risk for opioid abuse

## 2025-06-24 LAB
MRSA PCR RESULT.: DETECTED
S AUREUS DNA NOSE QL NAA+PROBE: DETECTED

## 2025-06-24 RX ORDER — MUPIROCIN CALCIUM 20 MG/G
1 CREAM TOPICAL
Qty: 1 | Refills: 0
Start: 2025-06-24 | End: 2025-06-28

## 2025-06-27 ENCOUNTER — NON-APPOINTMENT (OUTPATIENT)
Age: 55
End: 2025-06-27

## 2025-06-27 ENCOUNTER — APPOINTMENT (OUTPATIENT)
Dept: CARDIOLOGY | Facility: CLINIC | Age: 55
End: 2025-06-27
Payer: MEDICARE

## 2025-06-27 VITALS
RESPIRATION RATE: 14 BRPM | HEART RATE: 63 BPM | DIASTOLIC BLOOD PRESSURE: 70 MMHG | WEIGHT: 200 LBS | SYSTOLIC BLOOD PRESSURE: 110 MMHG | HEIGHT: 63.5 IN | OXYGEN SATURATION: 97 % | BODY MASS INDEX: 35 KG/M2

## 2025-06-27 PROCEDURE — 93000 ELECTROCARDIOGRAM COMPLETE: CPT

## 2025-06-27 PROCEDURE — 99214 OFFICE O/P EST MOD 30 MIN: CPT

## 2025-07-07 ENCOUNTER — INPATIENT (INPATIENT)
Facility: HOSPITAL | Age: 55
LOS: 0 days | Discharge: HOME | DRG: 392 | End: 2025-07-08
Attending: THORACIC SURGERY (CARDIOTHORACIC VASCULAR SURGERY) | Admitting: THORACIC SURGERY (CARDIOTHORACIC VASCULAR SURGERY)
Payer: MEDICARE

## 2025-07-07 ENCOUNTER — TRANSCRIPTION ENCOUNTER (OUTPATIENT)
Age: 55
End: 2025-07-07

## 2025-07-07 ENCOUNTER — APPOINTMENT (OUTPATIENT)
Dept: THORACIC SURGERY | Facility: HOSPITAL | Age: 55
End: 2025-07-07

## 2025-07-07 VITALS
TEMPERATURE: 97 F | HEART RATE: 57 BPM | RESPIRATION RATE: 16 BRPM | HEIGHT: 63 IN | SYSTOLIC BLOOD PRESSURE: 144 MMHG | WEIGHT: 200.62 LBS | DIASTOLIC BLOOD PRESSURE: 69 MMHG | OXYGEN SATURATION: 100 %

## 2025-07-07 DIAGNOSIS — Z98.890 OTHER SPECIFIED POSTPROCEDURAL STATES: Chronic | ICD-10-CM

## 2025-07-07 DIAGNOSIS — Z90.49 ACQUIRED ABSENCE OF OTHER SPECIFIED PARTS OF DIGESTIVE TRACT: Chronic | ICD-10-CM

## 2025-07-07 DIAGNOSIS — Z96.82 PRESENCE OF NEUROSTIMULATOR: Chronic | ICD-10-CM

## 2025-07-07 DIAGNOSIS — R13.10 DYSPHAGIA, UNSPECIFIED: ICD-10-CM

## 2025-07-07 LAB
ANION GAP SERPL CALC-SCNC: 10 MMOL/L — SIGNIFICANT CHANGE UP (ref 5–17)
BASOPHILS # BLD AUTO: 0.07 K/UL — SIGNIFICANT CHANGE UP (ref 0–0.2)
BASOPHILS NFR BLD AUTO: 1.1 % — SIGNIFICANT CHANGE UP (ref 0–2)
BUN SERPL-MCNC: 19 MG/DL — SIGNIFICANT CHANGE UP (ref 8–20)
CALCIUM SERPL-MCNC: 8.5 MG/DL — SIGNIFICANT CHANGE UP (ref 8.4–10.5)
CHLORIDE SERPL-SCNC: 113 MMOL/L — HIGH (ref 96–108)
CO2 SERPL-SCNC: 19 MMOL/L — LOW (ref 22–29)
CREAT SERPL-MCNC: 1.15 MG/DL — SIGNIFICANT CHANGE UP (ref 0.5–1.3)
EGFR: 56 ML/MIN/1.73M2 — LOW
EGFR: 56 ML/MIN/1.73M2 — LOW
EOSINOPHIL # BLD AUTO: 0.12 K/UL — SIGNIFICANT CHANGE UP (ref 0–0.5)
EOSINOPHIL NFR BLD AUTO: 1.8 % — SIGNIFICANT CHANGE UP (ref 0–6)
GLUCOSE BLDC GLUCOMTR-MCNC: 100 MG/DL — HIGH (ref 70–99)
GLUCOSE BLDC GLUCOMTR-MCNC: 105 MG/DL — HIGH (ref 70–99)
GLUCOSE BLDC GLUCOMTR-MCNC: 106 MG/DL — HIGH (ref 70–99)
GLUCOSE BLDC GLUCOMTR-MCNC: 151 MG/DL — HIGH (ref 70–99)
GLUCOSE BLDC GLUCOMTR-MCNC: 153 MG/DL — HIGH (ref 70–99)
GLUCOSE SERPL-MCNC: 115 MG/DL — HIGH (ref 70–99)
HCT VFR BLD CALC: 36.2 % — SIGNIFICANT CHANGE UP (ref 34.5–45)
HGB BLD-MCNC: 12.1 G/DL — SIGNIFICANT CHANGE UP (ref 11.5–15.5)
IMM GRANULOCYTES # BLD AUTO: 0.04 K/UL — SIGNIFICANT CHANGE UP (ref 0–0.07)
IMM GRANULOCYTES NFR BLD AUTO: 0.6 % — SIGNIFICANT CHANGE UP (ref 0–0.9)
LYMPHOCYTES # BLD AUTO: 1.15 K/UL — SIGNIFICANT CHANGE UP (ref 1–3.3)
LYMPHOCYTES NFR BLD AUTO: 17.7 % — SIGNIFICANT CHANGE UP (ref 13–44)
MAGNESIUM SERPL-MCNC: 1.6 MG/DL — SIGNIFICANT CHANGE UP (ref 1.6–2.6)
MCHC RBC-ENTMCNC: 29.2 PG — SIGNIFICANT CHANGE UP (ref 27–34)
MCHC RBC-ENTMCNC: 33.4 G/DL — SIGNIFICANT CHANGE UP (ref 32–36)
MCV RBC AUTO: 87.2 FL — SIGNIFICANT CHANGE UP (ref 80–100)
MONOCYTES # BLD AUTO: 0.33 K/UL — SIGNIFICANT CHANGE UP (ref 0–0.9)
MONOCYTES NFR BLD AUTO: 5.1 % — SIGNIFICANT CHANGE UP (ref 2–14)
NEUTROPHILS # BLD AUTO: 4.78 K/UL — SIGNIFICANT CHANGE UP (ref 1.8–7.4)
NEUTROPHILS NFR BLD AUTO: 73.7 % — SIGNIFICANT CHANGE UP (ref 43–77)
NRBC # BLD AUTO: 0 K/UL — SIGNIFICANT CHANGE UP (ref 0–0)
NRBC # FLD: 0 K/UL — SIGNIFICANT CHANGE UP (ref 0–0)
NRBC BLD AUTO-RTO: 0 /100 WBCS — SIGNIFICANT CHANGE UP (ref 0–0)
PHOSPHATE SERPL-MCNC: 4.3 MG/DL — SIGNIFICANT CHANGE UP (ref 2.4–4.7)
PLATELET # BLD AUTO: 187 K/UL — SIGNIFICANT CHANGE UP (ref 150–400)
PMV BLD: 9.9 FL — SIGNIFICANT CHANGE UP (ref 7–13)
POTASSIUM SERPL-MCNC: 4.1 MMOL/L — SIGNIFICANT CHANGE UP (ref 3.5–5.3)
POTASSIUM SERPL-SCNC: 4.1 MMOL/L — SIGNIFICANT CHANGE UP (ref 3.5–5.3)
RBC # BLD: 4.15 M/UL — SIGNIFICANT CHANGE UP (ref 3.8–5.2)
RBC # FLD: 12.4 % — SIGNIFICANT CHANGE UP (ref 10.3–14.5)
SODIUM SERPL-SCNC: 141 MMOL/L — SIGNIFICANT CHANGE UP (ref 135–145)
WBC # BLD: 6.49 K/UL — SIGNIFICANT CHANGE UP (ref 3.8–10.5)
WBC # FLD AUTO: 6.49 K/UL — SIGNIFICANT CHANGE UP (ref 3.8–10.5)

## 2025-07-07 PROCEDURE — 49591 RPR AA HRN 1ST < 3 CM RDC: CPT

## 2025-07-07 PROCEDURE — 82962 GLUCOSE BLOOD TEST: CPT

## 2025-07-07 PROCEDURE — 83735 ASSAY OF MAGNESIUM: CPT

## 2025-07-07 PROCEDURE — 36415 COLL VENOUS BLD VENIPUNCTURE: CPT

## 2025-07-07 PROCEDURE — 84100 ASSAY OF PHOSPHORUS: CPT

## 2025-07-07 PROCEDURE — 80048 BASIC METABOLIC PNL TOTAL CA: CPT

## 2025-07-07 PROCEDURE — 49591 RPR AA HRN 1ST < 3 CM RDC: CPT | Mod: AS

## 2025-07-07 PROCEDURE — 85025 COMPLETE CBC W/AUTO DIFF WBC: CPT

## 2025-07-07 RX ORDER — MELATONIN 5 MG
1 TABLET ORAL
Refills: 0 | DISCHARGE

## 2025-07-07 RX ORDER — GABAPENTIN 400 MG/1
600 CAPSULE ORAL EVERY 12 HOURS
Refills: 0 | Status: DISCONTINUED | OUTPATIENT
Start: 2025-07-07 | End: 2025-07-08

## 2025-07-07 RX ORDER — FENTANYL CITRATE-0.9 % NACL/PF 100MCG/2ML
25 SYRINGE (ML) INTRAVENOUS
Refills: 0 | Status: DISCONTINUED | OUTPATIENT
Start: 2025-07-07 | End: 2025-07-07

## 2025-07-07 RX ORDER — HEPARIN SODIUM 1000 [USP'U]/ML
5000 INJECTION INTRAVENOUS; SUBCUTANEOUS EVERY 8 HOURS
Refills: 0 | Status: DISCONTINUED | OUTPATIENT
Start: 2025-07-08 | End: 2025-07-08

## 2025-07-07 RX ORDER — KETOROLAC TROMETHAMINE 30 MG/ML
15 INJECTION, SOLUTION INTRAMUSCULAR; INTRAVENOUS EVERY 6 HOURS
Refills: 0 | Status: DISCONTINUED | OUTPATIENT
Start: 2025-07-07 | End: 2025-07-08

## 2025-07-07 RX ORDER — ACETAMINOPHEN 500 MG/5ML
1000 LIQUID (ML) ORAL ONCE
Refills: 0 | Status: COMPLETED | OUTPATIENT
Start: 2025-07-08 | End: 2025-07-08

## 2025-07-07 RX ORDER — HYDROMORPHONE/SOD CHLOR,ISO/PF 2 MG/10 ML
1 SYRINGE (ML) INJECTION EVERY 4 HOURS
Refills: 0 | Status: DISCONTINUED | OUTPATIENT
Start: 2025-07-07 | End: 2025-07-08

## 2025-07-07 RX ORDER — METFORMIN HYDROCHLORIDE 500 MG/1
1 TABLET ORAL
Refills: 0 | DISCHARGE

## 2025-07-07 RX ORDER — AMLODIPINE BESYLATE 10 MG/1
10 TABLET ORAL DAILY
Refills: 0 | Status: DISCONTINUED | OUTPATIENT
Start: 2025-07-08 | End: 2025-07-08

## 2025-07-07 RX ORDER — SENNA 187 MG
2 TABLET ORAL AT BEDTIME
Refills: 0 | Status: DISCONTINUED | OUTPATIENT
Start: 2025-07-07 | End: 2025-07-08

## 2025-07-07 RX ORDER — LIPASE/PROTEASE/AMYLASE 10K-37.5K
1 CAPSULE,DELAYED RELEASE (ENTERIC COATED) ORAL
Refills: 0 | DISCHARGE

## 2025-07-07 RX ORDER — GLUCAGON 3 MG/1
1 POWDER NASAL ONCE
Refills: 0 | Status: DISCONTINUED | OUTPATIENT
Start: 2025-07-07 | End: 2025-07-08

## 2025-07-07 RX ORDER — ONDANSETRON HCL/PF 4 MG/2 ML
4 VIAL (ML) INJECTION ONCE
Refills: 0 | Status: DISCONTINUED | OUTPATIENT
Start: 2025-07-07 | End: 2025-07-07

## 2025-07-07 RX ORDER — SODIUM CHLORIDE 9 G/1000ML
1000 INJECTION, SOLUTION INTRAVENOUS
Refills: 0 | Status: DISCONTINUED | OUTPATIENT
Start: 2025-07-07 | End: 2025-07-08

## 2025-07-07 RX ORDER — OXYCODONE HYDROCHLORIDE 30 MG/1
10 TABLET ORAL EVERY 4 HOURS
Refills: 0 | Status: DISCONTINUED | OUTPATIENT
Start: 2025-07-07 | End: 2025-07-08

## 2025-07-07 RX ORDER — LEVOTHYROXINE SODIUM 300 MCG
112 TABLET ORAL DAILY
Refills: 0 | Status: DISCONTINUED | OUTPATIENT
Start: 2025-07-08 | End: 2025-07-08

## 2025-07-07 RX ORDER — OXYCODONE HYDROCHLORIDE 30 MG/1
5 TABLET ORAL ONCE
Refills: 0 | Status: DISCONTINUED | OUTPATIENT
Start: 2025-07-07 | End: 2025-07-07

## 2025-07-07 RX ORDER — TOPIRAMATE 25 MG/1
100 TABLET, FILM COATED ORAL DAILY
Refills: 0 | Status: DISCONTINUED | OUTPATIENT
Start: 2025-07-07 | End: 2025-07-08

## 2025-07-07 RX ORDER — INSULIN LISPRO 100 U/ML
INJECTION, SOLUTION INTRAVENOUS; SUBCUTANEOUS
Refills: 0 | Status: DISCONTINUED | OUTPATIENT
Start: 2025-07-07 | End: 2025-07-08

## 2025-07-07 RX ORDER — LOSARTAN POTASSIUM 100 MG/1
50 TABLET, FILM COATED ORAL DAILY
Refills: 0 | Status: DISCONTINUED | OUTPATIENT
Start: 2025-07-07 | End: 2025-07-08

## 2025-07-07 RX ORDER — OXYCODONE HYDROCHLORIDE 30 MG/1
5 TABLET ORAL EVERY 4 HOURS
Refills: 0 | Status: DISCONTINUED | OUTPATIENT
Start: 2025-07-07 | End: 2025-07-08

## 2025-07-07 RX ORDER — MELATONIN 5 MG
5 TABLET ORAL AT BEDTIME
Refills: 0 | Status: DISCONTINUED | OUTPATIENT
Start: 2025-07-07 | End: 2025-07-08

## 2025-07-07 RX ORDER — SODIUM CHLORIDE 9 G/1000ML
1000 INJECTION, SOLUTION INTRAVENOUS
Refills: 0 | Status: DISCONTINUED | OUTPATIENT
Start: 2025-07-07 | End: 2025-07-07

## 2025-07-07 RX ORDER — SEMAGLUTIDE 1 MG/.5ML
1 INJECTION, SOLUTION SUBCUTANEOUS
Refills: 0 | DISCHARGE

## 2025-07-07 RX ORDER — MAGNESIUM SULFATE 500 MG/ML
2 SYRINGE (ML) INJECTION ONCE
Refills: 0 | Status: COMPLETED | OUTPATIENT
Start: 2025-07-07 | End: 2025-07-07

## 2025-07-07 RX ORDER — METOPROLOL SUCCINATE 50 MG/1
1 TABLET, EXTENDED RELEASE ORAL
Refills: 0 | DISCHARGE

## 2025-07-07 RX ORDER — ACETAMINOPHEN 500 MG/5ML
1000 LIQUID (ML) ORAL ONCE
Refills: 0 | Status: COMPLETED | OUTPATIENT
Start: 2025-07-07 | End: 2025-07-07

## 2025-07-07 RX ORDER — FLUVOXAMINE MALEATE 25 MG/1
1 TABLET, FILM COATED ORAL
Refills: 0 | DISCHARGE

## 2025-07-07 RX ORDER — ACETAMINOPHEN 500 MG/5ML
975 LIQUID (ML) ORAL ONCE
Refills: 0 | Status: COMPLETED | OUTPATIENT
Start: 2025-07-07 | End: 2025-07-07

## 2025-07-07 RX ORDER — LIPASE/PROTEASE/AMYLASE 10K-37.5K
1 CAPSULE,DELAYED RELEASE (ENTERIC COATED) ORAL DAILY
Refills: 0 | Status: DISCONTINUED | OUTPATIENT
Start: 2025-07-07 | End: 2025-07-08

## 2025-07-07 RX ORDER — INSULIN LISPRO 100 U/ML
INJECTION, SOLUTION INTRAVENOUS; SUBCUTANEOUS AT BEDTIME
Refills: 0 | Status: DISCONTINUED | OUTPATIENT
Start: 2025-07-07 | End: 2025-07-08

## 2025-07-07 RX ORDER — DEXTROSE 50 % IN WATER 50 %
12.5 SYRINGE (ML) INTRAVENOUS ONCE
Refills: 0 | Status: DISCONTINUED | OUTPATIENT
Start: 2025-07-07 | End: 2025-07-08

## 2025-07-07 RX ORDER — METOPROLOL SUCCINATE 50 MG/1
25 TABLET, EXTENDED RELEASE ORAL DAILY
Refills: 0 | Status: DISCONTINUED | OUTPATIENT
Start: 2025-07-07 | End: 2025-07-08

## 2025-07-07 RX ORDER — DEXTROSE 50 % IN WATER 50 %
15 SYRINGE (ML) INTRAVENOUS ONCE
Refills: 0 | Status: DISCONTINUED | OUTPATIENT
Start: 2025-07-07 | End: 2025-07-08

## 2025-07-07 RX ORDER — DEXTROSE 50 % IN WATER 50 %
25 SYRINGE (ML) INTRAVENOUS ONCE
Refills: 0 | Status: DISCONTINUED | OUTPATIENT
Start: 2025-07-07 | End: 2025-07-08

## 2025-07-07 RX ORDER — FLUVOXAMINE MALEATE 25 MG/1
150 TABLET, FILM COATED ORAL EVERY 12 HOURS
Refills: 0 | Status: DISCONTINUED | OUTPATIENT
Start: 2025-07-07 | End: 2025-07-08

## 2025-07-07 RX ORDER — BUPROPION HYDROBROMIDE 522 MG/1
150 TABLET, EXTENDED RELEASE ORAL DAILY
Refills: 0 | Status: DISCONTINUED | OUTPATIENT
Start: 2025-07-07 | End: 2025-07-08

## 2025-07-07 RX ORDER — BUPROPION HYDROBROMIDE 522 MG/1
1 TABLET, EXTENDED RELEASE ORAL
Refills: 0 | DISCHARGE

## 2025-07-07 RX ORDER — LOPERAMIDE HCL 1 MG/7.5ML
1 SOLUTION ORAL
Refills: 0 | DISCHARGE

## 2025-07-07 RX ADMIN — METOPROLOL SUCCINATE 25 MILLIGRAM(S): 50 TABLET, EXTENDED RELEASE ORAL at 17:20

## 2025-07-07 RX ADMIN — BUPROPION HYDROBROMIDE 150 MILLIGRAM(S): 522 TABLET, EXTENDED RELEASE ORAL at 19:53

## 2025-07-07 RX ADMIN — Medication 25 GRAM(S): at 17:16

## 2025-07-07 RX ADMIN — INSULIN LISPRO 1: 100 INJECTION, SOLUTION INTRAVENOUS; SUBCUTANEOUS at 18:09

## 2025-07-07 RX ADMIN — FLUVOXAMINE MALEATE 150 MILLIGRAM(S): 25 TABLET, FILM COATED ORAL at 17:16

## 2025-07-07 RX ADMIN — Medication 2000 MILLIGRAM(S): at 10:32

## 2025-07-07 RX ADMIN — Medication 2 TABLET(S): at 22:00

## 2025-07-07 RX ADMIN — GABAPENTIN 600 MILLIGRAM(S): 400 CAPSULE ORAL at 17:16

## 2025-07-07 RX ADMIN — Medication 400 MILLIGRAM(S): at 19:53

## 2025-07-07 RX ADMIN — Medication 1 CAPSULE(S): at 17:15

## 2025-07-07 RX ADMIN — Medication 1000 MILLIGRAM(S): at 20:53

## 2025-07-07 RX ADMIN — Medication 975 MILLIGRAM(S): at 09:21

## 2025-07-07 RX ADMIN — Medication 1 APPLICATION(S): at 17:19

## 2025-07-07 NOTE — BRIEF OPERATIVE NOTE - NSICDXBRIEFPROCEDURE_GEN_ALL_CORE_FT
PROCEDURES:  Abdominoplasty, with ventral hernia repair 07-Jul-2025 12:11:11 Incisional hernia repair Karlene Rodriges

## 2025-07-07 NOTE — ASU PREOP CHECKLIST - VERIFY SURGICAL SITE/SIDE WITH PATIENT
done details Normal S1, S2/Regular rate and variability/No murmur/Symmetric upper and lower extremity pulses of normal amplitude

## 2025-07-08 ENCOUNTER — TRANSCRIPTION ENCOUNTER (OUTPATIENT)
Age: 55
End: 2025-07-08

## 2025-07-08 VITALS
SYSTOLIC BLOOD PRESSURE: 114 MMHG | OXYGEN SATURATION: 95 % | DIASTOLIC BLOOD PRESSURE: 74 MMHG | TEMPERATURE: 98 F | RESPIRATION RATE: 18 BRPM | HEART RATE: 60 BPM

## 2025-07-08 LAB
ANION GAP SERPL CALC-SCNC: 12 MMOL/L — SIGNIFICANT CHANGE UP (ref 5–17)
BASOPHILS # BLD AUTO: 0.02 K/UL — SIGNIFICANT CHANGE UP (ref 0–0.2)
BASOPHILS NFR BLD AUTO: 0.2 % — SIGNIFICANT CHANGE UP (ref 0–2)
BUN SERPL-MCNC: 15.7 MG/DL — SIGNIFICANT CHANGE UP (ref 8–20)
CALCIUM SERPL-MCNC: 8.9 MG/DL — SIGNIFICANT CHANGE UP (ref 8.4–10.5)
CHLORIDE SERPL-SCNC: 111 MMOL/L — HIGH (ref 96–108)
CO2 SERPL-SCNC: 18 MMOL/L — LOW (ref 22–29)
CREAT SERPL-MCNC: 0.95 MG/DL — SIGNIFICANT CHANGE UP (ref 0.5–1.3)
EGFR: 70 ML/MIN/1.73M2 — SIGNIFICANT CHANGE UP
EGFR: 70 ML/MIN/1.73M2 — SIGNIFICANT CHANGE UP
EOSINOPHIL # BLD AUTO: 0 K/UL — SIGNIFICANT CHANGE UP (ref 0–0.5)
EOSINOPHIL NFR BLD AUTO: 0 % — SIGNIFICANT CHANGE UP (ref 0–6)
GLUCOSE BLDC GLUCOMTR-MCNC: 114 MG/DL — HIGH (ref 70–99)
GLUCOSE BLDC GLUCOMTR-MCNC: 95 MG/DL — SIGNIFICANT CHANGE UP (ref 70–99)
GLUCOSE SERPL-MCNC: 113 MG/DL — HIGH (ref 70–99)
HCT VFR BLD CALC: 37.7 % — SIGNIFICANT CHANGE UP (ref 34.5–45)
HGB BLD-MCNC: 12.7 G/DL — SIGNIFICANT CHANGE UP (ref 11.5–15.5)
IMM GRANULOCYTES # BLD AUTO: 0.05 K/UL — SIGNIFICANT CHANGE UP (ref 0–0.07)
IMM GRANULOCYTES NFR BLD AUTO: 0.6 % — SIGNIFICANT CHANGE UP (ref 0–0.9)
LYMPHOCYTES # BLD AUTO: 0.94 K/UL — LOW (ref 1–3.3)
LYMPHOCYTES NFR BLD AUTO: 11.1 % — LOW (ref 13–44)
MAGNESIUM SERPL-MCNC: 2.1 MG/DL — SIGNIFICANT CHANGE UP (ref 1.6–2.6)
MCHC RBC-ENTMCNC: 29 PG — SIGNIFICANT CHANGE UP (ref 27–34)
MCHC RBC-ENTMCNC: 33.7 G/DL — SIGNIFICANT CHANGE UP (ref 32–36)
MCV RBC AUTO: 86.1 FL — SIGNIFICANT CHANGE UP (ref 80–100)
MONOCYTES # BLD AUTO: 0.41 K/UL — SIGNIFICANT CHANGE UP (ref 0–0.9)
MONOCYTES NFR BLD AUTO: 4.8 % — SIGNIFICANT CHANGE UP (ref 2–14)
NEUTROPHILS # BLD AUTO: 7.07 K/UL — SIGNIFICANT CHANGE UP (ref 1.8–7.4)
NEUTROPHILS NFR BLD AUTO: 83.3 % — HIGH (ref 43–77)
NRBC # BLD AUTO: 0 K/UL — SIGNIFICANT CHANGE UP (ref 0–0)
NRBC # FLD: 0 K/UL — SIGNIFICANT CHANGE UP (ref 0–0)
NRBC BLD AUTO-RTO: 0 /100 WBCS — SIGNIFICANT CHANGE UP (ref 0–0)
PLATELET # BLD AUTO: 215 K/UL — SIGNIFICANT CHANGE UP (ref 150–400)
PMV BLD: 10 FL — SIGNIFICANT CHANGE UP (ref 7–13)
POTASSIUM SERPL-MCNC: 4.3 MMOL/L — SIGNIFICANT CHANGE UP (ref 3.5–5.3)
POTASSIUM SERPL-SCNC: 4.3 MMOL/L — SIGNIFICANT CHANGE UP (ref 3.5–5.3)
RBC # BLD: 4.38 M/UL — SIGNIFICANT CHANGE UP (ref 3.8–5.2)
RBC # FLD: 12 % — SIGNIFICANT CHANGE UP (ref 10.3–14.5)
SODIUM SERPL-SCNC: 141 MMOL/L — SIGNIFICANT CHANGE UP (ref 135–145)
WBC # BLD: 8.49 K/UL — SIGNIFICANT CHANGE UP (ref 3.8–10.5)
WBC # FLD AUTO: 8.49 K/UL — SIGNIFICANT CHANGE UP (ref 3.8–10.5)

## 2025-07-08 PROCEDURE — 85025 COMPLETE CBC W/AUTO DIFF WBC: CPT

## 2025-07-08 PROCEDURE — 83735 ASSAY OF MAGNESIUM: CPT

## 2025-07-08 PROCEDURE — 82962 GLUCOSE BLOOD TEST: CPT

## 2025-07-08 PROCEDURE — 80048 BASIC METABOLIC PNL TOTAL CA: CPT

## 2025-07-08 PROCEDURE — 84100 ASSAY OF PHOSPHORUS: CPT

## 2025-07-08 PROCEDURE — 49591 RPR AA HRN 1ST < 3 CM RDC: CPT

## 2025-07-08 PROCEDURE — 99223 1ST HOSP IP/OBS HIGH 75: CPT

## 2025-07-08 PROCEDURE — 36415 COLL VENOUS BLD VENIPUNCTURE: CPT

## 2025-07-08 PROCEDURE — C9399: CPT

## 2025-07-08 RX ORDER — OXYCODONE HYDROCHLORIDE AND ACETAMINOPHEN 10; 325 MG/1; MG/1
1 TABLET ORAL
Qty: 30 | Refills: 0
Start: 2025-07-08 | End: 2025-07-12

## 2025-07-08 RX ORDER — LEVOTHYROXINE SODIUM 300 MCG
1 TABLET ORAL
Qty: 0 | Refills: 0 | DISCHARGE

## 2025-07-08 RX ADMIN — FLUVOXAMINE MALEATE 150 MILLIGRAM(S): 25 TABLET, FILM COATED ORAL at 05:39

## 2025-07-08 RX ADMIN — Medication 1000 MILLIGRAM(S): at 02:02

## 2025-07-08 RX ADMIN — HEPARIN SODIUM 5000 UNIT(S): 1000 INJECTION INTRAVENOUS; SUBCUTANEOUS at 05:40

## 2025-07-08 RX ADMIN — LOSARTAN POTASSIUM 50 MILLIGRAM(S): 100 TABLET, FILM COATED ORAL at 05:39

## 2025-07-08 RX ADMIN — AMLODIPINE BESYLATE 10 MILLIGRAM(S): 10 TABLET ORAL at 05:39

## 2025-07-08 RX ADMIN — Medication 112 MICROGRAM(S): at 05:39

## 2025-07-08 RX ADMIN — GABAPENTIN 600 MILLIGRAM(S): 400 CAPSULE ORAL at 05:40

## 2025-07-08 RX ADMIN — METOPROLOL SUCCINATE 25 MILLIGRAM(S): 50 TABLET, EXTENDED RELEASE ORAL at 05:39

## 2025-07-08 RX ADMIN — Medication 1 APPLICATION(S): at 05:40

## 2025-07-08 RX ADMIN — Medication 400 MILLIGRAM(S): at 01:02

## 2025-07-08 RX ADMIN — Medication 400 MILLIGRAM(S): at 08:27

## 2025-07-08 RX ADMIN — Medication 1 CAPSULE(S): at 08:27

## 2025-07-08 NOTE — DISCHARGE NOTE PROVIDER - NSDCACTIVITY_GEN_ALL_CORE
Do not drive or operate machinery/Showering allowed/Do not make important decisions/Stairs allowed/Walking - Indoors allowed/No heavy lifting/straining/Walking - Outdoors allowed/Follow Instructions Provided by your Surgical Team/Activity as tolerated Showering allowed/Stairs allowed/Walking - Indoors allowed/No heavy lifting/straining/Walking - Outdoors allowed/Follow Instructions Provided by your Surgical Team/Activity as tolerated

## 2025-07-08 NOTE — DISCHARGE NOTE PROVIDER - CARE PROVIDER_API CALL
Rolando Lr  Thoracic and Cardiac Surgery  [address] [place] [id number]  Phone: [phone]  Fax: [phone]  Follow Up Time:     Griffin Guillaume  [facility]  [address] [place] [address]  Phone: [phone]  Fax: [phone]  Follow Up Time: 2 weeks

## 2025-07-08 NOTE — DISCHARGE NOTE PROVIDER - NSDCFUADDINST_GEN_ALL_CORE_FT
Please call the Cardiothoracic Surgery office at [phone] if you are experiencing any shortness of breath, chest pain, fevers or chills, drainage from the incisions, persistent nausea, vomiting or if you have any questions about your medications. If the symptoms are severe, call 911 and go to the nearest hospital.    If you need any assistance for making any appointments for a new consult or referral in any specialty, please call our [facility] Clinical Coordination Center at [phone].

## 2025-07-08 NOTE — DISCHARGE NOTE PROVIDER - NSDCCPCAREPLAN_GEN_ALL_CORE_FT
PRINCIPAL DISCHARGE DIAGNOSIS  Diagnosis: Incisional hernia  Assessment and Plan of Treatment: shower daily, soap and water to incisions, no lotions/creams to incisions  no swimming/pools/hot tubs/baths until incisions healed  no driving or heavy lifting >10lbs until cleared by surgeon  refer to discharge booklet for additional instructions  Please follow up with your Primary Care Physician 1-2 weeks from discharge.  Please follow instructions outlined in discharge booklet. Continue all medications as prescribed      SECONDARY DISCHARGE DIAGNOSES  Diagnosis: Hypertension  Assessment and Plan of Treatment: Please follow up with your Cardiologist and Primary Care Physician 1-2 weeks from discharge.  Please follow instructions outlined in discharge booklet. Continue all medications as prescribed.    Diagnosis: Diabetes type 2  Assessment and Plan of Treatment: Please follow up with your Endocrinologist and Primary Care Physician 1-2 weeks from discharge.  Please follow instructions outlined in discharge booklet. Continue all medications as prescribed.     PRINCIPAL DISCHARGE DIAGNOSIS  Diagnosis: Incisional hernia  Assessment and Plan of Treatment: Starting Saturday you may shower daily, with soap and water to incisions. Do not scrub. no lotions/creams to incisions  no swimming/pools/hot tubs/baths until incisions healed  no driving or heavy lifting >10lbs until cleared by surgeon  Pain medication as presribed      SECONDARY DISCHARGE DIAGNOSES  Diagnosis: Hypertension  Assessment and Plan of Treatment: Please follow up with your Cardiologist and Primary Care Physician 1-2 weeks from discharge.      Diagnosis: Diabetes type 2  Assessment and Plan of Treatment: Please follow up with your Endocrinologist and Primary Care Physician 1-2 weeks from discharge.

## 2025-07-08 NOTE — PATIENT PROFILE ADULT - FALL HARM RISK - HARM RISK INTERVENTIONS
Assistance with ambulation/Assistance OOB with selected safe patient handling equipment/Communicate Risk of Fall with Harm to all staff/Discuss with provider need for PT consult/Monitor for mental status changes/Monitor gait and stability/Provide patient with walking aids - walker, cane, crutches/Reinforce activity limits and safety measures with patient and family/Sit up slowly, dangle for a short time, stand at bedside before walking/Tailored Fall Risk Interventions/Use of alarms - bed, chair and/or voice tab/Visual Cue: Yellow wristband and red socks/Bed in lowest position, wheels locked, appropriate side rails in place/Call bell, personal items and telephone in reach/Instruct patient to call for assistance before getting out of bed or chair/Non-slip footwear when patient is out of bed/[place] to call system/Physically safe environment - no spills, clutter or unnecessary equipment/Purposeful Proactive Rounding/Room/bathroom lighting operational, light cord in reach

## 2025-07-08 NOTE — PHYSICAL THERAPY INITIAL EVALUATION ADULT - PRECAUTIONS/LIMITATIONS, REHAB EVAL
keep head of bed above 30degrees, ambulate in hallway after 24hrs however can ambulate at bedside prior to that/cardiac precautions

## 2025-07-08 NOTE — DISCHARGE NOTE NURSING/CASE MANAGEMENT/SOCIAL WORK - FINANCIAL ASSISTANCE
[facility] provides services at a reduced cost to those who are determined to be eligible through [facility]’s financial assistance program. Information regarding [facility]’s financial assistance program can be found by going to [email or web address] or by calling [phone].

## 2025-07-08 NOTE — DISCHARGE NOTE PROVIDER - HOSPITAL COURSE
Pt is a 55 years old female with hx of OCD, HLD, HTN, DMT2, Thyroid disorder, CKD,  obese BMI 35.0, s/p hiatal hernia repair approximately April 2024, reports umbilical hernia was first noticed in 2023. the hernia increased in size and pain symptoms  status post hiatal hernia repair in 2024 and ever since then. Pt  reports intermittent pain and discomfort, pain aggravated with meals, nauseas, around umbilical region . S/p incisional hernia repair with Dr. Lr on 7/7. Post-op uncomplicated.  Pt is stable from a hemodynamic and respiratory standpoint.  Pt is deemed stable for discharge per Dr. Lr. Pt is a 55 years old female with hx of OCD, HLD, HTN, DMT2, Thyroid disorder, CKD,  obese BMI 35.0, s/p hiatal hernia repair approximately April 2024, reports umbilical hernia was first noticed in 2023. the hernia increased in size and pain symptoms  status post hiatal hernia repair in 2024 and ever since then. Pt  reports intermittent pain and discomfort, pain aggravated with meals, nauseas, around umbilical region . S/p incisional hernia repair with Dr. Lr on 7/7. Post-op uncomplicated.  Pt is stable from a hemodynamic and respiratory standpoint.  Pt is deemed stable for discharge per Dr. Lr with outpatient follow up in 2 weeks.

## 2025-07-08 NOTE — PATIENT PROFILE ADULT - NSPRESCRALCFREQ_GEN_A_NUR
Gen: no acute distress  Head: normocephalic, atraumatic  Lung: CTAB, no respiratory distress, no wheezing, rales, rhonchi  CV: normal s1/s2, rrr,   Abd: soft, non-tender, non-distended, no peritoneal signs, no cva ttp   MSK: full range of motion in all 4 extremities  Neuro: No focal neurologic deficits Never

## 2025-07-08 NOTE — DISCHARGE NOTE NURSING/CASE MANAGEMENT/SOCIAL WORK - PATIENT PORTAL LINK FT
You can access the FollowMyHealth Patient Portal offered by [facility] by registering at the following website: [email or web address]. By joining [facility]’s FollowMyHealth portal, you will also be able to view your health information using other applications (apps) compatible with our system. Blurred vision

## 2025-07-08 NOTE — CONSULT NOTE ADULT - ASSESSMENT
Pt is a 55 years old female with PMH of HTN , HLD, DM - type 2 , CKD - STAGE 3, OCD , obesity , hx of hiatal hernia repair , LORA - CPAP , Hypothyroidism ,  seen today pre-op for upper endoscopy . Pt reports hx of hiatal hernia repair approximately  April 2024,  reports umbilical hernia was first  noticed in 2023,  the hernia increased in size and pain symptoms  status post hiatal hernia repair in 2024 and ever since then. Pt  reports intermittent pain and discomfort, pain aggravated with meals, nauseas, around umbilical region . s/p Abdominoplasty, with ventral hernia repair, POD#1      1. HTN- continue Amlodipine 10 mg daily, continue Metoprolol succ 25 mg daily  with parameters .   BP on lower side this am , hold on Irbesartan today , may restart POD#2 if BP allows     2. Hypothyroidism - continue Levothyroxine 112 mcg daily     3. Hx of Depression - continue Wellbutrin  mg daily   FluvoxaMINE 150 mg oral capsule, extended release: 1 cap(s) orally 2 times a day    4. Hx of pancreatic enzymes deficiency - continue Creon DR  36,000 U daily     5. DM type 2- on Metformin at home - hold for now - restart on d/c  pre op A1C - 5.9   Continue ISSC     6. Hx of migraine- on Topamax 100 mg PRN - continue     7. Hx of LORA - uses CPAP - may use own if family able to bring it , monitor nocturnal O2     8. Labs reviewed , CO2- 18 - low , Cl - 113-->111- likely MA due to dehydration - increase PO fluid intake   or give ivf if patient unable to increase PO fluid intake .    9. Diabetic neuropathy - continue home dose Gabapentin     10. DVT prophylaxis  - as per primary team     Thank you for the courtesy of this consult.   Will follow along with you .       Pt is a 55 years old female with PMH of HTN , HLD, DM - type 2 , CKD - STAGE 3, OCD , obesity , hx of hiatal hernia repair , LORA - CPAP , Hypothyroidism ,  seen today pre-op for upper endoscopy . Pt reports hx of hiatal hernia repair approximately  April 2024,  reports umbilical hernia was first  noticed in 2023,  the hernia increased in size and pain symptoms  status post hiatal hernia repair in 2024 and ever since then. Pt  reports intermittent pain and discomfort, pain aggravated with meals, nauseas, around umbilical region . s/p Abdominoplasty, with ventral hernia repair, POD#1      1. HTN- continue Amlodipine 10 mg daily, continue Metoprolol succ 25 mg daily  with parameters .   BP on lower side this am , hold on Irbesartan today , may restart POD#2 if BP allows     2. Hypothyroidism - continue Levothyroxine 112 mcg daily     3. Hx of Depression - continue Wellbutrin  mg daily   FluvoxaMINE 150 mg oral capsule, extended release: 1 cap(s) orally 2 times a day    4. Hx of pancreatic enzymes deficiency - continue Creon DR  36,000 U daily     5. DM type 2- on Metformin at home - hold for now - restart on d/c  pre op A1C - 5.9   Continue ISSC     6. Hx of migraine- on Topamax 100 mg PRN - continue     7. Hx of LORA - uses CPAP - may use own if family able to bring it , monitor nocturnal O2     8. Labs reviewed , CO2- 18 - low , Cl - 113-->111- likely MA due to dehydration - increase PO fluid intake   or give ivf if patient unable to increase PO fluid intake .    9. Diabetic neuropathy - continue home dose Gabapentin       10. Incisional hernia , s/p  Abdominoplasty, with ventral hernia repair, POD#1 ,   Diet  as per primary team   Pain control   wound care as per primary team  Abdominal binder as per primary team     10. DVT prophylaxis  - as per primary team     Thank you for the courtesy of this consult.   Will follow along with you .

## 2025-07-08 NOTE — DISCHARGE NOTE PROVIDER - CARE PROVIDERS DIRECT ADDRESSES
[email or web address]

## 2025-07-08 NOTE — CONSULT NOTE ADULT - SUBJECTIVE AND OBJECTIVE BOX
Patient is a 55y old  Female who is s/p Abdominoplasty, with ventral hernia repair, POD#1 ,   c/o abdominal pain worsening this am , denies n/v, voiding , last BM 2 days ago , not passing flatus yet .   Denies sob/chest pain . Sitting on the chair .     CC: incisional hernia incising in the size ,intermittent pain and discomfort, pain aggravated with meals, nauseas, around umbilical region ,    post op with abdominal pain         HPI:  Pt is a 55 years old female with PMH of HTN , HLD, DM - type 2 , CKD - STAGE 3, OCD , Major Depression  obesity , hx of hiatal hernia repair , LORA - CPAP , Hypothyroidism ,  seen today pre-op for upper endoscopy . Pt reports hx of hiatal hernia repair approximately  April 2024,  reports umbilical hernia was first  noticed in 2023,  the hernia increased in size and pain symptoms  status post hiatal hernia repair in 2024 and ever since then. Pt  reports intermittent pain and discomfort, pain aggravated with meals, nauseas, around umbilical region .      PAST MEDICAL & SURGICAL HISTORY:  Hypertension      High cholesterol      Diabetes mellitus      Major depression      Stage 1 chronic kidney disease      Obstructive sleep apnea      History of OCD (obsessive compulsive disorder)      Hypothyroidism      Incisional hernia      Dysphagia      History of cholecystectomy      Sacral nerve stimulator present      History of repair of hiatal hernia      History of suburethral sling procedure          Social History:  Tobacco - smoker for 20 yrs , quit 1 month ago   ETOH - occasionally   Illicit drug abuse - denies    FAMILY HISTORY:  FH: hypertension (Father, Mother)  Father with pancreatic cancer   FH: heart disease (Father)        Allergies    Keflex (Rash)  penicillin (Unknown)    Intolerances        HOME MEDICATIONS :     amLODIPine 10 mg oral tablet: 1 orally once a day (07 Jul 2025 09:03)  cholecalciferol:  (07 Jul 2025 09:03)  Creon 36,000 units oral delayed release capsule: 1 cap(s) orally once a day (07 Jul 2025 09:03)  fluvoxaMINE 150 mg oral capsule, extended release: 1 cap(s) orally 2 times a day (07 Jul 2025 09:03)  gabapentin 300 mg oral tablet: 2 tab(s) orally 2 times a day (07 Jul 2025 09:03)  irbesartan 150 mg oral tablet: 1 tab(s) orally once a day (07 Jul 2025 09:03)  levothyroxine 112 mcg (0.112 mg) oral tablet: 1 tab(s) orally (07 Jul 2025 09:03)  loperamide 2 mg oral capsule: 1 cap(s) orally once a day (07 Jul 2025 09:03)  melatonin 10 mg oral tablet: 1 tab(s) orally once a day (07 Jul 2025 09:03)  metFORMIN 500 mg oral tablet: 1 tab(s) orally 2 times a day (07 Jul 2025 09:03)  metoprolol succinate 25 mg oral tablet, extended release: 1 tab(s) orally once a day (at bedtime) (07 Jul 2025 09:03)  Rybelsus 3 mg oral tablet: 1 tab(s) orally once a day (07 Jul 2025 09:03)  Topamax 100 mg oral tablet: 100 milligram(s) orally once a day as needed for  headache (07 Jul 2025 09:03)  Wellbutrin  mg/24 hours oral tablet, extended release: 1 tab(s) orally once a day (07 Jul 2025 09:03)      REVIEW OF SYSTEMS:    As above , all other systems are reviewed and are negative .     MEDICATIONS  (STANDING):  amLODIPine   Tablet 10 milliGRAM(s) Oral daily  buPROPion XL (24-Hour) . 150 milliGRAM(s) Oral daily  chlorhexidine 2% Cloths 1 Application(s) Topical <User Schedule>  dextrose 5%. 1000 milliLiter(s) (100 mL/Hr) IV Continuous <Continuous>  dextrose 5%. 1000 milliLiter(s) (50 mL/Hr) IV Continuous <Continuous>  dextrose 50% Injectable 25 Gram(s) IV Push once  dextrose 50% Injectable 12.5 Gram(s) IV Push once  dextrose 50% Injectable 25 Gram(s) IV Push once  fluvoxaMINE 150 milliGRAM(s) Oral every 12 hours  gabapentin 600 milliGRAM(s) Oral every 12 hours  glucagon  Injectable 1 milliGRAM(s) IntraMuscular once  heparin   Injectable 5000 Unit(s) SubCutaneous every 8 hours  insulin lispro (ADMELOG) corrective regimen sliding scale   SubCutaneous three times a day before meals  insulin lispro (ADMELOG) corrective regimen sliding scale   SubCutaneous at bedtime  levothyroxine 112 MICROGram(s) Oral daily  losartan 50 milliGRAM(s) Oral daily  melatonin 5 milliGRAM(s) Oral at bedtime  metoprolol succinate ER 25 milliGRAM(s) Oral daily  pancrelipase  (CREON 36,000 Lipase Units) 1 Capsule(s) Oral daily  senna 2 Tablet(s) Oral at bedtime    MEDICATIONS  (PRN):  dextrose Oral Gel 15 Gram(s) Oral once PRN Blood Glucose LESS THAN 70 milliGRAM(s)/deciliter  HYDROmorphone  Injectable 1 milliGRAM(s) IV Push every 4 hours PRN Severe Pain (7 - 10)  ketorolac   Injectable 15 milliGRAM(s) IV Push every 6 hours PRN breakthrough pain  oxyCODONE    IR 5 milliGRAM(s) Oral every 4 hours PRN Mild Pain (1 - 3)  oxyCODONE    IR 10 milliGRAM(s) Oral every 4 hours PRN Moderate Pain (4 - 6)  topiramate 100 milliGRAM(s) Oral daily PRN for headache      Vital Signs Last 24 Hrs  T(C): 36.8 (08 Jul 2025 07:40), Max: 36.8 (08 Jul 2025 07:40)  T(F): 98.3 (08 Jul 2025 07:40), Max: 98.3 (08 Jul 2025 07:40)  HR: 59 (08 Jul 2025 07:40) (59 - 77)  BP: 117/75 (08 Jul 2025 07:40) (105/65 - 129/64)  BP(mean): 86 (08 Jul 2025 05:30) (80 - 101)  RR: 18 (08 Jul 2025 07:40) (14 - 18)  SpO2: 93% (08 Jul 2025 07:40) (93% - 97%)    Parameters below as of 08 Jul 2025 07:40  Patient On (Oxygen Delivery Method): room air        I&O's Detail    07 Jul 2025 07:01  -  08 Jul 2025 07:00  --------------------------------------------------------  IN:    Oral Fluid: 880 mL  Total IN: 880 mL    OUT:    Voided (mL): 975 mL  Total OUT: 975 mL    Total NET: -95 mL        PHYSICAL EXAM:    GENERAL: NAD,  HEAD:  Atraumatic, Normocephalic  EYES: EOMI, PERRLA, conjunctiva and sclera clear  NECK: Supple, No JVD, Normal thyroid  NERVOUS SYSTEM:  Alert & Oriented X 4, no focal deficit   CHEST/LUNG: CTA  b/l,  no rales, rhonchi, wheezing, or rubs  HEART: Regular rate and rhythm; No murmurs, rubs, or gallops  ABDOMEN: Soft, Nontender, Nondistended; Bowel sounds present  Abdominal bider +   EXTREMITIES:  2+ Peripheral Pulses, No clubbing, cyanosis, or edema ,   LYMPH: No lymphadenopathy noted  SKIN: No rashes or lesions    LABS:                        12.7   8.49  )-----------( 215      ( 08 Jul 2025 05:37 )             37.7     07-08    141  |  111[H]  |  15.7  ----------------------------<  113[H]  4.3   |  18.0[L]  |  0.95    Ca    8.9      08 Jul 2025 05:37  Phos  4.3     07-07  Mg     2.1     07-08    A1C with Estimated Average Glucose (06.23.25 @ 15:39)    A1C with Estimated Average Glucose Result: 5.9 %   Estimated Average Glucose: 123 mg/dL        RADIOLOGY & ADDITIONAL STUDIES:    CAPILLARY BLOOD GLUCOSE      POCT Blood Glucose.: 114 mg/dL (08 Jul 2025 07:55)  POCT Blood Glucose.: 151 mg/dL (07 Jul 2025 21:15)  POCT Blood Glucose.: 153 mg/dL (07 Jul 2025 17:45)  POCT Blood Glucose.: 106 mg/dL (07 Jul 2025 11:35)  POCT Blood Glucose.: 100 mg/dL (07 Jul 2025 10:21)             Patient is a 55y old  Female who is s/p Abdominoplasty, with ventral hernia repair, POD#1 ,   c/o abdominal pain worsening this am , denies n/v, voiding , last BM 2 days ago , not passing flatus yet .   Denies sob/chest pain . Sitting on the chair .     CC: incisional hernia incising in the size ,intermittent pain and discomfort, pain aggravated with meals, nauseas, around umbilical region ,    post op with abdominal pain         HPI:  Pt is a 55 years old female with PMH of HTN , HLD, DM - type 2 , CKD - STAGE 3, OCD , Major Depression  obesity , hx of hiatal hernia repair , LORA - CPAP , Hypothyroidism ,  seen today pre-op for upper endoscopy . Pt reports hx of hiatal hernia repair approximately  April 2024,  reports umbilical hernia was first  noticed in 2023,  the hernia increased in size and pain symptoms  status post hiatal hernia repair in 2024 and ever since then. Pt  reports intermittent pain and discomfort, pain aggravated with meals, nauseas, around umbilical region .      PAST MEDICAL & SURGICAL HISTORY:  Hypertension      High cholesterol      Diabetes mellitus      Major depression      Stage 1 chronic kidney disease      Obstructive sleep apnea      History of OCD (obsessive compulsive disorder)      Hypothyroidism      Incisional hernia      Dysphagia      History of cholecystectomy      Sacral nerve stimulator present      History of repair of hiatal hernia      History of suburethral sling procedure          Social History:  Tobacco - denies   ETOH - denies   Illicit drug abuse - denies    FAMILY HISTORY:  FH: hypertension (Father, Mother)    FH: heart disease (Father)        Allergies    Keflex (Rash)  penicillin (Unknown)    Intolerances        HOME MEDICATIONS :     amLODIPine 10 mg oral tablet: 1 orally once a day (07 Jul 2025 09:03)  cholecalciferol:  (07 Jul 2025 09:03)  Creon 36,000 units oral delayed release capsule: 1 cap(s) orally once a day (07 Jul 2025 09:03)  fluvoxaMINE 150 mg oral capsule, extended release: 1 cap(s) orally 2 times a day (07 Jul 2025 09:03)  gabapentin 300 mg oral tablet: 2 tab(s) orally 2 times a day (07 Jul 2025 09:03)  irbesartan 150 mg oral tablet: 1 tab(s) orally once a day (07 Jul 2025 09:03)  levothyroxine 112 mcg (0.112 mg) oral tablet: 1 tab(s) orally (07 Jul 2025 09:03)  loperamide 2 mg oral capsule: 1 cap(s) orally once a day (07 Jul 2025 09:03)  melatonin 10 mg oral tablet: 1 tab(s) orally once a day (07 Jul 2025 09:03)  metFORMIN 500 mg oral tablet: 1 tab(s) orally 2 times a day (07 Jul 2025 09:03)  metoprolol succinate 25 mg oral tablet, extended release: 1 tab(s) orally once a day (at bedtime) (07 Jul 2025 09:03)  Rybelsus 3 mg oral tablet: 1 tab(s) orally once a day (07 Jul 2025 09:03)  Topamax 100 mg oral tablet: 100 milligram(s) orally once a day as needed for  headache (07 Jul 2025 09:03)  Wellbutrin  mg/24 hours oral tablet, extended release: 1 tab(s) orally once a day (07 Jul 2025 09:03)      REVIEW OF SYSTEMS:    As above , all other systems are reviewed and are negative .     MEDICATIONS  (STANDING):  amLODIPine   Tablet 10 milliGRAM(s) Oral daily  buPROPion XL (24-Hour) . 150 milliGRAM(s) Oral daily  chlorhexidine 2% Cloths 1 Application(s) Topical <User Schedule>  dextrose 5%. 1000 milliLiter(s) (100 mL/Hr) IV Continuous <Continuous>  dextrose 5%. 1000 milliLiter(s) (50 mL/Hr) IV Continuous <Continuous>  dextrose 50% Injectable 25 Gram(s) IV Push once  dextrose 50% Injectable 12.5 Gram(s) IV Push once  dextrose 50% Injectable 25 Gram(s) IV Push once  fluvoxaMINE 150 milliGRAM(s) Oral every 12 hours  gabapentin 600 milliGRAM(s) Oral every 12 hours  glucagon  Injectable 1 milliGRAM(s) IntraMuscular once  heparin   Injectable 5000 Unit(s) SubCutaneous every 8 hours  insulin lispro (ADMELOG) corrective regimen sliding scale   SubCutaneous three times a day before meals  insulin lispro (ADMELOG) corrective regimen sliding scale   SubCutaneous at bedtime  levothyroxine 112 MICROGram(s) Oral daily  losartan 50 milliGRAM(s) Oral daily  melatonin 5 milliGRAM(s) Oral at bedtime  metoprolol succinate ER 25 milliGRAM(s) Oral daily  pancrelipase  (CREON 36,000 Lipase Units) 1 Capsule(s) Oral daily  senna 2 Tablet(s) Oral at bedtime    MEDICATIONS  (PRN):  dextrose Oral Gel 15 Gram(s) Oral once PRN Blood Glucose LESS THAN 70 milliGRAM(s)/deciliter  HYDROmorphone  Injectable 1 milliGRAM(s) IV Push every 4 hours PRN Severe Pain (7 - 10)  ketorolac   Injectable 15 milliGRAM(s) IV Push every 6 hours PRN breakthrough pain  oxyCODONE    IR 5 milliGRAM(s) Oral every 4 hours PRN Mild Pain (1 - 3)  oxyCODONE    IR 10 milliGRAM(s) Oral every 4 hours PRN Moderate Pain (4 - 6)  topiramate 100 milliGRAM(s) Oral daily PRN for headache      Vital Signs Last 24 Hrs  T(C): 36.8 (08 Jul 2025 07:40), Max: 36.8 (08 Jul 2025 07:40)  T(F): 98.3 (08 Jul 2025 07:40), Max: 98.3 (08 Jul 2025 07:40)  HR: 59 (08 Jul 2025 07:40) (59 - 77)  BP: 117/75 (08 Jul 2025 07:40) (105/65 - 129/64)  BP(mean): 86 (08 Jul 2025 05:30) (80 - 101)  RR: 18 (08 Jul 2025 07:40) (14 - 18)  SpO2: 93% (08 Jul 2025 07:40) (93% - 97%)    Parameters below as of 08 Jul 2025 07:40  Patient On (Oxygen Delivery Method): room air        I&O's Detail    07 Jul 2025 07:01  -  08 Jul 2025 07:00  --------------------------------------------------------  IN:    Oral Fluid: 880 mL  Total IN: 880 mL    OUT:    Voided (mL): 975 mL  Total OUT: 975 mL    Total NET: -95 mL        PHYSICAL EXAM:    GENERAL: NAD,  HEAD:  Atraumatic, Normocephalic  EYES: EOMI, PERRLA, conjunctiva and sclera clear  NECK: Supple, No JVD, Normal thyroid  NERVOUS SYSTEM:  Alert & Oriented X 4, no focal deficit   CHEST/LUNG: CTA  b/l,  no rales, rhonchi, wheezing, or rubs  HEART: Regular rate and rhythm; No murmurs, rubs, or gallops  ABDOMEN: Soft, Nontender, Nondistended; Bowel sounds present  Abdominal bider +   EXTREMITIES:  2+ Peripheral Pulses, No clubbing, cyanosis, or edema ,   LYMPH: No lymphadenopathy noted  SKIN: No rashes or lesions    LABS:                        12.7   8.49  )-----------( 215      ( 08 Jul 2025 05:37 )             37.7     07-08    141  |  111[H]  |  15.7  ----------------------------<  113[H]  4.3   |  18.0[L]  |  0.95    Ca    8.9      08 Jul 2025 05:37  Phos  4.3     07-07  Mg     2.1     07-08    A1C with Estimated Average Glucose (06.23.25 @ 15:39)    A1C with Estimated Average Glucose Result: 5.9 %   Estimated Average Glucose: 123 mg/dL        RADIOLOGY & ADDITIONAL STUDIES:    CAPILLARY BLOOD GLUCOSE      POCT Blood Glucose.: 114 mg/dL (08 Jul 2025 07:55)  POCT Blood Glucose.: 151 mg/dL (07 Jul 2025 21:15)  POCT Blood Glucose.: 153 mg/dL (07 Jul 2025 17:45)  POCT Blood Glucose.: 106 mg/dL (07 Jul 2025 11:35)  POCT Blood Glucose.: 100 mg/dL (07 Jul 2025 10:21)

## 2025-07-08 NOTE — DISCHARGE NOTE NURSING/CASE MANAGEMENT/SOCIAL WORK - NSDCFUADDAPPT_GEN_ALL_CORE_FT
Resume your usual diet. You may remove ABD binder Saturday. You may then shower. Let the water and soap run over the wounds. Do not scrub. Pat Dry incisions. Leave the skin glue in place and do not peel off. It will fall off on its own.   Do NOT submerge wounds in water like in a tub or pool.   You may use dry gauze and tape to cover wounds until completely dry if needed. Please change bandage daily.   Activity as tolerated. Increase ambulation daily. Incentive spirometry daily 10x/hour.   Please do not drive until your pain is well controlled and you do not need to take opioid pain medications. NO heavy lifting.   These medications may make you constipated. If so, please take over the counter stool softeners like dulcolax or sennakot as directed on the package.  Percocet contains tylenol.  Do not take tylenol and percocet together. You may take one or the other every 6 hours depending on the amount of pain you are experiencing.   Avoid taking additional tylenol found in over the counter cough/cold preparations. Please use tylenol as directed on the bottle.    You have a follow-up appointment with Dr. Lr on 7/24/25 at 11:45AM.   The Thoracic Surgery office HAS MOVED and is now located at [address] [facility], located across the street (South) from the main hospital entrance.

## 2025-07-08 NOTE — DISCHARGE NOTE PROVIDER - NSDCMRMEDTOKEN_GEN_ALL_CORE_FT
amLODIPine 10 mg oral tablet: 1 orally once a day  cholecalciferol:   Creon 36,000 units oral delayed release capsule: 1 cap(s) orally once a day  fluvoxaMINE 150 mg oral capsule, extended release: 1 cap(s) orally 2 times a day  gabapentin 300 mg oral tablet: 2 tab(s) orally 2 times a day  irbesartan 150 mg oral tablet: 1 tab(s) orally once a day  levothyroxine 112 mcg (0.112 mg) oral tablet: 1 tab(s) orally  loperamide 2 mg oral capsule: 1 cap(s) orally once a day  melatonin 10 mg oral tablet: 1 tab(s) orally once a day  metFORMIN 500 mg oral tablet: 1 tab(s) orally 2 times a day  metoprolol succinate 25 mg oral tablet, extended release: 1 tab(s) orally once a day (at bedtime)  mupirocin 2% topical ointment: 1 application intranasally 2 times a day Apply twice daily for 5 days intranasally with Q-tip. Apply 7/2-7/6.  Rybelsus 3 mg oral tablet: 1 tab(s) orally once a day  Topamax 100 mg oral tablet: 100 milligram(s) orally once a day as needed for  headache  Wellbutrin  mg/24 hours oral tablet, extended release: 1 tab(s) orally once a day   amLODIPine 10 mg oral tablet: 1 orally once a day  cholecalciferol:   Creon 36,000 units oral delayed release capsule: 1 cap(s) orally once a day  fluvoxaMINE 150 mg oral capsule, extended release: 1 cap(s) orally 2 times a day  gabapentin 300 mg oral tablet: 2 tab(s) orally 2 times a day  irbesartan 150 mg oral tablet: 1 tab(s) orally once a day  levothyroxine 112 mcg (0.112 mg) oral tablet: 1 tab(s) orally once a day  loperamide 2 mg oral capsule: 1 cap(s) orally once a day  melatonin 10 mg oral tablet: 1 tab(s) orally once a day  metFORMIN 500 mg oral tablet: 1 tab(s) orally 2 times a day  metoprolol succinate 25 mg oral tablet, extended release: 1 tab(s) orally once a day (at bedtime)  Percocet 5 mg-325 mg oral tablet: 1 tab(s) orally every 4 hours as needed for  moderate pain MDD: 6  Rybelsus 3 mg oral tablet: 1 tab(s) orally once a day  Topamax 100 mg oral tablet: 100 milligram(s) orally once a day as needed for  headache  Wellbutrin  mg/24 hours oral tablet, extended release: 1 tab(s) orally once a day   admission

## 2025-07-08 NOTE — DISCHARGE NOTE PROVIDER - NSDCFUSCHEDAPPT_GEN_ALL_CORE_FT
Andrew Murillo  [facility]  ORTHOSURG [address]  Scheduled Appointment: 07/16/2025    Love Obando  [facility]  ENDOCRIN [address]  Scheduled Appointment: 07/28/2025    Rolando Lr  [facility]  THORSURG [address]  Scheduled Appointment: 08/14/2025    [facility]  UROGYN [address]  Scheduled Appointment: 09/12/2025

## 2025-07-08 NOTE — DISCHARGE NOTE PROVIDER - NSDCCPTREATMENT_GEN_ALL_CORE_FT
PRINCIPAL PROCEDURE  Procedure: Abdominoplasty, with ventral hernia repair  Findings and Treatment: Incisional hernia repair

## 2025-07-08 NOTE — DISCHARGE NOTE PROVIDER - NSDCFUADDAPPT_GEN_ALL_CORE_FT
You have a follow-up appointment with Dr. Lr on .....    Please follow up with your Primary Care Physician in  2-4 weeks from discharge.    Please  your prescribed medications upon discharge from [facility] located on the 1st floor here at [facility].    The Thoracic Surgery [facility] HAS MOVED and [facility] [address], located across [address] ([address]) from the main hospital entrance.  Please see map in folder for more information. Resume your usual diet. You may remove ABD binder Saturday. You may then shower. Let the water and soap run over the wounds. Do not scrub. Pat Dry incisions. Leave the skin glue in place and do not peel off. It will fall off on its own.   Do NOT submerge wounds in water like in a tub or pool.   You may use dry gauze and tape to cover wounds until completely dry if needed. Please change bandage daily.   Activity as tolerated. Increase ambulation daily. Incentive spirometry daily 10x/hour.   Please do not drive until your pain is well controlled and you do not need to take opioid pain medications. NO heavy lifting.   These medications may make you constipated. If so, please take over the counter stool softeners like dulcolax or sennakot as directed on the package.  Percocet contains tylenol.  Do not take tylenol and percocet together. You may take one or the other every 6 hours depending on the amount of pain you are experiencing.   Avoid taking additional tylenol found in over the counter cough/cold preparations. Please use tylenol as directed on the bottle.    You have a follow-up appointment with Dr. Lr on 7/24/25 at 11:45AM.   The Thoracic Surgery office HAS MOVED and is now located at [address] [facility], located across the street (South) from the main hospital entrance.

## 2025-07-15 PROBLEM — R13.10 DYSPHAGIA, UNSPECIFIED: Chronic | Status: ACTIVE | Noted: 2025-06-23

## 2025-07-19 ENCOUNTER — LABORATORY RESULT (OUTPATIENT)
Age: 55
End: 2025-07-19

## 2025-07-22 ENCOUNTER — NON-APPOINTMENT (OUTPATIENT)
Age: 55
End: 2025-07-22

## 2025-07-24 ENCOUNTER — NON-APPOINTMENT (OUTPATIENT)
Age: 55
End: 2025-07-24

## 2025-07-24 ENCOUNTER — APPOINTMENT (OUTPATIENT)
Facility: CLINIC | Age: 55
End: 2025-07-24

## 2025-07-24 VITALS
HEART RATE: 68 BPM | OXYGEN SATURATION: 98 % | BODY MASS INDEX: 33.95 KG/M2 | WEIGHT: 194 LBS | HEIGHT: 63.5 IN | TEMPERATURE: 98.4 F | SYSTOLIC BLOOD PRESSURE: 126 MMHG | DIASTOLIC BLOOD PRESSURE: 80 MMHG

## 2025-07-24 DIAGNOSIS — K44.9 DIAPHRAGMATIC HERNIA W/OUT OBSTRUCTION OR GANGRENE: ICD-10-CM

## 2025-07-24 PROBLEM — K43.2 INCISIONAL HERNIA WITHOUT OBSTRUCTION OR GANGRENE: Chronic | Status: ACTIVE | Noted: 2025-06-23

## 2025-07-24 PROCEDURE — 99213 OFFICE O/P EST LOW 20 MIN: CPT

## 2025-07-28 ENCOUNTER — APPOINTMENT (OUTPATIENT)
Dept: ENDOCRINOLOGY | Facility: CLINIC | Age: 55
End: 2025-07-28
Payer: MEDICARE

## 2025-07-28 VITALS
HEIGHT: 63.5 IN | HEART RATE: 49 BPM | DIASTOLIC BLOOD PRESSURE: 76 MMHG | OXYGEN SATURATION: 99 % | BODY MASS INDEX: 34.47 KG/M2 | WEIGHT: 197 LBS | SYSTOLIC BLOOD PRESSURE: 122 MMHG

## 2025-07-28 DIAGNOSIS — E11.9 TYPE 2 DIABETES MELLITUS W/OUT COMPLICATIONS: ICD-10-CM

## 2025-07-28 DIAGNOSIS — E03.9 HYPOTHYROIDISM, UNSPECIFIED: ICD-10-CM

## 2025-07-28 DIAGNOSIS — E55.9 VITAMIN D DEFICIENCY, UNSPECIFIED: ICD-10-CM

## 2025-07-28 LAB — GLUCOSE BLDC GLUCOMTR-MCNC: 145

## 2025-07-28 PROCEDURE — 82962 GLUCOSE BLOOD TEST: CPT

## 2025-07-28 PROCEDURE — 99214 OFFICE O/P EST MOD 30 MIN: CPT

## 2025-07-28 PROCEDURE — G2211 COMPLEX E/M VISIT ADD ON: CPT

## 2025-07-29 ENCOUNTER — APPOINTMENT (OUTPATIENT)
Dept: ORTHOPEDIC SURGERY | Facility: CLINIC | Age: 55
End: 2025-07-29
Payer: MEDICARE

## 2025-07-29 ENCOUNTER — EMERGENCY (EMERGENCY)
Facility: HOSPITAL | Age: 55
LOS: 1 days | End: 2025-07-29
Attending: EMERGENCY MEDICINE
Payer: MEDICARE

## 2025-07-29 VITALS
BODY MASS INDEX: 34.12 KG/M2 | WEIGHT: 195 LBS | DIASTOLIC BLOOD PRESSURE: 83 MMHG | HEART RATE: 55 BPM | HEIGHT: 63.5 IN | SYSTOLIC BLOOD PRESSURE: 127 MMHG

## 2025-07-29 VITALS
DIASTOLIC BLOOD PRESSURE: 53 MMHG | OXYGEN SATURATION: 97 % | HEIGHT: 63 IN | WEIGHT: 195.11 LBS | RESPIRATION RATE: 18 BRPM | HEART RATE: 62 BPM | SYSTOLIC BLOOD PRESSURE: 113 MMHG | TEMPERATURE: 98 F

## 2025-07-29 DIAGNOSIS — Z90.49 ACQUIRED ABSENCE OF OTHER SPECIFIED PARTS OF DIGESTIVE TRACT: Chronic | ICD-10-CM

## 2025-07-29 DIAGNOSIS — Z96.82 PRESENCE OF NEUROSTIMULATOR: Chronic | ICD-10-CM
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
## 2025-07-29 DIAGNOSIS — Z98.890 OTHER SPECIFIED POSTPROCEDURAL STATES: Chronic | ICD-10-CM

## 2025-07-29 DIAGNOSIS — M17.11 UNILATERAL PRIMARY OSTEOARTHRITIS, RIGHT KNEE: ICD-10-CM

## 2025-07-29 PROCEDURE — 70450 CT HEAD/BRAIN W/O DYE: CPT | Mod: 26

## 2025-07-29 PROCEDURE — 96374 THER/PROPH/DIAG INJ IV PUSH: CPT

## 2025-07-29 PROCEDURE — 74176 CT ABD & PELVIS W/O CONTRAST: CPT

## 2025-07-29 PROCEDURE — 99214 OFFICE O/P EST MOD 30 MIN: CPT

## 2025-07-29 PROCEDURE — 74176 CT ABD & PELVIS W/O CONTRAST: CPT | Mod: 26

## 2025-07-29 PROCEDURE — 71250 CT THORAX DX C-: CPT

## 2025-07-29 PROCEDURE — 71250 CT THORAX DX C-: CPT | Mod: 26

## 2025-07-29 PROCEDURE — 99285 EMERGENCY DEPT VISIT HI MDM: CPT | Mod: FS

## 2025-07-29 PROCEDURE — 96375 TX/PRO/DX INJ NEW DRUG ADDON: CPT

## 2025-07-29 PROCEDURE — 99222 1ST HOSP IP/OBS MODERATE 55: CPT

## 2025-07-29 PROCEDURE — 99284 EMERGENCY DEPT VISIT MOD MDM: CPT | Mod: 25

## 2025-07-29 PROCEDURE — 70450 CT HEAD/BRAIN W/O DYE: CPT

## 2025-07-29 RX ORDER — METOCLOPRAMIDE HCL 10 MG
10 TABLET ORAL ONCE
Refills: 0 | Status: COMPLETED | OUTPATIENT
Start: 2025-07-29 | End: 2025-07-29

## 2025-07-29 RX ORDER — ACETAMINOPHEN 500 MG/5ML
1000 LIQUID (ML) ORAL ONCE
Refills: 0 | Status: COMPLETED | OUTPATIENT
Start: 2025-07-29 | End: 2025-07-29

## 2025-07-29 RX ORDER — METOCLOPRAMIDE HCL 10 MG
10 TABLET ORAL ONCE
Refills: 0 | Status: DISCONTINUED | OUTPATIENT
Start: 2025-07-29 | End: 2025-07-29

## 2025-07-29 RX ADMIN — Medication 400 MILLIGRAM(S): at 18:09

## 2025-07-29 RX ADMIN — Medication 10 MILLIGRAM(S): at 19:29

## 2025-07-29 RX ADMIN — Medication 1000 MILLILITER(S): at 18:09

## 2025-07-30 ENCOUNTER — NON-APPOINTMENT (OUTPATIENT)
Age: 55
End: 2025-07-30

## 2025-07-30 DIAGNOSIS — K42.9 UMBILICAL HERNIA W/OUT OBSTRUCTION OR GANGRENE: ICD-10-CM

## 2025-07-30 RX ORDER — HYALURONATE SODIUM 20 MG/2 ML
20 SYRINGE (ML) INTRAARTICULAR
Qty: 3 | Refills: 0 | Status: ACTIVE | OUTPATIENT
Start: 2025-07-29

## 2025-08-04 ENCOUNTER — APPOINTMENT (OUTPATIENT)
Dept: PULMONOLOGY | Facility: CLINIC | Age: 55
End: 2025-08-04
Payer: MEDICARE

## 2025-08-04 VITALS
SYSTOLIC BLOOD PRESSURE: 130 MMHG | WEIGHT: 197 LBS | RESPIRATION RATE: 16 BRPM | HEIGHT: 62 IN | DIASTOLIC BLOOD PRESSURE: 78 MMHG | BODY MASS INDEX: 36.25 KG/M2 | OXYGEN SATURATION: 98 % | HEART RATE: 78 BPM

## 2025-08-04 DIAGNOSIS — R59.0 LOCALIZED ENLARGED LYMPH NODES: ICD-10-CM

## 2025-08-04 DIAGNOSIS — G47.33 OBSTRUCTIVE SLEEP APNEA (ADULT) (PEDIATRIC): ICD-10-CM

## 2025-08-04 PROCEDURE — 99214 OFFICE O/P EST MOD 30 MIN: CPT

## 2025-08-04 PROCEDURE — G2211 COMPLEX E/M VISIT ADD ON: CPT

## 2025-08-07 ENCOUNTER — NON-APPOINTMENT (OUTPATIENT)
Age: 55
End: 2025-08-07

## 2025-08-07 ENCOUNTER — APPOINTMENT (OUTPATIENT)
Facility: CLINIC | Age: 55
End: 2025-08-07

## 2025-08-07 ENCOUNTER — APPOINTMENT (OUTPATIENT)
Dept: SURGERY | Facility: CLINIC | Age: 55
End: 2025-08-07
Payer: MEDICARE

## 2025-08-07 VITALS
DIASTOLIC BLOOD PRESSURE: 62 MMHG | OXYGEN SATURATION: 98 % | HEIGHT: 62.5 IN | SYSTOLIC BLOOD PRESSURE: 95 MMHG | TEMPERATURE: 98 F | RESPIRATION RATE: 16 BRPM | BODY MASS INDEX: 35.52 KG/M2 | WEIGHT: 198 LBS | HEART RATE: 63 BPM

## 2025-08-07 VITALS
BODY MASS INDEX: 35.88 KG/M2 | HEIGHT: 62 IN | HEART RATE: 59 BPM | DIASTOLIC BLOOD PRESSURE: 70 MMHG | OXYGEN SATURATION: 98 % | SYSTOLIC BLOOD PRESSURE: 120 MMHG | WEIGHT: 195 LBS

## 2025-08-07 DIAGNOSIS — Z87.19 OTHER SPECIFIED POSTPROCEDURAL STATES: ICD-10-CM

## 2025-08-07 DIAGNOSIS — Z98.890 OTHER SPECIFIED POSTPROCEDURAL STATES: ICD-10-CM

## 2025-08-07 DIAGNOSIS — K43.2 INCISIONAL HERNIA W/OUT OBSTRUCTION OR GANGRENE: ICD-10-CM

## 2025-08-07 PROCEDURE — 99204 OFFICE O/P NEW MOD 45 MIN: CPT

## 2025-08-07 PROCEDURE — 99214 OFFICE O/P EST MOD 30 MIN: CPT

## 2025-09-05 ENCOUNTER — EMERGENCY (EMERGENCY)
Facility: HOSPITAL | Age: 55
LOS: 1 days | End: 2025-09-05
Attending: STUDENT IN AN ORGANIZED HEALTH CARE EDUCATION/TRAINING PROGRAM
Payer: MEDICARE

## 2025-09-05 ENCOUNTER — NON-APPOINTMENT (OUTPATIENT)
Age: 55
End: 2025-09-05

## 2025-09-05 VITALS
HEIGHT: 63 IN | OXYGEN SATURATION: 97 % | RESPIRATION RATE: 18 BRPM | TEMPERATURE: 98 F | HEART RATE: 70 BPM | SYSTOLIC BLOOD PRESSURE: 133 MMHG | WEIGHT: 198.42 LBS | DIASTOLIC BLOOD PRESSURE: 64 MMHG

## 2025-09-05 DIAGNOSIS — Z98.890 OTHER SPECIFIED POSTPROCEDURAL STATES: Chronic | ICD-10-CM

## 2025-09-05 DIAGNOSIS — Z96.82 PRESENCE OF NEUROSTIMULATOR: Chronic | ICD-10-CM

## 2025-09-05 DIAGNOSIS — Z90.49 ACQUIRED ABSENCE OF OTHER SPECIFIED PARTS OF DIGESTIVE TRACT: Chronic | ICD-10-CM

## 2025-09-05 LAB
ALBUMIN SERPL ELPH-MCNC: 3.9 G/DL — SIGNIFICANT CHANGE UP (ref 3.3–5.2)
ALP SERPL-CCNC: 255 U/L — HIGH (ref 40–120)
ALT FLD-CCNC: 344 U/L — HIGH
ANION GAP SERPL CALC-SCNC: 12 MMOL/L — SIGNIFICANT CHANGE UP (ref 5–17)
APTT BLD: 34.3 SEC — SIGNIFICANT CHANGE UP (ref 26.1–36.8)
AST SERPL-CCNC: 131 U/L — HIGH
BILIRUB SERPL-MCNC: 0.3 MG/DL — LOW (ref 0.4–2)
BLD GP AB SCN SERPL QL: SIGNIFICANT CHANGE UP
BUN SERPL-MCNC: 14.5 MG/DL — SIGNIFICANT CHANGE UP (ref 8–20)
CALCIUM SERPL-MCNC: 8.6 MG/DL — SIGNIFICANT CHANGE UP (ref 8.4–10.5)
CHLORIDE SERPL-SCNC: 110 MMOL/L — HIGH (ref 96–108)
CO2 SERPL-SCNC: 20 MMOL/L — LOW (ref 22–29)
CREAT SERPL-MCNC: 0.91 MG/DL — SIGNIFICANT CHANGE UP (ref 0.5–1.3)
EGFR: 74 ML/MIN/1.73M2 — SIGNIFICANT CHANGE UP
EGFR: 74 ML/MIN/1.73M2 — SIGNIFICANT CHANGE UP
GLUCOSE SERPL-MCNC: 82 MG/DL — SIGNIFICANT CHANGE UP (ref 70–99)
HCT VFR BLD CALC: 36.8 % — SIGNIFICANT CHANGE UP (ref 34.5–45)
HGB BLD-MCNC: 12.3 G/DL — SIGNIFICANT CHANGE UP (ref 11.5–15.5)
INR BLD: 1.02 RATIO — SIGNIFICANT CHANGE UP (ref 0.85–1.16)
LACTATE SERPL-SCNC: 1.1 MMOL/L — SIGNIFICANT CHANGE UP (ref 0.5–2)
LIDOCAIN IGE QN: 24 U/L — SIGNIFICANT CHANGE UP (ref 22–51)
MCHC RBC-ENTMCNC: 29.3 PG — SIGNIFICANT CHANGE UP (ref 27–34)
MCHC RBC-ENTMCNC: 33.4 G/DL — SIGNIFICANT CHANGE UP (ref 32–36)
MCV RBC AUTO: 87.6 FL — SIGNIFICANT CHANGE UP (ref 80–100)
NRBC # BLD AUTO: 0 K/UL — SIGNIFICANT CHANGE UP (ref 0–0)
NRBC # FLD: 0 K/UL — SIGNIFICANT CHANGE UP (ref 0–0)
NRBC BLD AUTO-RTO: 0 /100 WBCS — SIGNIFICANT CHANGE UP (ref 0–0)
PLATELET # BLD AUTO: 153 K/UL — SIGNIFICANT CHANGE UP (ref 150–400)
PMV BLD: 10.5 FL — SIGNIFICANT CHANGE UP (ref 7–13)
POTASSIUM SERPL-MCNC: 3.8 MMOL/L — SIGNIFICANT CHANGE UP (ref 3.5–5.3)
POTASSIUM SERPL-SCNC: 3.8 MMOL/L — SIGNIFICANT CHANGE UP (ref 3.5–5.3)
PROT SERPL-MCNC: 6.7 G/DL — SIGNIFICANT CHANGE UP (ref 6.6–8.7)
PROTHROM AB SERPL-ACNC: 11.8 SEC — SIGNIFICANT CHANGE UP (ref 9.9–13.4)
RBC # BLD: 4.2 M/UL — SIGNIFICANT CHANGE UP (ref 3.8–5.2)
RBC # FLD: 13.2 % — SIGNIFICANT CHANGE UP (ref 10.3–14.5)
SODIUM SERPL-SCNC: 142 MMOL/L — SIGNIFICANT CHANGE UP (ref 135–145)
WBC # BLD: 5.21 K/UL — SIGNIFICANT CHANGE UP (ref 3.8–10.5)
WBC # FLD AUTO: 5.21 K/UL — SIGNIFICANT CHANGE UP (ref 3.8–10.5)

## 2025-09-05 PROCEDURE — 83690 ASSAY OF LIPASE: CPT

## 2025-09-05 PROCEDURE — 85730 THROMBOPLASTIN TIME PARTIAL: CPT

## 2025-09-05 PROCEDURE — 36415 COLL VENOUS BLD VENIPUNCTURE: CPT

## 2025-09-05 PROCEDURE — 85027 COMPLETE CBC AUTOMATED: CPT

## 2025-09-05 PROCEDURE — 74177 CT ABD & PELVIS W/CONTRAST: CPT | Mod: 26

## 2025-09-05 PROCEDURE — 86850 RBC ANTIBODY SCREEN: CPT

## 2025-09-05 PROCEDURE — 80053 COMPREHEN METABOLIC PANEL: CPT

## 2025-09-05 PROCEDURE — 99284 EMERGENCY DEPT VISIT MOD MDM: CPT | Mod: 25

## 2025-09-05 PROCEDURE — 83605 ASSAY OF LACTIC ACID: CPT

## 2025-09-05 PROCEDURE — 99285 EMERGENCY DEPT VISIT HI MDM: CPT

## 2025-09-05 PROCEDURE — 85610 PROTHROMBIN TIME: CPT

## 2025-09-05 PROCEDURE — 86900 BLOOD TYPING SEROLOGIC ABO: CPT

## 2025-09-05 PROCEDURE — 86901 BLOOD TYPING SEROLOGIC RH(D): CPT

## 2025-09-05 PROCEDURE — 96374 THER/PROPH/DIAG INJ IV PUSH: CPT | Mod: XU

## 2025-09-05 PROCEDURE — 74177 CT ABD & PELVIS W/CONTRAST: CPT

## 2025-09-05 RX ORDER — ACETAMINOPHEN 500 MG/5ML
1000 LIQUID (ML) ORAL ONCE
Refills: 0 | Status: COMPLETED | OUTPATIENT
Start: 2025-09-05 | End: 2025-09-05

## 2025-09-05 RX ADMIN — Medication 400 MILLIGRAM(S): at 20:35

## 2025-09-05 RX ADMIN — Medication 1000 MILLILITER(S): at 20:35

## 2025-09-06 VITALS
SYSTOLIC BLOOD PRESSURE: 131 MMHG | DIASTOLIC BLOOD PRESSURE: 72 MMHG | HEART RATE: 56 BPM | RESPIRATION RATE: 18 BRPM | TEMPERATURE: 98 F | OXYGEN SATURATION: 97 %

## 2025-09-12 ENCOUNTER — RESULT CHARGE (OUTPATIENT)
Age: 55
End: 2025-09-12

## 2025-09-12 ENCOUNTER — APPOINTMENT (OUTPATIENT)
Dept: CARDIOLOGY | Facility: CLINIC | Age: 55
End: 2025-09-12
Payer: MEDICARE

## 2025-09-12 ENCOUNTER — APPOINTMENT (OUTPATIENT)
Dept: UROGYNECOLOGY | Facility: CLINIC | Age: 55
End: 2025-09-12

## 2025-09-12 VITALS
SYSTOLIC BLOOD PRESSURE: 110 MMHG | DIASTOLIC BLOOD PRESSURE: 72 MMHG | HEIGHT: 62 IN | WEIGHT: 198 LBS | BODY MASS INDEX: 36.44 KG/M2

## 2025-09-12 VITALS
BODY MASS INDEX: 36.25 KG/M2 | HEART RATE: 59 BPM | DIASTOLIC BLOOD PRESSURE: 64 MMHG | OXYGEN SATURATION: 98 % | WEIGHT: 197 LBS | HEIGHT: 62 IN | SYSTOLIC BLOOD PRESSURE: 118 MMHG

## 2025-09-12 DIAGNOSIS — R35.0 FREQUENCY OF MICTURITION: ICD-10-CM

## 2025-09-12 DIAGNOSIS — N39.3 STRESS INCONTINENCE (FEMALE) (MALE): ICD-10-CM

## 2025-09-12 DIAGNOSIS — N32.81 OVERACTIVE BLADDER: ICD-10-CM

## 2025-09-12 DIAGNOSIS — I10 ESSENTIAL (PRIMARY) HYPERTENSION: ICD-10-CM

## 2025-09-12 DIAGNOSIS — R35.1 NOCTURIA: ICD-10-CM

## 2025-09-12 DIAGNOSIS — E78.5 HYPERLIPIDEMIA, UNSPECIFIED: ICD-10-CM

## 2025-09-12 DIAGNOSIS — F32.9 MAJOR DEPRESSIVE DISORDER, SINGLE EPISODE, UNSPECIFIED: ICD-10-CM

## 2025-09-12 DIAGNOSIS — E11.9 TYPE 2 DIABETES MELLITUS W/OUT COMPLICATIONS: ICD-10-CM

## 2025-09-12 DIAGNOSIS — E03.9 HYPOTHYROIDISM, UNSPECIFIED: ICD-10-CM

## 2025-09-12 DIAGNOSIS — K42.9 UMBILICAL HERNIA W/OUT OBSTRUCTION OR GANGRENE: ICD-10-CM

## 2025-09-12 DIAGNOSIS — G47.33 OBSTRUCTIVE SLEEP APNEA (ADULT) (PEDIATRIC): ICD-10-CM

## 2025-09-12 DIAGNOSIS — E66.9 OBESITY, UNSPECIFIED: ICD-10-CM

## 2025-09-12 PROCEDURE — G2211 COMPLEX E/M VISIT ADD ON: CPT

## 2025-09-12 PROCEDURE — 99214 OFFICE O/P EST MOD 30 MIN: CPT

## 2025-09-15 LAB
APPEARANCE: CLEAR
BACTERIA UR CULT: NORMAL
BACTERIA: NEGATIVE /HPF
BILIRUBIN URINE: NEGATIVE
BLOOD URINE: NEGATIVE
CALCIUM OXALATE CRYSTALS: PRESENT
CAST: 0 /LPF
COLOR: YELLOW
EPITHELIAL CELLS: 1 /HPF
GLUCOSE QUALITATIVE U: NEGATIVE MG/DL
KETONES URINE: NEGATIVE MG/DL
LEUKOCYTE ESTERASE URINE: NEGATIVE
MICROSCOPIC-UA: NORMAL
NITRITE URINE: NEGATIVE
PH URINE: 7
PROTEIN URINE: NEGATIVE MG/DL
RED BLOOD CELLS URINE: 2 /HPF
REVIEW: NORMAL
SPECIFIC GRAVITY URINE: 1.02
UROBILINOGEN URINE: 0.2 MG/DL
WHITE BLOOD CELLS URINE: 0 /HPF

## (undated) DEVICE — XI ARM DISSECTOR CURVED BIPOLAR 8MM

## (undated) DEVICE — VENODYNE/SCD SLEEVE CALF MEDIUM

## (undated) DEVICE — XI DRAPE COLUMN

## (undated) DEVICE — TUBING AIRSEAL TRI-LUMEN FILTERED

## (undated) DEVICE — ELCTR GROUNDING PAD ADULT COVIDIEN

## (undated) DEVICE — D HELP - CLEARVIEW CLEARIFY SYSTEM

## (undated) DEVICE — VESSEL LOOP EXTRA MAXI-BLUE 0.200" X 22"

## (undated) DEVICE — BITE BLOCK ADULT 20 X 27MM (GREEN)

## (undated) DEVICE — WARMING BLANKET FULL ADULT

## (undated) DEVICE — SUT VICRYL 2-0 27" CT-2 UNDYED

## (undated) DEVICE — TUBING CONNECTING 6MM 20FT

## (undated) DEVICE — XI CORD MONOPOLAR CAUTERY (GREEN)

## (undated) DEVICE — TROCAR SURGIQUEST AIRSEAL 12MMX100MM

## (undated) DEVICE — STAPLER SKIN PROXIMATE

## (undated) DEVICE — MASK PROCED EARLOOP 50/BX LRC COVID ADD

## (undated) DEVICE — FORCEP RADIAL JAW 4 W NDL 2.4MM 2.8MM 240CM ORANGE DISP

## (undated) DEVICE — ELCTR LAPAROSCOPIC MONOPOLAR CORD

## (undated) DEVICE — VALVE ENDOSCOPE DEFENDO SINGLE USE

## (undated) DEVICE — GUN DIL ALLIANCE

## (undated) DEVICE — POSITIONER FOAM EGG CRATE ULNAR 2PCS (PINK)

## (undated) DEVICE — SUT VICRYL 0 27" CT-2 UNDYED

## (undated) DEVICE — DRSG DERMABOND 0.7ML

## (undated) DEVICE — SENSOR O2 FINGER ADULT

## (undated) DEVICE — DRAIN PENROSE .25" X 18" SILICONE

## (undated) DEVICE — SSH-ERBE 26004501: Type: DURABLE MEDICAL EQUIPMENT

## (undated) DEVICE — XI ARM GRASPER TIP UP FENESTRATED

## (undated) DEVICE — SYR IV FLUSH SALINE 10ML 30/TY

## (undated) DEVICE — SUT VICRYL 0 27" UR-6

## (undated) DEVICE — SYR SLIP 10CC

## (undated) DEVICE — TUBING SUCTION CONN 6FT STERILE

## (undated) DEVICE — PACK ROBOTIC

## (undated) DEVICE — TUBING INSUFFLATION LAP FILTER 10FT

## (undated) DEVICE — SYR ALLIANCE II INFLATION 60ML

## (undated) DEVICE — TUBING HYBRID CO2

## (undated) DEVICE — FOLEY HOLDER STATLOCK 2 WAY ADULT

## (undated) DEVICE — GLV 7.5 PROTEXIS (WHITE)

## (undated) DEVICE — CLEANER FOR ELCTR TIP

## (undated) DEVICE — PREP CHLORAPREP HI-LITE ORANGE 26ML

## (undated) DEVICE — SUT MONOCRYL 4-0 27" PS-2 UNDYED

## (undated) DEVICE — XI CORD BIPOLAR CAUTERY (BLUE)

## (undated) DEVICE — XI VESSEL SEALER

## (undated) DEVICE — DRAIN PENROSE .25" X 12" SILICONE

## (undated) DEVICE — Device

## (undated) DEVICE — DRSG STERISTRIPS 0.5 X 4"

## (undated) DEVICE — BALLOON US ENDO

## (undated) DEVICE — TROCAR COVIDIEN VERSAPORT BLADELESS OPTICAL 5MM STANDARD

## (undated) DEVICE — XI ENDOWRIST SUCTION IRRIGATOR 8MM

## (undated) DEVICE — PACK IV START WITH CHG

## (undated) DEVICE — SOL IRR POUR H2O 1000ML

## (undated) DEVICE — SUT VICRYL 3-0 27" SH UNDYED

## (undated) DEVICE — DRSG 2X2

## (undated) DEVICE — SUT SILK 2-0 30" SH

## (undated) DEVICE — SUCTION YANKAUER TAPERED BULBOUS NO VENT

## (undated) DEVICE — SUT ETHIBOND 0 30" SH

## (undated) DEVICE — TUBING IV SET GRAVITY 3Y 100" MACRO

## (undated) DEVICE — DRAPE TOWEL BLUE STICKY

## (undated) DEVICE — CATH IV SAFE BC 22G X 1" (BLUE)

## (undated) DEVICE — DRAPE 1/2 SHEET 40X57"

## (undated) DEVICE — ELCTR BOVIE PENCIL BLADE 10FT

## (undated) DEVICE — XI SEAL UNIVERSIAL 5-12MM

## (undated) DEVICE — ELCTR BOVIE TIP BLADE INSULATED 6.5" EDGE

## (undated) DEVICE — UNDERPAD LINEN SAVER 23 X 36"

## (undated) DEVICE — PACK GENERAL LAPAROSCOPY

## (undated) DEVICE — TROCAR COVIDIEN VERSAONE FIXATION CANNULA 5MM

## (undated) DEVICE — XI DRAPE ARM

## (undated) DEVICE — TAPE SILK 3"

## (undated) DEVICE — SUT VICRYL 2-0 27" SH UNDYED

## (undated) DEVICE — GLV 8 PROTEXIS (WHITE)

## (undated) DEVICE — DRAPE GENERAL ENDOSCOPY

## (undated) DEVICE — ELCTR BOVIE TIP BLADE INSULATED 4" EDGE

## (undated) DEVICE — XI SEAL UNIV 5- 8 MM

## (undated) DEVICE — DRSG 4 X 8

## (undated) DEVICE — DISSECTOR ENDOSCOPIC KITTNER SINGLE TIP

## (undated) DEVICE — TUBING SUCTION 20FT

## (undated) DEVICE — GOWN IMPERV XL

## (undated) DEVICE — XI OBTURATOR OPTICAL BLADELESS 8MM

## (undated) DEVICE — TUBING ALARIS PUMP MODULE NON-DEHP

## (undated) DEVICE — WARMING BLANKET LOWER ADULT

## (undated) DEVICE — GOWN XL

## (undated) DEVICE — TROCAR COVIDIEN VERSAPORT BLADELESS OPTICAL 12MM STANDARD

## (undated) DEVICE — DRSG GAUZE PACKTNER ROLL

## (undated) DEVICE — SOL IRR BAG NS 0.9% 1000ML

## (undated) DEVICE — CATH IV SAFE BC 20G X 1.16" (PINK)

## (undated) DEVICE — SOL IRR BAG H2O 1000ML

## (undated) DEVICE — DRAPE MAJOR ABDOMINAL W POUCHES

## (undated) DEVICE — SUT VLOC PBT 0 9" GS-22 BLUE

## (undated) DEVICE — SUT VLOC 180 0 9" GS-21 GREEN

## (undated) DEVICE — XI ARM FORCEP CADIERE 8MM

## (undated) DEVICE — VESSEL LOOP MAXI-YELLOW  0.120" X 16"

## (undated) DEVICE — SUCTION YANKAUER NO CONTROL VENT

## (undated) DEVICE — BLADE SURGICAL #10 CARBON

## (undated) DEVICE — DRAPE TOWEL BLUE 17" X 24"

## (undated) DEVICE — POSITIONER FOAM HEADREST (PINK)

## (undated) DEVICE — DVC INFLATION CRE STERIFLATE

## (undated) DEVICE — TUBING STRYKEFLOW II SUCTION / IRRIGATOR

## (undated) DEVICE — DENTURE CUP PINK

## (undated) DEVICE — DRSG CURITY GAUZE SPONGE 4 X 4" 12-PLY NON-STERILE

## (undated) DEVICE — ELCTR ROCKER SWITCH PENCIL BLUE 10FT

## (undated) DEVICE — SOL IRR POUR NS 0.9% 1000ML

## (undated) DEVICE — SUT VICRYL 2-0 27" UR-6

## (undated) DEVICE — TUBING IV EXTENSION MACRO W CLAVE 7"

## (undated) DEVICE — SUT PROLENE 1 30" CTX

## (undated) DEVICE — WARMING BLANKET UPPER ADULT

## (undated) DEVICE — SYR LUER SLIP TIP 50CC

## (undated) DEVICE — INSUFFLATION NDL COVIDIEN SURGINEEDLE VERESS 120MM

## (undated) DEVICE — SOL BAG NS 0.9% 1000ML

## (undated) DEVICE — SUT PDS II 1 36" CTX

## (undated) DEVICE — XI ARM FORCEP FENESTRATED BIPOLAR 8MM

## (undated) DEVICE — TUBING ERBE CO2 OLYMPUS CONNECTOR